# Patient Record
Sex: MALE | Race: OTHER | NOT HISPANIC OR LATINO | Employment: FULL TIME | URBAN - METROPOLITAN AREA
[De-identification: names, ages, dates, MRNs, and addresses within clinical notes are randomized per-mention and may not be internally consistent; named-entity substitution may affect disease eponyms.]

---

## 2018-07-20 ENCOUNTER — APPOINTMENT (OUTPATIENT)
Dept: RADIOLOGY | Facility: CLINIC | Age: 43
End: 2018-07-20
Payer: COMMERCIAL

## 2018-07-20 ENCOUNTER — TRANSCRIBE ORDERS (OUTPATIENT)
Dept: ADMINISTRATIVE | Facility: HOSPITAL | Age: 43
End: 2018-07-20

## 2018-07-20 DIAGNOSIS — M54.16 LUMBAR RADICULOPATHY: Primary | ICD-10-CM

## 2018-07-20 DIAGNOSIS — M54.16 LUMBAR RADICULOPATHY: ICD-10-CM

## 2018-07-20 PROCEDURE — 72110 X-RAY EXAM L-2 SPINE 4/>VWS: CPT

## 2020-06-16 ENCOUNTER — HOSPITAL ENCOUNTER (EMERGENCY)
Facility: HOSPITAL | Age: 45
Discharge: HOME | End: 2020-06-17
Attending: EMERGENCY MEDICINE | Admitting: EMERGENCY MEDICINE
Payer: COMMERCIAL

## 2020-06-16 DIAGNOSIS — R07.9 CHEST PAIN WITH LOW RISK FOR CARDIAC ETIOLOGY: Primary | ICD-10-CM

## 2020-06-16 PROCEDURE — 93005 ELECTROCARDIOGRAM TRACING: CPT

## 2020-06-16 PROCEDURE — 99285 EMERGENCY DEPT VISIT HI MDM: CPT

## 2020-06-17 ENCOUNTER — APPOINTMENT (EMERGENCY)
Dept: RADIOLOGY | Facility: HOSPITAL | Age: 45
End: 2020-06-17
Payer: COMMERCIAL

## 2020-06-17 VITALS
HEART RATE: 60 BPM | RESPIRATION RATE: 16 BRPM | OXYGEN SATURATION: 99 % | DIASTOLIC BLOOD PRESSURE: 94 MMHG | SYSTOLIC BLOOD PRESSURE: 149 MMHG

## 2020-06-17 LAB
ALBUMIN SERPL BCP-MCNC: 3.5 G/DL (ref 3.5–5)
ALP SERPL-CCNC: 128 U/L (ref 46–116)
ALT SERPL W P-5'-P-CCNC: 36 U/L (ref 12–78)
ANION GAP SERPL CALCULATED.3IONS-SCNC: 8 MMOL/L (ref 4–13)
AST SERPL W P-5'-P-CCNC: 18 U/L (ref 5–45)
ATRIAL RATE: 58 BPM
ATRIAL RATE: 71 BPM
BASOPHILS # BLD AUTO: 0.07 THOUSANDS/ΜL (ref 0–0.1)
BASOPHILS NFR BLD AUTO: 1 % (ref 0–1)
BILIRUB SERPL-MCNC: 0.2 MG/DL (ref 0.2–1)
BUN SERPL-MCNC: 14 MG/DL (ref 5–25)
CALCIUM SERPL-MCNC: 8.7 MG/DL (ref 8.3–10.1)
CHLORIDE SERPL-SCNC: 102 MMOL/L (ref 100–108)
CO2 SERPL-SCNC: 28 MMOL/L (ref 21–32)
CREAT SERPL-MCNC: 1.06 MG/DL (ref 0.6–1.3)
EOSINOPHIL # BLD AUTO: 0.32 THOUSAND/ΜL (ref 0–0.61)
EOSINOPHIL NFR BLD AUTO: 3 % (ref 0–6)
ERYTHROCYTE [DISTWIDTH] IN BLOOD BY AUTOMATED COUNT: 14 % (ref 11.6–15.1)
GFR SERPL CREATININE-BSD FRML MDRD: 84 ML/MIN/1.73SQ M
GLUCOSE SERPL-MCNC: 111 MG/DL (ref 65–140)
HCT VFR BLD AUTO: 41.4 % (ref 36.5–49.3)
HGB BLD-MCNC: 14.4 G/DL (ref 12–17)
IMM GRANULOCYTES # BLD AUTO: 0.02 THOUSAND/UL (ref 0–0.2)
IMM GRANULOCYTES NFR BLD AUTO: 0 % (ref 0–2)
LYMPHOCYTES # BLD AUTO: 3.51 THOUSANDS/ΜL (ref 0.6–4.47)
LYMPHOCYTES NFR BLD AUTO: 36 % (ref 14–44)
MCH RBC QN AUTO: 32.8 PG (ref 26.8–34.3)
MCHC RBC AUTO-ENTMCNC: 34.8 G/DL (ref 31.4–37.4)
MCV RBC AUTO: 94 FL (ref 82–98)
MONOCYTES # BLD AUTO: 0.91 THOUSAND/ΜL (ref 0.17–1.22)
MONOCYTES NFR BLD AUTO: 9 % (ref 4–12)
NEUTROPHILS # BLD AUTO: 4.86 THOUSANDS/ΜL (ref 1.85–7.62)
NEUTS SEG NFR BLD AUTO: 51 % (ref 43–75)
NRBC BLD AUTO-RTO: 0 /100 WBCS
P AXIS: 38 DEGREES
P AXIS: 44 DEGREES
PLATELET # BLD AUTO: 265 THOUSANDS/UL (ref 149–390)
PMV BLD AUTO: 10.3 FL (ref 8.9–12.7)
POTASSIUM SERPL-SCNC: 3.6 MMOL/L (ref 3.5–5.3)
PR INTERVAL: 150 MS
PR INTERVAL: 156 MS
PROT SERPL-MCNC: 6.6 G/DL (ref 6.4–8.2)
QRS AXIS: 61 DEGREES
QRS AXIS: 72 DEGREES
QRSD INTERVAL: 94 MS
QRSD INTERVAL: 96 MS
QT INTERVAL: 370 MS
QT INTERVAL: 420 MS
QTC INTERVAL: 402 MS
QTC INTERVAL: 412 MS
RBC # BLD AUTO: 4.39 MILLION/UL (ref 3.88–5.62)
SODIUM SERPL-SCNC: 138 MMOL/L (ref 136–145)
T WAVE AXIS: 10 DEGREES
T WAVE AXIS: 10 DEGREES
TROPONIN I SERPL-MCNC: <0.02 NG/ML
TROPONIN I SERPL-MCNC: <0.02 NG/ML
VENTRICULAR RATE: 58 BPM
VENTRICULAR RATE: 71 BPM
WBC # BLD AUTO: 9.69 THOUSAND/UL (ref 4.31–10.16)

## 2020-06-17 PROCEDURE — 85025 COMPLETE CBC W/AUTO DIFF WBC: CPT | Performed by: EMERGENCY MEDICINE

## 2020-06-17 PROCEDURE — 99285 EMERGENCY DEPT VISIT HI MDM: CPT | Performed by: EMERGENCY MEDICINE

## 2020-06-17 PROCEDURE — 36415 COLL VENOUS BLD VENIPUNCTURE: CPT | Performed by: EMERGENCY MEDICINE

## 2020-06-17 PROCEDURE — 80053 COMPREHEN METABOLIC PANEL: CPT | Performed by: EMERGENCY MEDICINE

## 2020-06-17 PROCEDURE — 93010 ELECTROCARDIOGRAM REPORT: CPT | Performed by: INTERNAL MEDICINE

## 2020-06-17 PROCEDURE — 93005 ELECTROCARDIOGRAM TRACING: CPT

## 2020-06-17 PROCEDURE — 84484 ASSAY OF TROPONIN QUANT: CPT | Performed by: EMERGENCY MEDICINE

## 2020-06-17 PROCEDURE — 96374 THER/PROPH/DIAG INJ IV PUSH: CPT

## 2020-06-17 PROCEDURE — 71046 X-RAY EXAM CHEST 2 VIEWS: CPT

## 2020-06-17 RX ORDER — KETOROLAC TROMETHAMINE 30 MG/ML
30 INJECTION, SOLUTION INTRAMUSCULAR; INTRAVENOUS ONCE
Status: COMPLETED | OUTPATIENT
Start: 2020-06-17 | End: 2020-06-17

## 2020-06-17 RX ORDER — MAGNESIUM HYDROXIDE/ALUMINUM HYDROXICE/SIMETHICONE 120; 1200; 1200 MG/30ML; MG/30ML; MG/30ML
30 SUSPENSION ORAL ONCE
Status: COMPLETED | OUTPATIENT
Start: 2020-06-17 | End: 2020-06-17

## 2020-06-17 RX ORDER — ASPIRIN 81 MG/1
243 TABLET, CHEWABLE ORAL ONCE
Status: COMPLETED | OUTPATIENT
Start: 2020-06-17 | End: 2020-06-17

## 2020-06-17 RX ADMIN — ASPIRIN 81 MG 243 MG: 81 TABLET ORAL at 00:30

## 2020-06-17 RX ADMIN — KETOROLAC TROMETHAMINE 30 MG: 30 INJECTION, SOLUTION INTRAMUSCULAR at 00:32

## 2020-06-17 RX ADMIN — ALUMINUM HYDROXIDE, MAGNESIUM HYDROXIDE, AND SIMETHICONE 30 ML: 200; 200; 20 SUSPENSION ORAL at 02:21

## 2020-12-01 ENCOUNTER — APPOINTMENT (EMERGENCY)
Dept: CT IMAGING | Facility: HOSPITAL | Age: 45
End: 2020-12-01
Payer: COMMERCIAL

## 2020-12-01 ENCOUNTER — HOSPITAL ENCOUNTER (EMERGENCY)
Facility: HOSPITAL | Age: 45
Discharge: HOME | End: 2020-12-02
Attending: EMERGENCY MEDICINE | Admitting: EMERGENCY MEDICINE
Payer: COMMERCIAL

## 2020-12-01 VITALS
HEART RATE: 72 BPM | BODY MASS INDEX: 30.06 KG/M2 | OXYGEN SATURATION: 98 % | WEIGHT: 210 LBS | HEIGHT: 70 IN | TEMPERATURE: 96.8 F | SYSTOLIC BLOOD PRESSURE: 189 MMHG | RESPIRATION RATE: 18 BRPM | DIASTOLIC BLOOD PRESSURE: 108 MMHG

## 2020-12-01 DIAGNOSIS — N20.1 URETEROLITHIASIS: Primary | ICD-10-CM

## 2020-12-01 LAB
ALBUMIN SERPL BCP-MCNC: 4 G/DL (ref 3.5–5.7)
ALP SERPL-CCNC: 95 U/L (ref 40–150)
ALT SERPL W P-5'-P-CCNC: 20 U/L (ref 7–52)
ANION GAP SERPL CALCULATED.3IONS-SCNC: 9 MMOL/L (ref 4–13)
APTT PPP: 24 SECONDS (ref 23–37)
AST SERPL W P-5'-P-CCNC: 14 U/L (ref 13–39)
BACTERIA UR QL AUTO: ABNORMAL /HPF
BASOPHILS # BLD AUTO: 0.1 THOUSANDS/ΜL (ref 0–0.1)
BASOPHILS NFR BLD AUTO: 1 % (ref 0–2)
BILIRUB SERPL-MCNC: 0.3 MG/DL (ref 0.2–1)
BILIRUB UR QL STRIP: NEGATIVE
BUN SERPL-MCNC: 18 MG/DL (ref 7–25)
CALCIUM SERPL-MCNC: 8.6 MG/DL (ref 8.6–10.5)
CHLORIDE SERPL-SCNC: 104 MMOL/L (ref 98–107)
CLARITY UR: CLEAR
CO2 SERPL-SCNC: 25 MMOL/L (ref 21–31)
COLOR UR: YELLOW
CREAT SERPL-MCNC: 1.34 MG/DL (ref 0.7–1.3)
EOSINOPHIL # BLD AUTO: 0.2 THOUSAND/ΜL (ref 0–0.61)
EOSINOPHIL NFR BLD AUTO: 2 % (ref 0–5)
ERYTHROCYTE [DISTWIDTH] IN BLOOD BY AUTOMATED COUNT: 14.2 % (ref 11.5–14.5)
GFR SERPL CREATININE-BSD FRML MDRD: 63 ML/MIN/1.73SQ M
GLUCOSE SERPL-MCNC: 131 MG/DL (ref 65–99)
GLUCOSE UR STRIP-MCNC: NEGATIVE MG/DL
HCT VFR BLD AUTO: 42.9 % (ref 42–47)
HGB BLD-MCNC: 14.8 G/DL (ref 14–18)
HGB UR QL STRIP.AUTO: ABNORMAL
INR PPP: 1.08 (ref 0.84–1.19)
KETONES UR STRIP-MCNC: ABNORMAL MG/DL
LEUKOCYTE ESTERASE UR QL STRIP: NEGATIVE
LIPASE SERPL-CCNC: 10 U/L (ref 11–82)
LYMPHOCYTES # BLD AUTO: 3.9 THOUSANDS/ΜL (ref 0.6–4.47)
LYMPHOCYTES NFR BLD AUTO: 28 % (ref 21–51)
MCH RBC QN AUTO: 32.5 PG (ref 26–34)
MCHC RBC AUTO-ENTMCNC: 34.5 G/DL (ref 31–37)
MCV RBC AUTO: 94 FL (ref 81–99)
MONOCYTES # BLD AUTO: 1 THOUSAND/ΜL (ref 0.17–1.22)
MONOCYTES NFR BLD AUTO: 7 % (ref 2–12)
NEUTROPHILS # BLD AUTO: 8.5 THOUSANDS/ΜL (ref 1.4–6.5)
NEUTS SEG NFR BLD AUTO: 62 % (ref 42–75)
NITRITE UR QL STRIP: NEGATIVE
NON-SQ EPI CELLS URNS QL MICRO: ABNORMAL /HPF
PH UR STRIP.AUTO: 7 [PH]
PLATELET # BLD AUTO: 273 THOUSANDS/UL (ref 149–390)
PMV BLD AUTO: 8.4 FL (ref 8.6–11.7)
POTASSIUM SERPL-SCNC: 3.6 MMOL/L (ref 3.5–5.5)
PROT SERPL-MCNC: 6.8 G/DL (ref 6.4–8.9)
PROT UR STRIP-MCNC: NEGATIVE MG/DL
PROTHROMBIN TIME: 13.9 SECONDS (ref 11.6–14.5)
RBC # BLD AUTO: 4.56 MILLION/UL (ref 4.3–5.9)
RBC #/AREA URNS AUTO: ABNORMAL /HPF
SODIUM SERPL-SCNC: 138 MMOL/L (ref 134–143)
SP GR UR STRIP.AUTO: 1.02 (ref 1–1.03)
UROBILINOGEN UR QL STRIP.AUTO: 0.2 E.U./DL
WBC # BLD AUTO: 13.7 THOUSAND/UL (ref 4.8–10.8)
WBC #/AREA URNS AUTO: ABNORMAL /HPF

## 2020-12-01 PROCEDURE — 96375 TX/PRO/DX INJ NEW DRUG ADDON: CPT

## 2020-12-01 PROCEDURE — 85610 PROTHROMBIN TIME: CPT | Performed by: EMERGENCY MEDICINE

## 2020-12-01 PROCEDURE — 81003 URINALYSIS AUTO W/O SCOPE: CPT | Performed by: EMERGENCY MEDICINE

## 2020-12-01 PROCEDURE — 93005 ELECTROCARDIOGRAM TRACING: CPT

## 2020-12-01 PROCEDURE — 81001 URINALYSIS AUTO W/SCOPE: CPT | Performed by: EMERGENCY MEDICINE

## 2020-12-01 PROCEDURE — 99285 EMERGENCY DEPT VISIT HI MDM: CPT

## 2020-12-01 PROCEDURE — 80053 COMPREHEN METABOLIC PANEL: CPT | Performed by: EMERGENCY MEDICINE

## 2020-12-01 PROCEDURE — 85730 THROMBOPLASTIN TIME PARTIAL: CPT | Performed by: EMERGENCY MEDICINE

## 2020-12-01 PROCEDURE — 96374 THER/PROPH/DIAG INJ IV PUSH: CPT

## 2020-12-01 PROCEDURE — 74177 CT ABD & PELVIS W/CONTRAST: CPT

## 2020-12-01 PROCEDURE — 83690 ASSAY OF LIPASE: CPT | Performed by: EMERGENCY MEDICINE

## 2020-12-01 PROCEDURE — 85025 COMPLETE CBC W/AUTO DIFF WBC: CPT | Performed by: EMERGENCY MEDICINE

## 2020-12-01 PROCEDURE — G1004 CDSM NDSC: HCPCS

## 2020-12-01 PROCEDURE — 36415 COLL VENOUS BLD VENIPUNCTURE: CPT | Performed by: EMERGENCY MEDICINE

## 2020-12-01 PROCEDURE — 99285 EMERGENCY DEPT VISIT HI MDM: CPT | Performed by: EMERGENCY MEDICINE

## 2020-12-01 RX ORDER — ONDANSETRON 2 MG/ML
4 INJECTION INTRAMUSCULAR; INTRAVENOUS ONCE AS NEEDED
Status: COMPLETED | OUTPATIENT
Start: 2020-12-01 | End: 2020-12-01

## 2020-12-01 RX ORDER — FENTANYL CITRATE 50 UG/ML
50 INJECTION, SOLUTION INTRAMUSCULAR; INTRAVENOUS ONCE
Status: COMPLETED | OUTPATIENT
Start: 2020-12-01 | End: 2020-12-01

## 2020-12-01 RX ORDER — HYDROMORPHONE HCL/PF 1 MG/ML
0.5 SYRINGE (ML) INJECTION ONCE
Status: DISCONTINUED | OUTPATIENT
Start: 2020-12-01 | End: 2020-12-01

## 2020-12-01 RX ORDER — ONDANSETRON 2 MG/ML
1 INJECTION INTRAMUSCULAR; INTRAVENOUS ONCE
Status: COMPLETED | OUTPATIENT
Start: 2020-12-01 | End: 2020-12-01

## 2020-12-01 RX ORDER — ACETAMINOPHEN 325 MG/1
650 TABLET ORAL ONCE
Status: COMPLETED | OUTPATIENT
Start: 2020-12-01 | End: 2020-12-02

## 2020-12-01 RX ORDER — FENTANYL CITRATE 50 UG/ML
1 INJECTION, SOLUTION INTRAMUSCULAR; INTRAVENOUS ONCE
Status: COMPLETED | OUTPATIENT
Start: 2020-12-01 | End: 2020-12-01

## 2020-12-01 RX ORDER — KETOROLAC TROMETHAMINE 30 MG/ML
15 INJECTION, SOLUTION INTRAMUSCULAR; INTRAVENOUS ONCE
Status: COMPLETED | OUTPATIENT
Start: 2020-12-01 | End: 2020-12-01

## 2020-12-01 RX ADMIN — FENTANYL CITRATE 50 MCG: 50 INJECTION INTRAMUSCULAR; INTRAVENOUS at 21:26

## 2020-12-01 RX ADMIN — KETOROLAC TROMETHAMINE 15 MG: 30 INJECTION, SOLUTION INTRAMUSCULAR; INTRAVENOUS at 23:13

## 2020-12-01 RX ADMIN — ONDANSETRON 4 MG: 2 INJECTION INTRAMUSCULAR; INTRAVENOUS at 21:43

## 2020-12-01 RX ADMIN — IOHEXOL 100 ML: 350 INJECTION, SOLUTION INTRAVENOUS at 22:30

## 2020-12-02 LAB
ATRIAL RATE: 73 BPM
P AXIS: 56 DEGREES
PR INTERVAL: 146 MS
QRS AXIS: 61 DEGREES
QRSD INTERVAL: 90 MS
QT INTERVAL: 406 MS
QTC INTERVAL: 447 MS
T WAVE AXIS: 55 DEGREES
VENTRICULAR RATE: 73 BPM

## 2020-12-02 PROCEDURE — 93010 ELECTROCARDIOGRAM REPORT: CPT | Performed by: INTERNAL MEDICINE

## 2020-12-02 RX ORDER — MORPHINE SULFATE 15 MG/1
15 TABLET ORAL EVERY 8 HOURS PRN
Qty: 12 TABLET | Refills: 0 | Status: SHIPPED | OUTPATIENT
Start: 2020-12-02 | End: 2020-12-12

## 2020-12-02 RX ORDER — TAMSULOSIN HYDROCHLORIDE 0.4 MG/1
0.4 CAPSULE ORAL
Qty: 10 CAPSULE | Refills: 0 | Status: SHIPPED | OUTPATIENT
Start: 2020-12-02 | End: 2022-02-15 | Stop reason: HOSPADM

## 2020-12-02 RX ORDER — IBUPROFEN 400 MG/1
400 TABLET ORAL EVERY 6 HOURS PRN
Qty: 30 TABLET | Refills: 0 | Status: SHIPPED | OUTPATIENT
Start: 2020-12-02 | End: 2022-02-15 | Stop reason: HOSPADM

## 2020-12-02 RX ORDER — ACETAMINOPHEN 325 MG/1
650 TABLET ORAL EVERY 6 HOURS PRN
Qty: 30 TABLET | Refills: 0 | Status: SHIPPED | OUTPATIENT
Start: 2020-12-02 | End: 2020-12-07

## 2020-12-02 RX ORDER — ONDANSETRON 4 MG/1
4 TABLET, ORALLY DISINTEGRATING ORAL EVERY 6 HOURS PRN
Qty: 16 TABLET | Refills: 0 | Status: SHIPPED | OUTPATIENT
Start: 2020-12-02 | End: 2022-02-15 | Stop reason: HOSPADM

## 2020-12-02 RX ADMIN — ACETAMINOPHEN 650 MG: 325 TABLET ORAL at 00:26

## 2021-03-17 ENCOUNTER — HOSPITAL ENCOUNTER (EMERGENCY)
Facility: HOSPITAL | Age: 46
Discharge: HOME | End: 2021-03-17
Attending: EMERGENCY MEDICINE
Payer: COMMERCIAL

## 2021-03-17 ENCOUNTER — APPOINTMENT (EMERGENCY)
Dept: RADIOLOGY | Facility: HOSPITAL | Age: 46
End: 2021-03-17
Payer: COMMERCIAL

## 2021-03-17 VITALS
OXYGEN SATURATION: 98 % | RESPIRATION RATE: 18 BRPM | TEMPERATURE: 98.4 F | DIASTOLIC BLOOD PRESSURE: 92 MMHG | SYSTOLIC BLOOD PRESSURE: 154 MMHG | BODY MASS INDEX: 30.49 KG/M2 | HEART RATE: 91 BPM | WEIGHT: 212.52 LBS

## 2021-03-17 DIAGNOSIS — R06.00 DYSPNEA: Primary | ICD-10-CM

## 2021-03-17 DIAGNOSIS — U07.1 COVID-19: ICD-10-CM

## 2021-03-17 LAB
ALBUMIN SERPL BCP-MCNC: 3.6 G/DL (ref 3.5–5)
ALP SERPL-CCNC: 139 U/L (ref 46–116)
ALT SERPL W P-5'-P-CCNC: 52 U/L (ref 12–78)
ANION GAP SERPL CALCULATED.3IONS-SCNC: 10 MMOL/L (ref 4–13)
AST SERPL W P-5'-P-CCNC: 30 U/L (ref 5–45)
ATRIAL RATE: 77 BPM
BASE EX.OXY STD BLDV CALC-SCNC: 87.4 % (ref 60–80)
BASE EXCESS BLDV CALC-SCNC: -0.6 MMOL/L
BASOPHILS # BLD AUTO: 0.05 THOUSANDS/ΜL (ref 0–0.1)
BASOPHILS NFR BLD AUTO: 1 % (ref 0–1)
BILIRUB DIRECT SERPL-MCNC: 0.07 MG/DL (ref 0–0.2)
BILIRUB SERPL-MCNC: 0.15 MG/DL (ref 0.2–1)
BUN SERPL-MCNC: 12 MG/DL (ref 5–25)
CALCIUM SERPL-MCNC: 9 MG/DL (ref 8.3–10.1)
CHLORIDE SERPL-SCNC: 103 MMOL/L (ref 100–108)
CO2 SERPL-SCNC: 24 MMOL/L (ref 21–32)
CREAT SERPL-MCNC: 1.25 MG/DL (ref 0.6–1.3)
EOSINOPHIL # BLD AUTO: 0.06 THOUSAND/ΜL (ref 0–0.61)
EOSINOPHIL NFR BLD AUTO: 1 % (ref 0–6)
ERYTHROCYTE [DISTWIDTH] IN BLOOD BY AUTOMATED COUNT: 14.3 % (ref 11.6–15.1)
GFR SERPL CREATININE-BSD FRML MDRD: 68 ML/MIN/1.73SQ M
GLUCOSE SERPL-MCNC: 111 MG/DL (ref 65–140)
HCO3 BLDV-SCNC: 23 MMOL/L (ref 24–30)
HCT VFR BLD AUTO: 45.3 % (ref 36.5–49.3)
HGB BLD-MCNC: 15.7 G/DL (ref 12–17)
IMM GRANULOCYTES # BLD AUTO: 0.02 THOUSAND/UL (ref 0–0.2)
IMM GRANULOCYTES NFR BLD AUTO: 0 % (ref 0–2)
LYMPHOCYTES # BLD AUTO: 1.5 THOUSANDS/ΜL (ref 0.6–4.47)
LYMPHOCYTES NFR BLD AUTO: 21 % (ref 14–44)
MCH RBC QN AUTO: 32 PG (ref 26.8–34.3)
MCHC RBC AUTO-ENTMCNC: 34.7 G/DL (ref 31.4–37.4)
MCV RBC AUTO: 92 FL (ref 82–98)
MONOCYTES # BLD AUTO: 1.03 THOUSAND/ΜL (ref 0.17–1.22)
MONOCYTES NFR BLD AUTO: 15 % (ref 4–12)
NEUTROPHILS # BLD AUTO: 4.36 THOUSANDS/ΜL (ref 1.85–7.62)
NEUTS SEG NFR BLD AUTO: 62 % (ref 43–75)
NRBC BLD AUTO-RTO: 0 /100 WBCS
O2 CT BLDV-SCNC: 19.9 ML/DL
P AXIS: 49 DEGREES
PCO2 BLDV: 35.2 MM HG (ref 42–50)
PH BLDV: 7.43 [PH] (ref 7.3–7.4)
PLATELET # BLD AUTO: 242 THOUSANDS/UL (ref 149–390)
PMV BLD AUTO: 10.1 FL (ref 8.9–12.7)
PO2 BLDV: 58.8 MM HG (ref 35–45)
POTASSIUM SERPL-SCNC: 3.9 MMOL/L (ref 3.5–5.3)
PR INTERVAL: 134 MS
PROT SERPL-MCNC: 7.3 G/DL (ref 6.4–8.2)
QRS AXIS: 62 DEGREES
QRSD INTERVAL: 92 MS
QT INTERVAL: 346 MS
QTC INTERVAL: 391 MS
RBC # BLD AUTO: 4.9 MILLION/UL (ref 3.88–5.62)
SODIUM SERPL-SCNC: 137 MMOL/L (ref 136–145)
T WAVE AXIS: 22 DEGREES
VENTRICULAR RATE: 77 BPM
WBC # BLD AUTO: 7.02 THOUSAND/UL (ref 4.31–10.16)

## 2021-03-17 PROCEDURE — 80076 HEPATIC FUNCTION PANEL: CPT | Performed by: EMERGENCY MEDICINE

## 2021-03-17 PROCEDURE — 93005 ELECTROCARDIOGRAM TRACING: CPT

## 2021-03-17 PROCEDURE — 71045 X-RAY EXAM CHEST 1 VIEW: CPT

## 2021-03-17 PROCEDURE — 93010 ELECTROCARDIOGRAM REPORT: CPT

## 2021-03-17 PROCEDURE — 80048 BASIC METABOLIC PNL TOTAL CA: CPT | Performed by: EMERGENCY MEDICINE

## 2021-03-17 PROCEDURE — 36415 COLL VENOUS BLD VENIPUNCTURE: CPT | Performed by: EMERGENCY MEDICINE

## 2021-03-17 PROCEDURE — 99285 EMERGENCY DEPT VISIT HI MDM: CPT | Performed by: EMERGENCY MEDICINE

## 2021-03-17 PROCEDURE — 85025 COMPLETE CBC W/AUTO DIFF WBC: CPT | Performed by: EMERGENCY MEDICINE

## 2021-03-17 PROCEDURE — 82805 BLOOD GASES W/O2 SATURATION: CPT | Performed by: EMERGENCY MEDICINE

## 2021-03-17 PROCEDURE — 99285 EMERGENCY DEPT VISIT HI MDM: CPT

## 2021-03-17 RX ORDER — ALBUTEROL SULFATE 90 UG/1
2 AEROSOL, METERED RESPIRATORY (INHALATION) ONCE
Status: COMPLETED | OUTPATIENT
Start: 2021-03-17 | End: 2021-03-17

## 2021-03-17 RX ADMIN — ALBUTEROL SULFATE 2 PUFF: 90 AEROSOL, METERED RESPIRATORY (INHALATION) at 21:14

## 2021-03-18 NOTE — ED PROVIDER NOTES
History  Chief Complaint   Patient presents with    Shortness of Breath     Pt states "sob not sure if its from covid or sinus infection"     52 YO male, known COVID +, presents with 3 hours of SOB  Pt states he has had URI symptoms for the last 2 days, he was tested as an outpatient yesterday and this came back positive  He has had nasal congestion, aches  Tonight pt felt as if he was not able to get in enough oxygen  He denies chest pain  Pt is a smoker, he has COPD which he does not require treatment for  Pt denies CP/F/C/N/V/D/C, no dysuria, burning on urination or blood in urine  History provided by:  Patient   used: No    Shortness of Breath  Severity:  Moderate  Onset quality:  Gradual  Duration:  3 hours  Timing:  Constant  Progression:  Unchanged  Chronicity:  New  Context: URI    Relieved by:  Nothing  Worsened by:  Exertion  Ineffective treatments:  None tried  Associated symptoms: no abdominal pain, no chest pain, no fever, no neck pain, no rash and no vomiting        Prior to Admission Medications   Prescriptions Last Dose Informant Patient Reported? Taking?   ibuprofen (MOTRIN) 400 mg tablet   No No   Sig: Take 1 tablet (400 mg total) by mouth every 6 (six) hours as needed for mild pain for up to 5 days   ondansetron (ZOFRAN-ODT) 4 mg disintegrating tablet   No No   Sig: Take 1 tablet (4 mg total) by mouth every 6 (six) hours as needed for nausea or vomiting   tamsulosin (FLOMAX) 0 4 mg   No No   Sig: Take 1 capsule (0 4 mg total) by mouth daily with dinner      Facility-Administered Medications: None       Past Medical History:   Diagnosis Date    [facility]     [facility]     [facility] (my[facility] infarction) ([facility] ) 2007       History reviewed  No pertinent surgical history  History reviewed  No pertinent family history  I have reviewed and agree with the history as documented      E-Cigarette/Vaping E-Cigarette/Vaping Substances     Social History     Tobacco Use    Smoking status: Current Every Day Smoker     Packs/day: 0 50     Years: 24 00     Pack years: 12 00     Types: Cigarettes    Smokeless tobacco: Current User    Tobacco comment: "here and there a cigerette"   Substance Use Topics    Alcohol use: Not Currently    Drug use: Not Currently       Review of Systems   Constitutional: Negative for fever  HENT: Negative for dental problem  Eyes: Negative for visual disturbance  Respiratory: Positive for shortness of breath  Cardiovascular: Negative for chest pain  Gastrointestinal: Negative for abdominal pain, nausea and vomiting  Genitourinary: Negative for dysuria and frequency  Musculoskeletal: Negative for neck pain and neck stiffness  Skin: Negative for rash  Neurological: Negative for dizziness, weakness and light-headedness  Psychiatric/Behavioral: Negative for agitation, behavioral problems and confusion  All other systems reviewed and are negative  Physical Exam  Physical Exam  Vitals signs and nursing note reviewed  Constitutional:       Appearance: He is well-developed  HENT:      Head: Normocephalic and atraumatic  Eyes:      Extraocular Movements: Extraocular movements intact  Neck:      Musculoskeletal: Normal range of motion  Cardiovascular:      Rate and Rhythm: Normal rate and regular rhythm  Pulmonary:      Effort: Pulmonary effort is normal    Abdominal:      General: There is no distension  Musculoskeletal: Normal range of motion  Skin:     Findings: No rash  Neurological:      Mental Status: He is alert and oriented to person, place, and time     Psychiatric:         Behavior: Behavior normal          Vital Signs  ED Triage Vitals [03/17/21 2045]   Temperature Pulse Respirations Blood Pressure SpO2   98 4 °F (36 9 °C) 91 18 154/92 98 %      Temp src Heart Rate Source Patient Position - Orthostatic VS BP Location FiO2 (%)   -- -- -- -- --      Pain Score       6           Vitals:    03/17/21 2045   BP: 154/92   Pulse: 91         Visual Acuity      ED Medications  Medications   albuterol (PROVENTIL HFA,VENTOLIN HFA) inhaler 2 puff (2 puffs Inhalation Given 3/17/21 2114)       Diagnostic Studies  Results Reviewed     Procedure Component Value Units Date/Time    Basic metabolic panel [[id number]] Collected: 03/17/21 2114    Lab Status: Final result Specimen: Blood from Arm, Left Updated: 03/17/21 2147     Sodium 137 mmol/L      Potassium 3 9 mmol/L      Chloride 103 mmol/L      CO2 24 mmol/L      ANION GAP 10 mmol/L      BUN 12 mg/dL      Creatinine 1 25 mg/dL      Glucose 111 mg/dL      Calcium 9 0 mg/dL      eGFR 68 ml/min/1 73sq m     Narrative:      [place] guidelines for Chronic Kidney Disease (CKD):     Stage 1 with normal or high GFR (GFR > 90 mL/min/1 73 square meters)    Stage 2 Mild CKD (GFR = 60-89 mL/min/1 73 square meters)    Stage 3A Moderate CKD (GFR = 45-59 mL/min/1 73 square meters)    Stage 3B Moderate CKD (GFR = 30-44 mL/min/1 73 square meters)    Stage 4 Severe CKD (GFR = 15-29 mL/min/1 73 square meters)    Stage 5 End Stage CKD (GFR <15 mL/min/1 73 square meters)  Note: GFR calculation is accurate only with a steady state creatinine    Hepatic function panel [[id number]]  (Abnormal) Collected: 03/17/21 2114    Lab Status: Final result Specimen: Blood from Arm, Left Updated: 03/17/21 2147     Total Bilirubin 0 15 mg/dL      Bilirubin, Direct 0 07 mg/dL      Alkaline Phosphatase 139 U/L      AST 30 U/L      ALT 52 U/L      Total Protein 7 3 g/dL      Albumin 3 6 g/dL     Blood gas, venous [[id number]]  (Abnormal) Collected: 03/17/21 2114    Lab Status: Final result Specimen: Blood from Arm, Left Updated: 03/17/21 2129     pH, Henry 7 433     pCO2, Henry 35 2 mm Hg      pO2, Henry 58 8 mm Hg      HCO3, Henry 23 0 mmol/L      Base Excess, Henry -0 6 mmol/L      O2 Content, Henry 19 9 ml/dL      O2 HGB, VENOUS 87 4 % CBC and differential [[id number]]  (Abnormal) Collected: 03/17/21 2114    Lab Status: Final result Specimen: Blood from Arm, Left Updated: 03/17/21 2128     WBC 7 02 Thousand/uL      RBC 4 90 Million/uL      Hemoglobin 15 7 g/dL      Hematocrit 45 3 %      MCV 92 fL      MCH 32 0 pg      MCHC 34 7 g/dL      RDW 14 3 %      MPV 10 1 fL      Platelets 905 Thousands/uL      nRBC 0 /100 WBCs      Neutrophils Relative 62 %      Immat GRANS % 0 %      Lymphocytes Relative 21 %      Monocytes Relative 15 %      Eosinophils Relative 1 %      Basophils Relative 1 %      Neutrophils Absolute 4 36 Thousands/µL      Immature Grans Absolute 0 02 Thousand/uL      Lymphocytes Absolute 1 50 Thousands/µL      Monocytes Absolute 1 03 Thousand/µL      Eosinophils Absolute 0 06 Thousand/µL      Basophils Absolute 0 05 Thousands/µL                  XR chest 1 view portable    (Results Pending)              Procedures  ECG 12 Lead Documentation Only    Date/Time: 3/17/2021 9:22 PM  Performed by: Maryuri Baptiste MD  Authorized by: Maryuri Baptiste MD     ECG reviewed by me, the ED Provider: yes    Patient location:  ED  Interpretation:     Interpretation: normal    Rate:     ECG rate:  77    ECG rate assessment: normal    Rhythm:     Rhythm: sinus rhythm    QRS:     QRS axis:  Normal    QRS intervals:  Normal  Conduction:     Conduction: normal    ST segments:     ST segments:  Normal  T waves:     T waves: normal               ED Course                                           MDM  Number of Diagnoses or Management Options  COVID-19: new and requires workup  Dyspnea: new and requires workup  Diagnosis management comments: 1  SOB - Pt with Hx of COPD, known COVID +, Pt's oxygen saturation is 98% on room air, pt is not tachypneic with no obvious increased work of breathing  Will check CXR, CBC for anemia, VBG  Will give albuterol and reassess         Amount and/or Complexity of Data Reviewed  Clinical lab tests: ordered and reviewed  Tests in the radiology section of CPT®: reviewed and ordered  Review and summarize past medical records: yes  Independent visualization of images, tracings, or specimens: yes    Patient Progress  Patient progress: stable      Disposition  Final diagnoses:   Dyspnea   COVID-19     Time reflects when diagnosis was documented in both MDM as applicable and the Disposition within this note     Time User Action Codes Description Comment    3/17/2021 10:05 PM Sara LAW Add [R06 00] Dyspnea     3/17/2021 10:05 PM Gena Morales, [address] [U07 1] COVID-19       ED Disposition     ED Disposition Condition Date/Time Comment    Discharge Stable Wed Mar 17, 2021 10:05 PM [address] [place] [address] discharge to home/self care  Follow-up Information    None         Patient's Medications   Discharge Prescriptions    No medications on file     No discharge procedures on file      PDMP Review     None          ED Provider  Electronically Signed by           Sharri Doyle MD  03/17/21 1029

## 2021-03-18 NOTE — DISCHARGE INSTRUCTIONS
If your breathing worsens, return to be rechecked to make sure your oxygen is still appropriate      Medications that may help reduce severity of infection, these can be purchased over the counter:    -Vitamin D3 2000 IU (international units) Daily    -Vitamin C 1g every 12 hours    -Multivitamin Daily

## 2021-05-23 ENCOUNTER — HOSPITAL ENCOUNTER (EMERGENCY)
Facility: HOSPITAL | Age: 46
Discharge: HOME | End: 2021-05-23
Attending: EMERGENCY MEDICINE | Admitting: EMERGENCY MEDICINE
Payer: OTHER MISCELLANEOUS

## 2021-05-23 ENCOUNTER — APPOINTMENT (EMERGENCY)
Dept: RADIOLOGY | Facility: HOSPITAL | Age: 46
End: 2021-05-23
Payer: OTHER MISCELLANEOUS

## 2021-05-23 VITALS
RESPIRATION RATE: 16 BRPM | SYSTOLIC BLOOD PRESSURE: 152 MMHG | HEART RATE: 90 BPM | TEMPERATURE: 97.4 F | WEIGHT: 212 LBS | BODY MASS INDEX: 30.42 KG/M2 | DIASTOLIC BLOOD PRESSURE: 95 MMHG | OXYGEN SATURATION: 97 %

## 2021-05-23 DIAGNOSIS — S83.91XA RIGHT KNEE SPRAIN: Primary | ICD-10-CM

## 2021-05-23 PROCEDURE — 73564 X-RAY EXAM KNEE 4 OR MORE: CPT

## 2021-05-23 PROCEDURE — 99283 EMERGENCY DEPT VISIT LOW MDM: CPT

## 2021-05-23 PROCEDURE — 99284 EMERGENCY DEPT VISIT MOD MDM: CPT | Performed by: EMERGENCY MEDICINE

## 2021-05-24 NOTE — ED PROVIDER NOTES
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 Taking?   ibuprofen (MOTRIN) 400 mg tablet   No No   Sig: Take 1 tablet (400 mg total) by mouth every 6 (six) hours as needed for mild pain for up to 5 days   ondansetron (ZOFRAN-ODT) 4 mg disintegrating tablet   No No   Sig: Take 1 tablet (4 mg total) by mouth every 6 (six) hours as needed for nausea or vomiting   tamsulosin (FLOMAX) 0 4 mg   No No   Sig: Take 1 capsule (0 4 mg total) by mouth daily with dinner      Facility-Administered Medications: None       Past Medical History:   Diagnosis Date    [facility]     [facility]     [facility]my[facility] infarction) ([facility] ) 2007       History reviewed  No pertinent surgical history  History reviewed  No pertinent family history  I have reviewed and agree with the history as documented  E-Cigarette/Vaping     E-Cigarette/Vaping Substances     Social History     Tobacco Use    Smoking status: Current Every Day Smoker     Packs/day: 0 50     Years: 24 00     Pack years: 12 00     Types: Cigarettes    Smokeless tobacco: Current User    Tobacco comment: "here and there a cigerette"   Substance Use Topics    Alcohol use: Not Currently    Drug use: Not Currently       Review of Systems   Constitutional: Negative  HENT: Negative      Eyes: Negative  Respiratory: Negative  Cardiovascular: Negative  Gastrointestinal: Negative  Endocrine: Negative  Genitourinary: Negative  Musculoskeletal: Positive for arthralgias  Skin: Positive for wound  Allergic/Immunologic: Negative  Neurological: Negative  Hematological: Negative  Psychiatric/Behavioral: Negative  All other systems reviewed and are negative  Physical Exam  Physical Exam  Vitals signs and nursing note reviewed  Constitutional:       Appearance: Normal appearance  He is normal weight  HENT:      Head: Normocephalic and atraumatic  Neck:      Musculoskeletal: Normal range of motion and neck supple  Cardiovascular:      Rate and Rhythm: Normal rate and regular rhythm  Pulses: Normal pulses  Heart sounds: Normal heart sounds  Pulmonary:      Effort: Pulmonary effort is normal    Musculoskeletal: Normal range of motion  General: Tenderness present  Comments: Tenderness over the patellar tendon  Full range of motion  No knee or ankle pain  Negative varus and valgus   Skin:     General: Skin is warm  Comments: Patient with abrasions on the inferior aspect of the patella   Neurological:      General: No focal deficit present  Mental Status: He is alert and oriented to person, place, and time  Sensory: No sensory deficit     Psychiatric:         Mood and Affect: Mood normal          Behavior: Behavior normal          Vital Signs  ED Triage Vitals [05/23/21 2106]   Temperature Pulse Respirations Blood Pressure SpO2   (!) 97 4 °F (36 3 °C) 90 16 152/95 97 %      Temp Source Heart Rate Source Patient Position - Orthostatic VS BP Location FiO2 (%)   Tympanic Monitor Sitting Left arm --      Pain Score       5           Vitals:    05/23/21 2106   BP: 152/95   Pulse: 90   Patient Position - Orthostatic VS: Sitting         Visual Acuity      ED Medications  Medications - No data to display    Diagnostic Studies  Results Reviewed None                 XR knee 4+ vw right injury   ED Interpretation by Deedee Lutz MD (05/23 2105)   No fx  No dislocation  Procedures  Procedures         ED Course                             SBIRT 22yo+      Most Recent Value   SBIRT (22 yo +)   In order to provide better care to our patients, we are screening all of our patients for alcohol and drug use  Would it be okay to ask you these screening questions? Yes Filed at: 05/23/2021 2056   Initial Alcohol Screen: US AUDIT-C    1  How often do you have a drink containing alcohol?  0 Filed at: 05/23/2021 2056   2  How many drinks containing alcohol do you have on a typical day you are drinking? 0 Filed at: 05/23/2021 2056   3a  Male UNDER 65: How often do you have five or more drinks on one occasion? 0 Filed at: 05/23/2021 2056   3b  FEMALE Any Age, or MALE 65+: How often do you have 4 or more drinks on one occassion? 0 Filed at: 05/23/2021 2056   Audit-C Score  0 Filed at: 05/23/2021 2056   JOVAN: How many times in the past year have you    Used an illegal drug or used a prescription medication for non-medical reasons? Never Filed at: 05/23/2021 2056                    MDM  Number of Diagnoses or Management Options  Right knee sprain:      Amount and/or Complexity of Data Reviewed  Tests in the radiology section of CPT®: ordered and reviewed  Independent visualization of images, tracings, or specimens: yes        Disposition  Final diagnoses:   Right knee sprain     Time reflects when diagnosis was documented in both MDM as applicable and the Disposition within this note     Time User Action Codes Description Comment    5/23/2021  9:07 PM William Whittaker Right knee sprain       ED Disposition     ED Disposition Condition Date/Time Comment    Discharge Stable Sun May 23, 2021  9:07 PM [facility] discharge to home/self care              Follow-up Information     Follow up With Specialties Details Why Contact Info Follow up with your worker's comp doctor  Discharge Medication List as of 5/23/2021  9:08 PM      CONTINUE these medications which have NOT CHANGED    Details   ibuprofen (MOTRIN) 400 mg tablet Take 1 tablet (400 mg total) by mouth every 6 (six) hours as needed for mild pain for up to 5 days, Starting Wed 12/2/2020, Until Mon 12/7/2020, Print      ondansetron (ZOFRAN-ODT) 4 mg disintegrating tablet Take 1 tablet (4 mg total) by mouth every 6 (six) hours as needed for nausea or vomiting, Starting Wed 12/2/2020, Print      tamsulosin (FLOMAX) 0 4 mg Take 1 capsule (0 4 mg total) by mouth daily with dinner, Starting Wed 12/2/2020, Print           No discharge procedures on file      PDMP Review     None          ED Provider  Electronically Signed by           Kaila Munroe MD  05/23/21 0153

## 2021-06-21 ENCOUNTER — APPOINTMENT (EMERGENCY)
Dept: RADIOLOGY | Facility: HOSPITAL | Age: 46
End: 2021-06-21
Payer: OTHER MISCELLANEOUS

## 2021-06-21 ENCOUNTER — HOSPITAL ENCOUNTER (EMERGENCY)
Facility: HOSPITAL | Age: 46
Discharge: HOME | End: 2021-06-21
Attending: EMERGENCY MEDICINE
Payer: OTHER MISCELLANEOUS

## 2021-06-21 VITALS
TEMPERATURE: 97.9 F | SYSTOLIC BLOOD PRESSURE: 147 MMHG | BODY MASS INDEX: 30.21 KG/M2 | HEART RATE: 83 BPM | OXYGEN SATURATION: 96 % | DIASTOLIC BLOOD PRESSURE: 92 MMHG | HEIGHT: 70 IN | WEIGHT: 211 LBS | RESPIRATION RATE: 18 BRPM

## 2021-06-21 DIAGNOSIS — S62.525A CLOSED NONDISPLACED FRACTURE OF DISTAL PHALANX OF LEFT THUMB, INITIAL ENCOUNTER: Primary | ICD-10-CM

## 2021-06-21 PROCEDURE — 73130 X-RAY EXAM OF HAND: CPT

## 2021-06-21 PROCEDURE — 99283 EMERGENCY DEPT VISIT LOW MDM: CPT

## 2021-06-21 PROCEDURE — 99284 EMERGENCY DEPT VISIT MOD MDM: CPT | Performed by: EMERGENCY MEDICINE

## 2021-06-21 NOTE — Clinical Note
Siddhartha Pérez was seen and treated in our emergency department on 6/21/2021  Diagnosis:     RAFAEL    He may return on this date:     No heavy duty or strenuous activity until cleared by orthopedics     If you have any questions or concerns, please don't hesitate to call        Johnny Cook PA-C    ______________________________           _______________          _______________  Hospital Representative                              Date                                Time

## 2021-06-21 NOTE — ED PROVIDER NOTES
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  Patient states pain is about a five and nail is purple in color  Pt is a 51year old male presenting with left thumb injury  Pt states he accidentally closed the door on his left thumb PTA  He now has distal thumb pain with ecchymosis of the nail  Neurovascularly in tact distally  No prior injuries to the thumb  History provided by:  Patient   used: No    Hand Pain  Location:  Left thumb  Quality:  Sore  Severity:  Moderate  Onset quality:  Sudden  Timing:  Constant  Progression:  Unchanged  Chronicity:  New  Context:  Closed thumb in door  Relieved by:  Nothing   Worsened by: Movement, palpation  Ineffective treatments:  None tried       Prior to Admission Medications   Prescriptions Last Dose Informant Patient Reported? Taking?   ibuprofen (MOTRIN) 400 mg tablet   No No   Sig: Take 1 tablet (400 mg total) by mouth every 6 (six) hours as needed for mild pain for up to 5 days   ondansetron (ZOFRAN-ODT) 4 mg disintegrating tablet Not Taking at Unknown time  No No   Sig: Take 1 tablet (4 mg total) by mouth every 6 (six) hours as needed for nausea or vomiting   Patient not taking: Reported on 6/21/2021   tamsulosin (FLOMAX) 0 4 mg Not Taking at Unknown time  No No   Sig: Take 1 capsule (0 4 mg total) by mouth daily with dinner   Patient not taking: Reported on 6/21/2021      Facility-Administered Medications: None       Past Medical History:   Diagnosis Date    [facility] )     [facility]     MI (myocardial infarction) ([facility] ) 2007       History reviewed  No pertinent surgical history  History reviewed  No pertinent family history  I have reviewed and agree with the history as documented      E-Cigarette/Vaping     E-Cigarette/Vaping Substances     Social History     Tobacco Use    Smoking status: Current Every Day Smoker     Packs/day: 0 50     Years: 24 00     Pack years: 12 00     Types: Cigarettes    Smokeless tobacco: Current User    Tobacco comment: "here and there a cigerette"   Substance Use Topics    Alcohol use: Not Currently    Drug use: Not Currently       Review of Systems   Constitutional: Negative  HENT: Negative  Respiratory: Negative  Cardiovascular: Negative  Gastrointestinal: Negative  Genitourinary: Negative  Musculoskeletal: Positive for arthralgias  Negative for joint swelling  Skin: Positive for color change  Neurological: Negative  All other systems reviewed and are negative  Physical Exam  Physical Exam  Constitutional:       General: He is not in acute distress  Appearance: He is well-developed  He is not diaphoretic  HENT:      Head: Normocephalic and atraumatic  Right Ear: External ear normal       Left Ear: External ear normal       Nose: Nose normal    Eyes:      General: No scleral icterus  Right eye: No discharge  Left eye: No discharge  Extraocular Movements: Extraocular movements intact  Conjunctiva/sclera: Conjunctivae normal    Cardiovascular:      Rate and Rhythm: Normal rate and regular rhythm  Pulses:           Radial pulses are 2+ on the left side  Heart sounds: Normal heart sounds  Pulmonary:      Effort: Pulmonary effort is normal       Breath sounds: Normal breath sounds  Musculoskeletal:      Left wrist: Normal       Right hand: Normal       Left hand: Tenderness and bony tenderness present  No swelling, deformity or lacerations  Decreased range of motion  Decreased strength  Normal sensation  Normal capillary refill  Normal pulse  Hands:       Cervical back: Normal range of motion and neck supple  Comments: Ecchymosis noted of the left nail without subungual hematoma to drain  TTP distal left thumb  Neurovascularly in tact  Able to flex and extend against resistance      Skin: General: Skin is warm and dry  Neurological:      Mental Status: He is alert and oriented to person, place, and time  Psychiatric:         Mood and Affect: Mood normal          Behavior: Behavior normal          Vital Signs  ED Triage Vitals   Temperature Pulse Respirations Blood Pressure SpO2   06/21/21 1918 06/21/21 1915 06/21/21 1915 06/21/21 1915 06/21/21 1915   97 9 °F (36 6 °C) 87 18 134/95 96 %      Temp Source Heart Rate Source Patient Position - Orthostatic VS BP Location FiO2 (%)   06/21/21 1918 06/21/21 1915 06/21/21 1915 06/21/21 1915 --   Oral Monitor Sitting Right arm       Pain Score       06/21/21 1915       5           Vitals:    06/21/21 1915 06/21/21 2007   BP: 134/95 147/92   Pulse: 87 83   Patient Position - Orthostatic VS: Sitting Sitting         Visual Acuity      ED Medications  Medications - No data to display    Diagnostic Studies  Results Reviewed     None                 XR hand 3+ views LEFT   ED Interpretation by Jackquline Lennox, PA-C (06/21 2013)   Abnormal   Avulsion fracture distal thumb                 Procedures  Procedures         ED Course                             SBIRT 20yo+      Most Recent Value   SBIRT (25 yo +)   In order to provide better care to our patients, we are screening all of our patients for alcohol and drug use  Would it be okay to ask you these screening questions? Unable to answer at this time Filed at: 06/21/2021 2008                    MDM  Number of Diagnoses or Management Options  Closed nondisplaced fracture of distal phalanx of left thumb, initial encounter: new and requires workup  Diagnosis management comments: Possible avulsion fracture vs  Normal anatomy on XR  Placed on finger splint and will have follow up with orthopedics          Amount and/or Complexity of Data Reviewed  Tests in the radiology section of CPT®: ordered and reviewed  Independent visualization of images, tracings, or specimens: yes    Risk of Complications, Morbidity, and/or Mortality  Presenting problems: low  Management options: low    Patient Progress  Patient progress: stable      Disposition  Final diagnoses:   Closed nondisplaced fracture of distal phalanx of left thumb, initial encounter     Time reflects when diagnosis was documented in both MDM as applicable and the Disposition within this note     Time User Action Codes Description Comment    6/21/2021  8:15 PM Milla, [facility] Closed nondisplaced fracture of distal phalanx of left thumb, initial encounter       ED Disposition     ED Disposition Condition Date/Time Comment    Discharge Good Mon Jun 21, 2021  8:15 PM [facility] discharge to home/self care  Follow-up Information     Follow up With Specialties Details Why Contact Info Additional Leonardo Reed [facility] Orthopedic Surgery Schedule an appointment as soon as possible for a visit today  [address]  [address] [facility] [address]  [address] [facility], [address], [address] [place], [place], [id number] [phone]          Discharge Medication List as of 6/21/2021  8:15 PM      CONTINUE these medications which have NOT CHANGED    Details   ibuprofen (MOTRIN) 400 mg tablet Take 1 tablet (400 mg total) by mouth every 6 (six) hours as needed for mild pain for up to 5 days, Starting Wed 12/2/2020, Until Mon 12/7/2020, Print      ondansetron (ZOFRAN-ODT) 4 mg disintegrating tablet Take 1 tablet (4 mg total) by mouth every 6 (six) hours as needed for nausea or vomiting, Starting Wed 12/2/2020, Print      tamsulosin (FLOMAX) 0 4 mg Take 1 capsule (0 4 mg total) by mouth daily with dinner, Starting Wed 12/2/2020, Print           No discharge procedures on file      PDMP Review     None          ED Provider  Electronically Signed by           Samy Schroeder PA-C  06/21/21 2039

## 2022-02-15 ENCOUNTER — OFFICE VISIT (OUTPATIENT)
Dept: CARDIOLOGY CLINIC | Facility: CLINIC | Age: 47
End: 2022-02-15
Payer: COMMERCIAL

## 2022-02-15 VITALS
SYSTOLIC BLOOD PRESSURE: 130 MMHG | HEIGHT: 70 IN | WEIGHT: 223 LBS | DIASTOLIC BLOOD PRESSURE: 84 MMHG | HEART RATE: 81 BPM | BODY MASS INDEX: 31.92 KG/M2

## 2022-02-15 DIAGNOSIS — E78.5 DYSLIPIDEMIA: ICD-10-CM

## 2022-02-15 DIAGNOSIS — Z82.49 FAMILY HISTORY OF ISCHEMIC HEART DISEASE (IHD): Primary | ICD-10-CM

## 2022-02-15 DIAGNOSIS — R06.00 EXERTIONAL DYSPNEA: ICD-10-CM

## 2022-02-15 PROBLEM — R06.09 EXERTIONAL DYSPNEA: Status: ACTIVE | Noted: 2022-02-15

## 2022-02-15 PROCEDURE — 99204 OFFICE O/P NEW MOD 45 MIN: CPT | Performed by: INTERNAL MEDICINE

## 2022-02-15 PROCEDURE — 93000 ELECTROCARDIOGRAM COMPLETE: CPT | Performed by: INTERNAL MEDICINE

## 2022-02-15 NOTE — PATIENT INSTRUCTIONS
Special CT scan of the chest to see if there is already plaque being formed in the blood vessels of your heart  If the test is abnormal then I will recommend atorvastatin otherwise known as Lipitor 40 mg daily as a preventive for future heart events

## 2022-02-15 NOTE — ASSESSMENT & PLAN NOTE
Given this I would like to treat his cholesterol if there is plaque formation  Patient is drug averse  Will get a CT calcium score as a decider

## 2022-02-15 NOTE — PROGRESS NOTES
Patient ID: Elise Anderson is a 51 y o  male  Plan:      Family history of ischemic heart disease (IHD)  Given this I would like to treat his cholesterol if there is plaque formation  Patient is drug averse  Will get a CT calcium score as a decider  Dyslipidemia  Recent LDL of 140  See comments under family history  Exertional dyspnea  Does not seem cardiac  Follow up Plan/Other summary comments: Follow-up depends upon the calcium score  HPI:  Patient is seen today to establish care  He is a bit worried as his father  suddenly at age 43  Autopsy revealed heart attack  His paternal grandfather had CABG in his 45s  Patient has some exertional dyspnea  He particularly notes this when walking up hills while hunting  He also notes that his knees preclude routine fast walking however  There is no chest pain or chest pressure  No syncope or near syncope  Results for orders placed or performed in visit on 02/15/22   POCT ECG    Impression    NSR  WNL  Most recent or relevant cardiac/vascular testing:    Describes normal coronary angiogram in  and normal stress test approximately  by nuclear  Past Surgical History:   Procedure Laterality Date    KNEE SURGERY       CMP:   Lab Results   Component Value Date    K 3 9 2021     2021    CO2 24 2021    BUN 12 2021    CREATININE 1 25 2021    EGFR 68 2021       Lipid Profile: No results found for: CHOL, TRIG, HDL, LDL      Review of Systems   10  point ROS  was otherwise non pertinent or negative except as per HPI or as below  Gait: Normal          Objective:     /84   Pulse 81   Ht 5' 10" (1 778 m)   Wt 101 kg (223 lb)   BMI 32 00 kg/m²     PHYSICAL EXAM:    General:  Normal appearance in no distress  Eyes:  Anicteric  Oral mucosa:  Moist   Neck:  No JVD  Carotid upstrokes are brisk without bruits  No masses  Chest:  Clear to auscultation    Cardiac:  No palpable PMI  Normal S1 and S2  No murmur gallop or rub  Abdomen:  Soft and nontender  No palpable organomegaly or aortic enlargement  Extremities:  No peripheral edema  Musculoskeletal:  Symmetric  Vascular:  Femoral pulses are brisk without bruits  Popliteal pulses are intact bilaterally  Pedal pulses are intact  Neuro:  Grossly symmetric  Psych:  Alert and oriented x3          Current Outpatient Medications:     azelastine (ASTELIN) 0 1 % nasal spray, 1 spray into each nostril 2 (two) times a day, Disp: 30 mL, Rfl: 11  No Known Allergies  Past Medical History:   Diagnosis Date    [facility]) ([facility] )     DVT of lower limb, acute (HCC)     MI (myocardial infarction) ([facility] )            Social History     Tobacco Use   Smoking Status Former Smoker    Packs/day: 0 50    Years: 24 00    Pack years: 12 00    Types: Cigarettes    Quit date:     Years since quittin 1   Smokeless Tobacco Current User   Tobacco Comment    "here and there a cigerette"

## 2022-02-17 ENCOUNTER — HOSPITAL ENCOUNTER (EMERGENCY)
Facility: HOSPITAL | Age: 47
Discharge: HOME | End: 2022-02-17
Attending: EMERGENCY MEDICINE | Admitting: EMERGENCY MEDICINE
Payer: OTHER MISCELLANEOUS

## 2022-02-17 ENCOUNTER — APPOINTMENT (EMERGENCY)
Dept: RADIOLOGY | Facility: HOSPITAL | Age: 47
End: 2022-02-17
Payer: OTHER MISCELLANEOUS

## 2022-02-17 VITALS
SYSTOLIC BLOOD PRESSURE: 155 MMHG | OXYGEN SATURATION: 97 % | HEART RATE: 88 BPM | RESPIRATION RATE: 16 BRPM | DIASTOLIC BLOOD PRESSURE: 100 MMHG

## 2022-02-17 DIAGNOSIS — S50.02XA CONTUSION OF LEFT ELBOW, INITIAL ENCOUNTER: Primary | ICD-10-CM

## 2022-02-17 DIAGNOSIS — S50.01XA CONTUSION OF RIGHT ELBOW, INITIAL ENCOUNTER: ICD-10-CM

## 2022-02-17 DIAGNOSIS — S50.312A ABRASION OF LEFT ELBOW, INITIAL ENCOUNTER: ICD-10-CM

## 2022-02-17 DIAGNOSIS — S70.01XA CONTUSION OF RIGHT HIP, INITIAL ENCOUNTER: ICD-10-CM

## 2022-02-17 PROCEDURE — 73080 X-RAY EXAM OF ELBOW: CPT

## 2022-02-17 PROCEDURE — 99283 EMERGENCY DEPT VISIT LOW MDM: CPT

## 2022-02-17 PROCEDURE — 99284 EMERGENCY DEPT VISIT MOD MDM: CPT | Performed by: EMERGENCY MEDICINE

## 2022-02-17 RX ORDER — IBUPROFEN 600 MG/1
600 TABLET ORAL ONCE
Status: COMPLETED | OUTPATIENT
Start: 2022-02-17 | End: 2022-02-17

## 2022-02-17 RX ADMIN — IBUPROFEN 600 MG: 600 TABLET ORAL at 15:13

## 2022-02-17 NOTE — ED PROVIDER NOTES
History  Chief Complaint   Patient presents with    Fall     Patient fell down 6-8 steps  Bilateral arm pain  52 yo M [place] , navigating the metal steps at the station which are wet from melting snow, slipped on the top step and skidded down on the back about alf, taking impact on both elbows, back of his head impacted at one point, and right thigh/gluteal area  He had no LOC, which his partner who witnessed affirmed, and was able to get up on his own  He has been ambulatory since then, c/o pain of both elbows, L > R, and localzing discomfort in the right posterior hip area  He has recently had a tetanus shot  History provided by:  Patient      Prior to Admission Medications   Prescriptions Last Dose Informant Patient Reported? Taking?   azelastine (ASTELIN) 0 1 % nasal spray   No No   Si spray into each nostril 2 (two) times a day      Facility-Administered Medications: None       Past Medical History:   Diagnosis Date    [facility]) ([facility] )     [facility] lower limb, acute ([facility] )     MI (myocardial infarction) ([facility] )        Past Surgical History:   Procedure Laterality Date    KNEE SURGERY         Family History   Problem Relation Age of Onset    Hypertension Mother     Heart attack Father 43            Heart failure Paternal Aunt         pacemaker    Heart disease Paternal Uncle     Heart failure Paternal Grandmother         pacemaker    Heart attack Paternal Grandfather         cabg    Heart disease Paternal Grandfather 58        heart transplant     I have reviewed and agree with the history as documented      E-Cigarette/Vaping    E-Cigarette Use Never User      E-Cigarette/Vaping Substances    Nicotine No     THC No     CBD No     Flavoring No     Other No     Unknown No      Social History     Tobacco Use    Smoking status: Former Smoker     Packs/day: 0 50     Years: 24 00     Pack years: 12 00     Types: Cigarettes     Quit date: 2018     Years since quittin 1    Smokeless tobacco: Current User    Tobacco comment: "here and there a cigerette"   Vaping Use    Vaping Use: Never used   Substance Use Topics    Alcohol use: Not Currently    Drug use: Not Currently       Review of Systems   Constitutional: Negative for appetite change, chills and fever  HENT: Negative for sore throat  Respiratory: Negative for cough, shortness of breath and wheezing  Cardiovascular: Negative for chest pain and palpitations  Gastrointestinal: Negative for abdominal pain, diarrhea, nausea and vomiting  Genitourinary: Negative for dysuria and hematuria  Musculoskeletal: Negative for neck pain  Skin: Negative for rash  Neurological: Negative for dizziness, weakness and headaches  Psychiatric/Behavioral: Negative for suicidal ideas  All other systems reviewed and are negative  Physical Exam  Physical Exam  Vitals and nursing note reviewed  Constitutional:       Appearance: Normal appearance  He is well-developed  HENT:      Head: Atraumatic  No Carroll's sign, abrasion, contusion, right periorbital erythema, left periorbital erythema or laceration  Right Ear: Tympanic membrane and external ear normal  No laceration  Left Ear: Tympanic membrane and external ear normal  No laceration  Nose: No nasal deformity, septal deviation or laceration  Mouth/Throat:      Mouth: No lacerations  Eyes:      Extraocular Movements:      Right eye: Normal extraocular motion  Left eye: Normal extraocular motion  Conjunctiva/sclera: Conjunctivae normal       Pupils: Pupils are equal, round, and reactive to light  Neck:      Trachea: Trachea and phonation normal    Cardiovascular:      Rate and Rhythm: Normal rate and regular rhythm  Pulses: Normal pulses  Heart sounds: Normal heart sounds  Pulmonary:      Effort: Pulmonary effort is normal  No respiratory distress or retractions  Chest:      Chest wall: No lacerations, deformity, tenderness or crepitus  Abdominal:      Palpations: Abdomen is soft  Tenderness: There is no abdominal tenderness  Musculoskeletal:      Right shoulder: No swelling, deformity, tenderness or crepitus  Normal range of motion  Left shoulder: No deformity, tenderness or crepitus  Normal range of motion  Right elbow: No swelling, deformity or lacerations  Normal range of motion  Tenderness present in medial epicondyle  Left elbow: Swelling present  No deformity or lacerations (abrasion medially, tenderness laterally)  Decreased range of motion  Tenderness present in lateral epicondyle  Cervical back: Full passive range of motion without pain and normal range of motion  No tenderness or bony tenderness  No spinous process tenderness  Normal range of motion  Thoracic back: No spasms, tenderness or bony tenderness  Normal range of motion  Lumbar back: No tenderness or bony tenderness  Normal range of motion  Right hip: No deformity, tenderness, bony tenderness or crepitus  Normal range of motion  Normal strength  Left hip: No deformity, tenderness or crepitus  Normal range of motion  Normal strength  Right knee: No swelling, deformity or ecchymosis  Normal range of motion  Left knee: No swelling, deformity, effusion or ecchymosis  Normal range of motion  Right ankle: No swelling, deformity or ecchymosis  Normal range of motion  Left ankle: No swelling, deformity or ecchymosis  Normal range of motion  Skin:     General: Skin is warm and dry  Neurological:      Mental Status: He is alert  GCS: GCS eye subscore is 4  GCS verbal subscore is 5  GCS motor subscore is 6  Cranial Nerves: No cranial nerve deficit  Sensory: No sensory deficit  Gait: Gait normal       Deep Tendon Reflexes: Reflexes are normal and symmetric     Psychiatric:         Speech: Speech normal          Behavior: Behavior normal          Thought Content: Thought content normal          Judgment: Judgment normal          Vital Signs  ED Triage Vitals [02/17/22 1512]   Temp Pulse Respirations Blood Pressure SpO2   -- 88 16 155/100 97 %      Temp src Heart Rate Source Patient Position - Orthostatic VS BP Location FiO2 (%)   -- -- -- -- --      Pain Score       6           Vitals:    02/17/22 1512   BP: 155/100   Pulse: 88         Visual Acuity      ED Medications  Medications   ibuprofen (MOTRIN) tablet 600 mg (600 mg Oral Given 2/17/22 1513)       Diagnostic Studies  Results Reviewed     None                 XR elbow 3+ views LEFT    (Results Pending)   XR elbow 3+ views RIGHT    (Results Pending)              Procedures  Procedures         ED Course  ED Course as of 02/17/22 1908   Thu Feb 17, 2022   1540 XR elbow 3+ views LEFT  No fracture, no deformity   1541 XR elbow 3+ views RIGHT  No fracture, no deformity                                             MDM    Disposition  Final diagnoses:   Contusion of left elbow, initial encounter   Contusion of right elbow, initial encounter   Contusion of right hip, initial encounter   Abrasion of left elbow, initial encounter     Time reflects when diagnosis was documented in both MDM as applicable and the Disposition within this note     Time User Action Codes Description Comment    2/17/2022  3:41 PM Camryn MO Add [S50 02XA] Contusion of left elbow, initial encounter     2/17/2022  3:41 PM Camryn Minder L Add [S50 01XA] Contusion of right elbow, initial encounter     2/17/2022  3:41 PM German Wong Add [S70 01XA] Contusion of right hip, initial encounter     2/17/2022  3:42 PM German Wong Add [S50 312A] Abrasion of left elbow, initial encounter       ED Disposition     ED Disposition Condition Date/Time Comment    Discharge Good u Feb 17, 2022  3:41 PM [facility] discharge to home/self care              Follow-up Information     Follow up With Specialties Details Why Contact Info Additional [address] [facility] Orth[facility] Surgery Schedule an appointment as soon as possible for a visit  If symptoms worsen [address] [facility] [address]  [address] [facility] [address]  [address] [place] [address], [address] [facility], [address] [place], [place], [address] [phone]          Discharge Medication List as of 2/17/2022  3:44 PM      CONTINUE these medications which have NOT CHANGED    Details   azelastine (ASTELIN) 0 1 % nasal spray 1 spray into each nostril 2 (two) times a day, Starting Mon 11/15/2021, Normal             No discharge procedures on file      PDMP Review     None          ED Provider  Electronically Signed by           Florence Maldonado MD  02/17/22 6356

## 2022-02-17 NOTE — DISCHARGE INSTRUCTIONS
Contusion in Adults   WHAT YOU NEED TO KNOW:   A contusion is a bruise that appears on your skin after an injury  A bruise happens when small blood vessels tear but skin does not  When blood vessels tear, blood leaks into nearby tissue, such as soft tissue or muscle  DISCHARGE INSTRUCTIONS:   Return to the emergency department if:   You have new trouble moving the injured area  You have tingling or numbness in or near the injured area  Your hand or foot below the bruise gets cold or turns pale  Contact your healthcare provider if:   You find a new lump in the injured area  Your symptoms do not improve with treatment after 4 to 5 days  Medicines: You may need any of the following:  NSAIDs  help decrease swelling and pain or fever  Follow up with your healthcare provider as directed: You may need to return within a week to check your injury again  Write down your questions so you remember to ask them during your visits  Help a contusion heal:   Rest the injured area  or use it less than usual  If you bruised your leg or foot, you may need crutches or a cane to help you walk  This will help you keep weight off your injured body part  Apply ice  to decrease swelling and pain  Ice may also help prevent tissue damage  Use an ice pack, or put crushed ice in a plastic bag  Cover it with a towel and place it on your bruise for 15 to 20 minutes every hour or as directed  Use compression  to support the area and decrease swelling  Wrap an elastic bandage around the area over the bruised muscle  Make sure the bandage is not too tight  You should be able to fit 1 finger between the bandage and your skin  Elevate (raise) your injured body part  above the level of your heart to help decrease pain and swelling  Use pillows, blankets, or rolled towels to elevate the area as often as you can  Do not drink alcohol  as directed  Alcohol may slow healing      Do not stretch injured muscles  right after your injury  Ask your healthcare provider when and how you may safely stretch after your injury  Gentle stretches can help increase your flexibility  Do not massage the area or put heating pads  on the bruise right after your injury  Heat and massage may slow healing  Your healthcare provider may tell you to apply heat after several days  At that time, heat will start to help the injury heal   Prevent another contusion:   Stretch and warm up before you play sports or exercise  Wear protective gear when you play sports  Examples are shin guards and padding       If you begin a new physical activity, start slowly to give your body a chance to adjust

## 2022-02-20 NOTE — RESULT ENCOUNTER NOTE
Informed patient of fracture seen on xray and advised to RTED for splinting  He is currently working as a  and will make his way to ED after he is finished on his call

## 2022-03-09 ENCOUNTER — APPOINTMENT (OUTPATIENT)
Dept: MRI IMAGING | Facility: HOSPITAL | Age: 47
End: 2022-03-09
Payer: COMMERCIAL

## 2022-03-09 ENCOUNTER — APPOINTMENT (EMERGENCY)
Dept: CT IMAGING | Facility: HOSPITAL | Age: 47
End: 2022-03-09
Payer: COMMERCIAL

## 2022-03-09 ENCOUNTER — APPOINTMENT (OUTPATIENT)
Dept: NON INVASIVE DIAGNOSTICS | Facility: HOSPITAL | Age: 47
End: 2022-03-09
Payer: COMMERCIAL

## 2022-03-09 ENCOUNTER — APPOINTMENT (EMERGENCY)
Dept: RADIOLOGY | Facility: HOSPITAL | Age: 47
End: 2022-03-09
Payer: COMMERCIAL

## 2022-03-09 ENCOUNTER — HOSPITAL ENCOUNTER (OUTPATIENT)
Facility: HOSPITAL | Age: 47
Setting detail: OBSERVATION
LOS: 1 days | Discharge: HOME | End: 2022-03-09
Attending: EMERGENCY MEDICINE | Admitting: STUDENT IN AN ORGANIZED HEALTH CARE EDUCATION/TRAINING PROGRAM
Payer: COMMERCIAL

## 2022-03-09 VITALS
TEMPERATURE: 96.8 F | DIASTOLIC BLOOD PRESSURE: 69 MMHG | BODY MASS INDEX: 30.05 KG/M2 | RESPIRATION RATE: 17 BRPM | WEIGHT: 209.88 LBS | SYSTOLIC BLOOD PRESSURE: 131 MMHG | HEART RATE: 76 BPM | OXYGEN SATURATION: 94 % | HEIGHT: 70 IN

## 2022-03-09 DIAGNOSIS — R20.0 FACIAL NUMBNESS: Primary | ICD-10-CM

## 2022-03-09 DIAGNOSIS — R51.9 HEADACHE: ICD-10-CM

## 2022-03-09 DIAGNOSIS — R07.9 CHEST PAIN: ICD-10-CM

## 2022-03-09 DIAGNOSIS — I63.9 CVA (CEREBRAL VASCULAR ACCIDENT) (HCC): ICD-10-CM

## 2022-03-09 PROBLEM — R29.90 STROKE-LIKE SYMPTOMS: Status: ACTIVE | Noted: 2022-03-09

## 2022-03-09 LAB
2HR DELTA HS TROPONIN: 0 NG/L
4HR DELTA HS TROPONIN: 0 NG/L
ALBUMIN SERPL BCP-MCNC: 4.1 G/DL (ref 3.5–5)
ALP SERPL-CCNC: 112 U/L (ref 34–104)
ALT SERPL W P-5'-P-CCNC: 26 U/L (ref 7–52)
ANION GAP SERPL CALCULATED.3IONS-SCNC: 7 MMOL/L (ref 4–13)
AORTIC ROOT: 3.8 CM
APICAL FOUR CHAMBER EJECTION FRACTION: 60 %
ASCENDING AORTA: 3.6 CM (ref 2.12–3.18)
AST SERPL W P-5'-P-CCNC: 15 U/L (ref 13–39)
BASOPHILS # BLD AUTO: 0.06 THOUSANDS/ΜL (ref 0–0.1)
BASOPHILS NFR BLD AUTO: 1 % (ref 0–1)
BILIRUB SERPL-MCNC: 0.35 MG/DL (ref 0.2–1)
BNP SERPL-MCNC: 28 PG/ML (ref 1–100)
BUN SERPL-MCNC: 11 MG/DL (ref 5–25)
CALCIUM SERPL-MCNC: 8.7 MG/DL (ref 8.4–10.2)
CARDIAC TROPONIN I PNL SERPL HS: 3 NG/L
CHLORIDE SERPL-SCNC: 104 MMOL/L (ref 96–108)
CHOLEST SERPL-MCNC: 197 MG/DL
CO2 SERPL-SCNC: 26 MMOL/L (ref 21–32)
CREAT SERPL-MCNC: 1.14 MG/DL (ref 0.6–1.3)
E WAVE DECELERATION TIME: 179 MS
EOSINOPHIL # BLD AUTO: 0.29 THOUSAND/ΜL (ref 0–0.61)
EOSINOPHIL NFR BLD AUTO: 4 % (ref 0–6)
ERYTHROCYTE [DISTWIDTH] IN BLOOD BY AUTOMATED COUNT: 14 % (ref 11.6–15.1)
FRACTIONAL SHORTENING: 31 % (ref 28–44)
GFR SERPL CREATININE-BSD FRML MDRD: 76 ML/MIN/1.73SQ M
GLUCOSE SERPL-MCNC: 139 MG/DL (ref 65–140)
HCT VFR BLD AUTO: 43.6 % (ref 36.5–49.3)
HDLC SERPL-MCNC: 45 MG/DL
HGB BLD-MCNC: 15.2 G/DL (ref 12–17)
IMM GRANULOCYTES # BLD AUTO: 0.01 THOUSAND/UL (ref 0–0.2)
IMM GRANULOCYTES NFR BLD AUTO: 0 % (ref 0–2)
INTERVENTRICULAR SEPTUM IN DIASTOLE (PARASTERNAL SHORT AXIS VIEW): 0.9 CM
INTERVENTRICULAR SEPTUM: 0.9 CM (ref 0.55–1.03)
LAAS-AP2: 18 CM2
LAAS-AP4: 23.5 CM2
LDLC SERPL CALC-MCNC: 134 MG/DL (ref 0–100)
LEFT ATRIUM SIZE: 3.8 CM
LEFT INTERNAL DIMENSION IN SYSTOLE: 3.7 CM (ref 3.66–5.54)
LEFT VENTRICULAR INTERNAL DIMENSION IN DIASTOLE: 5.4 CM (ref 6.06–9.03)
LEFT VENTRICULAR POSTERIOR WALL IN END DIASTOLE: 0.9 CM (ref 0.54–1.02)
LEFT VENTRICULAR STROKE VOLUME: 86 ML
LVSV (TEICH): 86 ML
LYMPHOCYTES # BLD AUTO: 2.92 THOUSANDS/ΜL (ref 0.6–4.47)
LYMPHOCYTES NFR BLD AUTO: 37 % (ref 14–44)
MAGNESIUM SERPL-MCNC: 2.2 MG/DL (ref 1.9–2.7)
MCH RBC QN AUTO: 32 PG (ref 26.8–34.3)
MCHC RBC AUTO-ENTMCNC: 34.9 G/DL (ref 31.4–37.4)
MCV RBC AUTO: 92 FL (ref 82–98)
MONOCYTES # BLD AUTO: 0.69 THOUSAND/ΜL (ref 0.17–1.22)
MONOCYTES NFR BLD AUTO: 9 % (ref 4–12)
MV E'TISSUE VEL-LAT: 10 CM/S
MV E'TISSUE VEL-SEP: 7 CM/S
MV PEAK A VEL: 0.64 M/S
MV PEAK E VEL: 53 CM/S
NEUTROPHILS # BLD AUTO: 4.02 THOUSANDS/ΜL (ref 1.85–7.62)
NEUTS SEG NFR BLD AUTO: 49 % (ref 43–75)
NONHDLC SERPL-MCNC: 152 MG/DL
NRBC BLD AUTO-RTO: 0 /100 WBCS
PLATELET # BLD AUTO: 276 THOUSANDS/UL (ref 149–390)
PMV BLD AUTO: 10.4 FL (ref 8.9–12.7)
POTASSIUM SERPL-SCNC: 3.8 MMOL/L (ref 3.5–5.3)
PROT SERPL-MCNC: 6.7 G/DL (ref 6.4–8.4)
RBC # BLD AUTO: 4.75 MILLION/UL (ref 3.88–5.62)
SL CV LEFT ATRIUM LENGTH A2C: 5.6 CM
SL CV PED ECHO LEFT VENTRICLE DIASTOLIC VOLUME (MOD BIPLANE) 2D: 144 ML
SL CV PED ECHO LEFT VENTRICLE SYSTOLIC VOLUME (MOD BIPLANE) 2D: 57 ML
SODIUM SERPL-SCNC: 137 MMOL/L (ref 135–147)
TR MAX PG: 5 MMHG
TR PEAK VELOCITY: 1.1 M/S
TRICUSPID VALVE PEAK E WAVE VELOCITY: 0.1 M/S
TRICUSPID VALVE PEAK REGURGITATION VELOCITY: 1.09 M/S
TRIGL SERPL-MCNC: 91 MG/DL
WBC # BLD AUTO: 7.99 THOUSAND/UL (ref 4.31–10.16)
Z-SCORE OF ASCENDING AORTA: 3.57
Z-SCORE OF INTERVENTRICULAR SEPTUM IN END DIASTOLE: 0.86
Z-SCORE OF LEFT VENTRICULAR DIMENSION IN END DIASTOLE: -3.15
Z-SCORE OF LEFT VENTRICULAR DIMENSION IN END SYSTOLE: -1.54
Z-SCORE OF LEFT VENTRICULAR POSTERIOR WALL IN END DIASTOLE: 0.98

## 2022-03-09 PROCEDURE — 93306 TTE W/DOPPLER COMPLETE: CPT

## 2022-03-09 PROCEDURE — 84484 ASSAY OF TROPONIN QUANT: CPT | Performed by: EMERGENCY MEDICINE

## 2022-03-09 PROCEDURE — 97163 PT EVAL HIGH COMPLEX 45 MIN: CPT

## 2022-03-09 PROCEDURE — 71045 X-RAY EXAM CHEST 1 VIEW: CPT

## 2022-03-09 PROCEDURE — 85025 COMPLETE CBC W/AUTO DIFF WBC: CPT | Performed by: EMERGENCY MEDICINE

## 2022-03-09 PROCEDURE — 96361 HYDRATE IV INFUSION ADD-ON: CPT

## 2022-03-09 PROCEDURE — 93005 ELECTROCARDIOGRAM TRACING: CPT

## 2022-03-09 PROCEDURE — 80053 COMPREHEN METABOLIC PANEL: CPT | Performed by: EMERGENCY MEDICINE

## 2022-03-09 PROCEDURE — 99285 EMERGENCY DEPT VISIT HI MDM: CPT | Performed by: EMERGENCY MEDICINE

## 2022-03-09 PROCEDURE — G1004 CDSM NDSC: HCPCS

## 2022-03-09 PROCEDURE — 93306 TTE W/DOPPLER COMPLETE: CPT | Performed by: INTERNAL MEDICINE

## 2022-03-09 PROCEDURE — 99204 OFFICE O/P NEW MOD 45 MIN: CPT | Performed by: PSYCHIATRY & NEUROLOGY

## 2022-03-09 PROCEDURE — 96375 TX/PRO/DX INJ NEW DRUG ADDON: CPT

## 2022-03-09 PROCEDURE — 92523 SPEECH SOUND LANG COMPREHEN: CPT

## 2022-03-09 PROCEDURE — 70496 CT ANGIOGRAPHY HEAD: CPT

## 2022-03-09 PROCEDURE — 80061 LIPID PANEL: CPT | Performed by: PHYSICIAN ASSISTANT

## 2022-03-09 PROCEDURE — 83880 ASSAY OF NATRIURETIC PEPTIDE: CPT | Performed by: EMERGENCY MEDICINE

## 2022-03-09 PROCEDURE — 99285 EMERGENCY DEPT VISIT HI MDM: CPT

## 2022-03-09 PROCEDURE — 99220 PR INITIAL OBSERVATION CARE/DAY 70 MINUTES: CPT | Performed by: PHYSICIAN ASSISTANT

## 2022-03-09 PROCEDURE — 36415 COLL VENOUS BLD VENIPUNCTURE: CPT | Performed by: EMERGENCY MEDICINE

## 2022-03-09 PROCEDURE — 70551 MRI BRAIN STEM W/O DYE: CPT

## 2022-03-09 PROCEDURE — 96374 THER/PROPH/DIAG INJ IV PUSH: CPT

## 2022-03-09 PROCEDURE — 83735 ASSAY OF MAGNESIUM: CPT | Performed by: EMERGENCY MEDICINE

## 2022-03-09 PROCEDURE — 70498 CT ANGIOGRAPHY NECK: CPT

## 2022-03-09 RX ORDER — CLOPIDOGREL BISULFATE 75 MG/1
75 TABLET ORAL DAILY
Status: DISCONTINUED | OUTPATIENT
Start: 2022-03-10 | End: 2022-03-09 | Stop reason: HOSPADM

## 2022-03-09 RX ORDER — ASPIRIN 81 MG/1
81 TABLET, CHEWABLE ORAL DAILY
Qty: 30 TABLET | Refills: 0 | Status: SHIPPED | OUTPATIENT
Start: 2022-03-10

## 2022-03-09 RX ORDER — ONDANSETRON 2 MG/ML
4 INJECTION INTRAMUSCULAR; INTRAVENOUS EVERY 6 HOURS PRN
Status: DISCONTINUED | OUTPATIENT
Start: 2022-03-09 | End: 2022-03-09 | Stop reason: HOSPADM

## 2022-03-09 RX ORDER — ASPIRIN 81 MG/1
81 TABLET, CHEWABLE ORAL DAILY
Status: DISCONTINUED | OUTPATIENT
Start: 2022-03-10 | End: 2022-03-09 | Stop reason: HOSPADM

## 2022-03-09 RX ORDER — CYCLOBENZAPRINE HCL 10 MG
10 TABLET ORAL ONCE
Status: COMPLETED | OUTPATIENT
Start: 2022-03-09 | End: 2022-03-09

## 2022-03-09 RX ORDER — METOCLOPRAMIDE HYDROCHLORIDE 5 MG/ML
10 INJECTION INTRAMUSCULAR; INTRAVENOUS ONCE
Status: DISCONTINUED | OUTPATIENT
Start: 2022-03-09 | End: 2022-03-09 | Stop reason: HOSPADM

## 2022-03-09 RX ORDER — DIPHENHYDRAMINE HYDROCHLORIDE 50 MG/ML
25 INJECTION INTRAMUSCULAR; INTRAVENOUS EVERY 6 HOURS PRN
Status: DISCONTINUED | OUTPATIENT
Start: 2022-03-09 | End: 2022-03-09 | Stop reason: HOSPADM

## 2022-03-09 RX ORDER — CLOPIDOGREL BISULFATE 75 MG/1
300 TABLET ORAL ONCE
Status: COMPLETED | OUTPATIENT
Start: 2022-03-09 | End: 2022-03-09

## 2022-03-09 RX ORDER — METOCLOPRAMIDE HYDROCHLORIDE 5 MG/ML
10 INJECTION INTRAMUSCULAR; INTRAVENOUS ONCE
Status: COMPLETED | OUTPATIENT
Start: 2022-03-09 | End: 2022-03-09

## 2022-03-09 RX ORDER — DEXAMETHASONE SODIUM PHOSPHATE 4 MG/ML
8 INJECTION, SOLUTION INTRA-ARTICULAR; INTRALESIONAL; INTRAMUSCULAR; INTRAVENOUS; SOFT TISSUE ONCE
Status: COMPLETED | OUTPATIENT
Start: 2022-03-09 | End: 2022-03-09

## 2022-03-09 RX ORDER — SODIUM CHLORIDE 9 MG/ML
3 INJECTION INTRAVENOUS
Status: DISCONTINUED | OUTPATIENT
Start: 2022-03-09 | End: 2022-03-09 | Stop reason: HOSPADM

## 2022-03-09 RX ORDER — ATORVASTATIN CALCIUM 40 MG/1
40 TABLET, FILM COATED ORAL EVERY EVENING
Status: DISCONTINUED | OUTPATIENT
Start: 2022-03-09 | End: 2022-03-09 | Stop reason: HOSPADM

## 2022-03-09 RX ORDER — DIPHENHYDRAMINE HYDROCHLORIDE 50 MG/ML
50 INJECTION INTRAMUSCULAR; INTRAVENOUS ONCE
Status: COMPLETED | OUTPATIENT
Start: 2022-03-09 | End: 2022-03-09

## 2022-03-09 RX ORDER — ASPIRIN 325 MG
325 TABLET ORAL ONCE
Status: COMPLETED | OUTPATIENT
Start: 2022-03-09 | End: 2022-03-09

## 2022-03-09 RX ORDER — ACETAMINOPHEN 325 MG/1
650 TABLET ORAL EVERY 4 HOURS PRN
Status: DISCONTINUED | OUTPATIENT
Start: 2022-03-09 | End: 2022-03-09 | Stop reason: HOSPADM

## 2022-03-09 RX ORDER — ATORVASTATIN CALCIUM 40 MG/1
40 TABLET, FILM COATED ORAL EVERY EVENING
Qty: 30 TABLET | Refills: 0 | Status: SHIPPED | OUTPATIENT
Start: 2022-03-09

## 2022-03-09 RX ORDER — KETOROLAC TROMETHAMINE 30 MG/ML
15 INJECTION, SOLUTION INTRAMUSCULAR; INTRAVENOUS ONCE
Status: COMPLETED | OUTPATIENT
Start: 2022-03-09 | End: 2022-03-09

## 2022-03-09 RX ORDER — NICOTINE 21 MG/24HR
1 PATCH, TRANSDERMAL 24 HOURS TRANSDERMAL DAILY
Status: DISCONTINUED | OUTPATIENT
Start: 2022-03-09 | End: 2022-03-09 | Stop reason: HOSPADM

## 2022-03-09 RX ADMIN — ASPIRIN 325 MG: 325 TABLET ORAL at 08:44

## 2022-03-09 RX ADMIN — PERFLUTREN 0.4 ML/MIN: 6.52 INJECTION, SUSPENSION INTRAVENOUS at 10:40

## 2022-03-09 RX ADMIN — CLOPIDOGREL BISULFATE 300 MG: 75 TABLET ORAL at 14:55

## 2022-03-09 RX ADMIN — SODIUM CHLORIDE 1000 ML: 0.9 INJECTION, SOLUTION INTRAVENOUS at 06:37

## 2022-03-09 RX ADMIN — KETOROLAC TROMETHAMINE 15 MG: 30 INJECTION, SOLUTION INTRAMUSCULAR; INTRAVENOUS at 08:01

## 2022-03-09 RX ADMIN — DEXAMETHASONE SODIUM PHOSPHATE 8 MG: 4 INJECTION, SOLUTION INTRAMUSCULAR; INTRAVENOUS at 08:01

## 2022-03-09 RX ADMIN — IOHEXOL 85 ML: 350 INJECTION, SOLUTION INTRAVENOUS at 06:49

## 2022-03-09 RX ADMIN — METOCLOPRAMIDE HYDROCHLORIDE 10 MG: 5 INJECTION INTRAMUSCULAR; INTRAVENOUS at 06:33

## 2022-03-09 RX ADMIN — CYCLOBENZAPRINE HYDROCHLORIDE 10 MG: 10 TABLET, FILM COATED ORAL at 12:49

## 2022-03-09 RX ADMIN — DIPHENHYDRAMINE HYDROCHLORIDE 50 MG: 50 INJECTION INTRAMUSCULAR; INTRAVENOUS at 06:39

## 2022-03-09 RX ADMIN — KETOROLAC TROMETHAMINE 15 MG: 30 INJECTION, SOLUTION INTRAMUSCULAR at 12:49

## 2022-03-09 NOTE — ASSESSMENT & PLAN NOTE
· Patient with history of MI  · Patient with left sided chest pain and left arm pain since 3/7/2022  · HS troponin I at 0hr: 3, at 2 hr: 3 with delta of 0, 4 hr: 3 with deta of 0  · EKG: NSR with HR 67, non specific t wave changes  · Echocardiogram in the setting of stroke workup- read pending  · Lipitor 40 mg po daily and Plavix/aspirin 81 mg po daily initiated in the setting of stroke  · CXR: No acute cardiopulmonary disease    · Outpatient follow-up with cardiology

## 2022-03-09 NOTE — ED ATTENDING ATTESTATION
3/9/2022  I, [facility], DO, saw and evaluated the patient  I have discussed the patient with the resident/non-physician practitioner and agree with the resident's/non-physician practitioner's findings, Plan of Care, and MDM as documented in the resident's/non-physician practitioner's note, except where noted  All available labs and Radiology studies were reviewed  I was present for key portions of any procedure(s) performed by the resident/non-physician practitioner and I was immediately available to provide assistance  At this point I agree with the current assessment done in the Emergency Department  I have conducted an independent evaluation of this patient a history and physical is as follows:    ED Course  ED Course as of 03/09/22 0929   Wed Mar 09, 2022   5688 Received signout from Dr Katia Chong  8598 CTA of the head and neck, is unremarkable in this 46 year right-hand dominant [facility] [place] [employer] [place], presents the emergency department with chest pain which started was constant since March 6 evening into Monday morning  Pain in his chest has been constant  First set of cardiac enzymes are 3  Followed by Dr Selvin Smart from cardiology because the patient's father had a confirmed fatal MI via autopsy at age 43, patient had a cardiac catheterization in 2010 which was normal along with a follow-up stress test in 2015 which was normal   Patient went to bed last evening at 930 pm not complaining of a headache, awoke at 4:30 a m in which the wife noticed that his face seemed on the left side weaker than the other with no dysarthria  Patient also reported having left arm and left full facial numbness  Will consult neurology  0800 Dr Breonna Dickerson from neurology, on call for [facility] contacted via tiger text     0565 Case d/w Dr Breonna Dickerson, neurology on-call, reviewed history and physical and CTA of the head and neck imaging felt the patient should be admitted to the stroke pathway 0197 Patient updated in terms of plan of care  8056 Patient referred to [facility] for admission via tiger text  [address] accept pt for admission     This is a very pleasant 41-year-old presents the emergency department with a 3 day history of substernal chest pain is constant in nature this subsequently subsided followed by a 2 day history a generalized headache followed by a 4 hour history of left-sided facial numbness and upper extremity numbness and tingling with pain to the left pinky area his left hand  Patient denies dizziness dysarthria  Due to the symptoms persisting for approximately 72 hours prior to onset of the facial numbness stroke alert was not called, main reason why the patient came to the emergency department today was because of his chest pain and strong family history of premature cardiovascular death  CTA of the head and neck negative, case discussed with Neurology, will admit patient to stroke pathway      Critical Care Time  Procedures

## 2022-03-09 NOTE — H&P
[address]  H&P- Khushboo Sheikh 1975, 51 y o  male MRN: [id number]  Unit/Bed#: ED 20 Encounter: [id number]  Primary Care Provider: Amaya Ayala MD   Date and time admitted to hospital: 3/9/2022  5:37 AM    * Stroke-like symptoms  Assessment & Plan  · Patient with left facial numbness, left arm numbness and HA that started this morning  · CTA head/neck:   No acute intracranial abnormality  Negative CTA head and neck for large vessel occlusion, dissection, aneurysm, or high-grade stenosis  Additional chronic/incidental findings as detailed above  · Stroke pathway on telemetry with neuro checks  · Check MRI brain  · Echocardiogram  · Check lipid panel, hgb A1c  · Start Lipitor 40 mg po daily, aspirin 81 mg po daily  · PT/OT  · Neurology consult    Chest pain  Assessment & Plan  · Patient with history of MI  · Patient with left sided chest pain and left arm pain since 3/7/2022  · HS troponin I at 0hr: 3, at 2 hr: 3 with delta of 0, 4 hr pending  · EKG: NSR with HR 67, non specific t wave changes  · Echocardiogram in the setting of stroke workup  · Lipitor 40 mg po daily and aspirin 81 mg po daily initiated in the setting of stroke workup  · CXR: No acute cardiopulmonary disease  VTE Pharmacologic Prophylaxis: VTE Score: 2 Low Risk (Score 0-2) - Encourage Ambulation  Code Status: Level 1 - Full Code   Discussion with family: Updated  (wife) at bedside  Anticipated Length of Stay: Patient will be admitted on an observation basis with an anticipated length of stay of less than 2 midnights secondary to monitoring and workup for stroke-like symptoms with MRI, echo, neuro consult  Total Time for Visit, including Counseling / Coordination of Care: 60 minutes Greater than 50% of this total time spent on direct patient counseling and coordination of care      Chief Complaint: left chest pain, left arm pain, left facial numbness, headache    History of Present Illness:  Khushboo Sheikh is a 51 y o  male with a PMH of MI, tobacco use who presents with a complaint of left chest pain, left arm pain, left facial numbness, and headache  The patient states his chest pain and left arm pain started on Monday 3/7/2022  He describes it as a pressure and rates it a 4/10  He states he continues to have this pain  The patient states he woke yesterday morning with a headache and left facial numbness  He admits to photophobia and states he had difficulty focusing when attempting to read  He states his blood pressure was elevated during that time at 190's/80's  The patient's wife at the bedside felt the patient had a left sided facial droop this morning  He rates his headache a 5/10 and states it's in the front of his head  ED attending spoke to Neurology who recommending admission for stroke workup  The patient denies any shortness of breath, abdominal pain, nausea/vomiting, or diarrhea  He denies fevers/chills  Review of Systems:  Review of Systems   Constitutional: Negative  HENT: Negative  Cardiovascular: Positive for chest pain  Neurological: Positive for facial asymmetry, numbness and headaches  Past Medical and Surgical History:   Past Medical History:   Diagnosis Date    [facility]     [facility]     [facility] ([facility] 2007       Past Surgical History:   Procedure Laterality Date    KNEE SURGERY  2010       Meds/Allergies:  Prior to Admission medications    Medication Sig Start Date End Date Taking? Authorizing Provider   azelastine (ASTELIN) 0 1 % nasal spray 1 spray into each nostril 2 (two) times a day 11/15/21   Diana Day DO     I have reviewed home medications with patient personally      Allergies: No Known Allergies    Social History:  Marital Status: Single   Occupation: unknown  Patient Pre-hospital Living Situation: Home  Patient Pre-hospital Level of Mobility: [place]  Patient Pre-hospital Diet Restrictions: none  Substance Use History:   Social History     Substance and Sexual Activity   Alcohol Use Not Currently     Social History     Tobacco Use   Smoking Status Former Smoker    Packs/day: 0 50    Years: 24 00    Pack years: 12 00    Types: Cigarettes    Quit date:     Years since quittin 1   Smokeless Tobacco Current User   Tobacco Comment    "here and there a cigerette"     Social History     Substance and Sexual Activity   Drug Use Not Currently       Family History:  Family History   Problem Relation Age of Onset    Hypertension Mother     Heart attack Father 43            Heart failure Paternal Aunt         pacemaker    Heart disease Paternal Uncle     Heart failure Paternal Grandmother         pacemaker    Heart attack Paternal Grandfather         cabg    Heart disease Paternal Grandfather 58        heart transplant       Physical Exam:     Vitals:   Blood Pressure: 120/81 (22 1030)  Pulse: 60 (22 1030)  Temperature: (!) 96 8 °F (36 °C) (22 1030)  Temp Source: Tympanic (22 0546)  Respirations: 17 (22 1030)  Weight - Scale: 95 2 kg (209 lb 14 1 oz) (22 0546)  SpO2: 96 % (22 1030)    Physical Exam  Vitals and nursing note reviewed  Constitutional:       General: He is awake  Appearance: Normal appearance  HENT:      Head: Normocephalic and atraumatic  Cardiovascular:      Rate and Rhythm: Normal rate and regular rhythm  Pulmonary:      Effort: Pulmonary effort is normal       Breath sounds: Normal breath sounds  Abdominal:      Palpations: Abdomen is soft  Tenderness: There is no abdominal tenderness  Skin:     General: Skin is warm and dry  Neurological:      Mental Status: He is alert and oriented to person, place, and time  Sensory: Sensory deficit (left facial numbness) present  Motor: Weakness (slight decreased left hand ) present     Psychiatric: Attention and Perception: Attention normal          Mood and Affect: Mood normal          Speech: Speech normal          Behavior: Behavior is cooperative  Additional Data:     Lab Results:  Results from last 7 days   Lab Units 03/09/22  0613   WBC Thousand/uL 7 99   HEMOGLOBIN g/dL 15 2   HEMATOCRIT % 43 6   PLATELETS Thousands/uL 276   NEUTROS PCT % 49   LYMPHS PCT % 37   MONOS PCT % 9   EOS PCT % 4     Results from last 7 days   Lab Units 03/09/22  0613   SODIUM mmol/L 137   POTASSIUM mmol/L 3 8   CHLORIDE mmol/L 104   CO2 mmol/L 26   BUN mg/dL 11   CREATININE mg/dL 1 14   ANION GAP mmol/L 7   CALCIUM mg/dL 8 7   ALBUMIN g/dL 4 1   TOTAL BILIRUBIN mg/dL 0 35   ALK PHOS U/L 112*   ALT U/L 26   AST U/L 15   GLUCOSE RANDOM mg/dL 139                       Imaging: Reviewed radiology reports from this admission including: chest xray and CT head  CTA head and neck with and without contrast   Final Result by Nica Gerard MD (03/09 2131)      No acute intracranial abnormality  Negative CTA head and neck for large vessel occlusion, dissection, aneurysm, or high-grade stenosis  Additional chronic/incidental findings as detailed above  Workstation performed: [id number]         X-ray chest 1 view portable   Final Result by Stephany Little MD (03/09 7218)      No acute cardiopulmonary disease  Workstation performed: [id number]         MRI Inpatient Order    (Results Pending)       EKG and Other Studies Reviewed on Admission:   · EKG: NSR  HR 67     ** Please Note: This note has been constructed using a voice recognition system   **

## 2022-03-09 NOTE — ASSESSMENT & PLAN NOTE
· Patient with left facial numbness, left arm numbness and HA that started this morning  · CTA head/neck:   No acute intracranial abnormality  Negative CTA head and neck for large vessel occlusion, dissection, aneurysm, or high-grade stenosis  Additional chronic/incidental findings as detailed above  · Stroke pathway on telemetry with neuro checks  · MRI brain:   No acute intracranial abnormality  Mild chronic microangiopathy  · Echocardiogram- pending read on discharge- outpatient follow-up with PCP or cardiology regarding results  · Lipid panel, hgb A1c- pending   · Started Lipitor 40 mg po daily, aspirin 81 mg po daily  Patient given Plavix 300 mg po x 1 dose prior to discharge  Patient discharged home on Lipitor 40 mg daily, DAPT with aspirin 81 mg and Plavix 75 mg daily x 3 weeks then aspirin 81 mg daily only thereafter  · Neurology consultation appreciated despite negative MRI neurology felt this was still a CVA   Outpatient follow-up with Neurology

## 2022-03-09 NOTE — Clinical Note
Case was discussed with Dr Chico Willams and the patient's admission status was agreed to be Admission Status: inpatient status to the service of Dr Merlinda Mail

## 2022-03-09 NOTE — SPEECH THERAPY NOTE
Speech Language/Pathology  Speech/Language Pathology  Assessment    Patient Name: Blanca Mendenhall  GTLLW'O Date: 3/9/2022     Problem List  Principal Problem:    CVA (cerebral vascular accident) [facility])  Active Problems:    Chest pain    Past Medical History  Past Medical History:   Diagnosis Date    [facility]     [facility]     [facility] ) 2007     Past Surgical History  Past Surgical History:   Procedure Laterality Date    KNEE SURGERY  2010 03/09/22 1330   Patient Information   Current Medical chest pain, CVA pathway   Special Studies CT, MRI   Past Medical History cardiac hx   Cognition   Overall Cognitive Status [facility]   Arousal/Participation Alert; Responsive; Cooperative   Attention Within functional limits   Orientation Level Oriented X4   Memory Within functional limits   Following Commands Follows all commands and directions without difficulty   Speech/Swallow Mechanism Exam   Labial Symmetry WFL   Labial Strength WFL   Labial ROM WFL   Labial Sensation WFL   Facial Symmetry Left droop   Facial Strength WFL   Facial ROM WFL   Facial Sensation WFL   Lingual Symmetry WFL   Lingual Strength WFL   Lingual ROM WFL   Lingual Sensation WFL   Dentition Adequate   Volitional Cough Strong   Respirations 17   SpO2 94 %   Motor Speech Evaluation   Respiration/Phonation WFL   Vocal Quality WFL   Dysarthria No   Intelligibility Intelligible   Auditory Comprehension   Word Level Comprehension WFL   Yes/No Questions WFL   Commands WFL   Comphrehends Conversation Complex   Reading Comprehension   Reading Status WFL   Expression   Primary Mode of Expression Verbal   Verbal Expression   Repetition No impairment   Automatic Speech WFL   Naming WFL   Narrative Speech [facility]   Written Expression   Written Expression WFL   Summary   Speech/Language Summary Patient received upright in bed with wife present    Pt good overall historian, reporting that he doesn't have any dysarthria  Pt completed complex conversation and all portions of the MAST with no complications  Pt reports no difficulties with swallow, symptoms improving but not resolved  Patient does not require ST follow up

## 2022-03-09 NOTE — ED NOTES
Patient OOB to the bathroom to void, cup given for a urine sample collection       Franc Johnson RN  03/09/22 8512

## 2022-03-09 NOTE — PLAN OF CARE
Problem: PHYSICAL THERAPY ADULT  Goal: Performs mobility at highest level of function for planned discharge setting  See evaluation for individualized goals  Description: Treatment/Interventions: Functional transfer training,Endurance training,Gait training,Spoke to nursing          See flowsheet documentation for full assessment, interventions and recommendations  Note: Prognosis: Good  Problem List: Decreased endurance,Pain,Decreased mobility,Impaired balance  Assessment: Pt is 51 y o  male seen for PT evaluation s/p admit to [address] [place] [address] on 3/9/2022 w/ Stroke-like symptoms  PT consulted to assess pt's functional mobility and d/c needs  Order placed for PT eval and tx order  Comorbidities affecting pt's physical performance at time of assessment include: stroke-like symptoms, chest pain,exertional dyspnea, potential for falls  PTA, pt was independent w/ all functional mobility w/ o AD usage  Personal factors affecting pt at time of IE include: inability to navigate community distances, positive fall history and inability to perform IADLs  Please find objective findings from PT assessment regarding body systems outlined above with impairments and limitations including impaired balance, decreased endurance, gait deviations, pain, decreased activity tolerance and fall risk  From PT/mobility standpoint, recommendation at time of d/c would be no rehabilitation needs pending progress in order to facilitate return to PLOF  PT Discharge Recommendation: No rehabilitation needs          See flowsheet documentation for full assessment

## 2022-03-09 NOTE — ED NOTES
Patient transported to [address] [place] [address], [address] [place] [address]  03/09/22 8048

## 2022-03-09 NOTE — ED PROVIDER NOTES
History  Chief Complaint   Patient presents with    Chest Pain     Chest pain started 2 days ago, took multiple medications over past 2 days for pain  49-YEAR-OLD MALE    PMH:  1 PACK-A-DAY SMOKER, quit 2 years ago  FAMILY HISTORY:  FATHER  had terminal MI at 43      Chief complaint: CP  Started Monday  PAIN comes and goes, substernal and Left chest   Pressure in the chest currently, /10    PATIENT HAS HAD SOME SHORTNESS OF BREATH  NO COMPLAINTS OF DIAPHORESIS  HE DENIES ANY RADIATION OF PAIN TO THE JAW/NECK OR THE BACK  INTERVENTIONS:   TOOK TYLENOL #3 FOR HEADACHE  FELT BOATED, TOOK GAS X    HEADACHE HAS BEEN GOING ON FOR 2 DAYS AS WELL  PAIN IN THE HEAD IS 8/10  NO NECK PAIN   SAID YESTERDAY HE COULDN'T READ DUE TO THE HEADACHE  PT HAS HAD ARM NUMBNESS SINCE YESTERDAY, SINCE 4:30 PT WOKE UP HE HAD NUMBNESS THE LEFT LOWER FACE AS WELL       PATIENT DENIES ANY COUGH, NO FEVERS OR CHILLS  NO URI SYMPTOMS      VTE  RISK FACTORS:  NONE  NO HISTORY OF PE OR DVT  NO LONG CAR RIDES OR PLANE RIDES  NO IMMOBILIZATION  NO RECENT SURGERY OR TRAUMA  NO HEMOPTYSIS  OTHER ASSOCIATED SYMPTOMS:  NO ABDOMINAL PAIN  NO NAUSEA OR VOMITING  NO STOOL CHANGES    NO URINARY COMPLAINTS: NO DYSURIA, NO HEMATURIA, NO FREQUENCY        History provided by:  Patient  Chest Pain  Pain location:  Substernal area and L chest  Pain radiates to:  Does not radiate  Pain severity:  Moderate  Onset quality:  Gradual  Chronicity:  New  Relieved by:  Nothing  Worsened by:  Nothing tried  Ineffective treatments:  None tried  Associated symptoms: numbness (LEFT ARM, LEFT FACE )    Associated symptoms: no abdominal pain, no anxiety, no back pain, no cough, no diaphoresis, no dizziness, no fatigue, no fever, no headache, no heartburn, no lower extremity edema, no nausea, no near-syncope, no palpitations, no shortness of breath, no syncope, not vomiting and no weakness        Prior to Admission Medications Prescriptions Last Dose Informant Patient Reported? Taking?   azelastine (ASTELIN) 0 1 % nasal spray   No No   Si spray into each nostril 2 (two) times a day      Facility-Administered Medications: None       Past Medical History:   Diagnosis Date    COPD (chronic obstructive pulmonary disease) ([place] [address] )     DVT of lower limb, acute ([place] [address] )     MI (myocardial infarction) ([place] [address] )        Past Surgical History:   Procedure Laterality Date    KNEE SURGERY  2010       Family History   Problem Relation Age of Onset    Hypertension Mother     Heart attack Father 43            Heart failure Paternal Aunt         pacemaker    Heart disease Paternal Uncle     Heart failure Paternal Grandmother         pacemaker    Heart attack Paternal Grandfather         cabg    Heart disease Paternal Grandfather 58        heart transplant     I have reviewed and agree with the history as documented  E-Cigarette/Vaping    E-Cigarette Use Never User      E-Cigarette/Vaping Substances    Nicotine No     THC No     CBD No     Flavoring No     Other No     Unknown No      Social History     Tobacco Use    Smoking status: Former Smoker     Packs/day: 0 50     Years: 24 00     Pack years: 12 00     Types: Cigarettes     Quit date:      Years since quittin 1    Smokeless tobacco: Current User    Tobacco comment: "here and there a cigerette"   Vaping Use    Vaping Use: Never used   Substance Use Topics    Alcohol use: Not Currently    Drug use: Not Currently       Review of Systems   Constitutional: Negative for chills, diaphoresis, fatigue and fever  Respiratory: Negative for cough, shortness of breath, wheezing and stridor  Cardiovascular: Negative for chest pain, palpitations, leg swelling, syncope and near-syncope  Gastrointestinal: Negative for abdominal pain, blood in stool, heartburn, nausea and vomiting  Genitourinary: Negative for difficulty urinating, dysuria, flank pain and frequency  Musculoskeletal: Negative for arthralgias, back pain, gait problem, joint swelling, myalgias, neck pain and neck stiffness  Skin: Negative for rash and wound  Neurological: Positive for numbness (LEFT ARM, LEFT FACE )  Negative for dizziness, weakness, light-headedness and headaches  All other systems reviewed and are negative  Physical Exam  Physical Exam  Constitutional:       General: He is not in acute distress  Appearance: He is well-developed  He is not ill-appearing, toxic-appearing or diaphoretic  HENT:      Head: Normocephalic and atraumatic  Nose: Nose normal       Mouth/Throat:      Pharynx: No oropharyngeal exudate  Eyes:      General: No scleral icterus  Right eye: No discharge  Left eye: No discharge  Conjunctiva/sclera: Conjunctivae normal       Pupils: Pupils are equal, round, and reactive to light  Neck:      Vascular: No JVD  Trachea: No tracheal deviation  Cardiovascular:      Rate and Rhythm: Normal rate and regular rhythm  Pulses:           Carotid pulses are 2+ on the right side and 2+ on the left side  Heart sounds: Normal heart sounds  No murmur heard  No friction rub  No gallop  Pulmonary:      Effort: Pulmonary effort is normal  No respiratory distress  Breath sounds: Normal breath sounds  No stridor  No wheezing or rales  Chest:      Chest wall: No tenderness  Abdominal:      General: Bowel sounds are normal  There is no distension  Palpations: Abdomen is soft  There is no mass  Tenderness: There is no abdominal tenderness  There is no guarding or rebound  Hernia: No hernia is present  Musculoskeletal:         General: No tenderness or deformity  Normal range of motion  Cervical back: Normal range of motion and neck supple  Lymphadenopathy:      Cervical: No cervical adenopathy  Skin:     General: Skin is warm  Capillary Refill: Capillary refill takes less than 2 seconds  Coloration: Skin is not cyanotic or pale  Findings: No erythema or rash  Neurological:      General: No focal deficit present  Mental Status: He is alert and oriented to person, place, and time  Cranial Nerves: No cranial nerve deficit  Sensory: No sensory deficit  Motor: No abnormal muscle tone  Coordination: Coordination normal    Psychiatric:         Behavior: Behavior normal          Thought Content:  Thought content normal          Judgment: Judgment normal          Vital Signs  ED Triage Vitals [03/09/22 0546]   Temperature Pulse Respirations Blood Pressure SpO2   (!) 97 1 °F (36 2 °C) 71 16 139/88 97 %      Temp Source Heart Rate Source Patient Position - Orthostatic VS BP Location FiO2 (%)   Tympanic Monitor Lying Left arm --      Pain Score       6           Vitals:    03/09/22 1130 03/09/22 1230 03/09/22 1330 03/09/22 1454   BP: 127/76 119/81  131/69   Pulse: 69 66 79 76   Patient Position - Orthostatic VS: Lying Sitting Sitting Lying         Visual Acuity  Visual Acuity      Most Recent Value   L Pupil Size (mm) 3   R Pupil Size (mm) 3   L Pupil Shape Round   R Pupil Shape Round          ED Medications  Medications   metoclopramide (REGLAN) injection 10 mg (10 mg Intravenous Given 3/9/22 0633)   diphenhydrAMINE (BENADRYL) injection 50 mg (50 mg Intravenous Given 3/9/22 0639)   sodium chloride 0 9 % bolus 1,000 mL (0 mL Intravenous Stopped 3/9/22 0809)   iohexol (OMNIPAQUE) 350 MG/ML injection (SINGLE-DOSE) 85 mL (85 mL Intravenous Given 3/9/22 0649)   ketorolac (TORADOL) injection 15 mg (15 mg Intravenous Given 3/9/22 0801)   dexamethasone (DECADRON) injection 8 mg (8 mg Intravenous Given 3/9/22 0801)   aspirin tablet 325 mg (325 mg Oral Given 3/9/22 0844)   perflutren lipid microsphere (DEFINITY) injection (0 4 mL/min Intravenous Given 3/9/22 1040)   ketorolac (TORADOL) injection 15 mg (15 mg Intravenous Given 3/9/22 1249)   cyclobenzaprine (FLEXERIL) tablet 10 mg (10 mg Oral Given 3/9/22 1249)   clopidogrel (PLAVIX) tablet 300 mg (300 mg Oral Given 3/9/22 1455)       Diagnostic Studies  Results Reviewed     Procedure Component Value Units Date/Time    Lipid panel [[id number]]  (Abnormal) Collected: 03/09/22 0613    Lab Status: Final result Specimen: Blood from Arm, Right Updated: 03/09/22 1525     Cholesterol 197 mg/dL      Triglycerides 91 mg/dL      HDL, Direct 45 mg/dL      LDL Calculated 134 mg/dL      Non-HDL-Chol (CHOL-HDL) 152 mg/dl     HS Troponin I 4hr [[id number]]  (Normal) Collected: 03/09/22 1004    Lab Status: Final result Specimen: Blood from Arm, Right Updated: 03/09/22 1045     hs TnI 4hr 3 ng/L      Delta 4hr hsTnI 0 ng/L     B-Type Natriuretic Peptide(BNP) CA, , [facility] Only [[id number]]  (Normal) Collected: 03/09/22 0613    Lab Status: Final result Specimen: Blood from Arm, Right Updated: 03/09/22 0845     BNP 28 pg/mL     HS Troponin I 2hr [[id number]]  (Normal) Collected: 03/09/22 0800    Lab Status: Final result Specimen: Blood from Arm, Right Updated: 03/09/22 0827     hs TnI 2hr 3 ng/L      Delta 2hr hsTnI 0 ng/L     HS Troponin 0hr (reflex protocol) [[id number]]  (Normal) Collected: 03/09/22 0613    Lab Status: Final result Specimen: Blood from Arm, Right Updated: 03/09/22 0644     hs TnI 0hr 3 ng/L     Magnesium [[id number]]  (Normal) Collected: 03/09/22 0613    Lab Status: Final result Specimen: Blood from Arm, Right Updated: 03/09/22 0636     Magnesium 2 2 mg/dL     Comprehensive metabolic panel [[id number]]  (Abnormal) Collected: 03/09/22 0613    Lab Status: Final result Specimen: Blood from Arm, Right Updated: 03/09/22 0636     Sodium 137 mmol/L      Potassium 3 8 mmol/L      Chloride 104 mmol/L      CO2 26 mmol/L      ANION GAP 7 mmol/L      BUN 11 mg/dL      Creatinine 1 14 mg/dL      Glucose 139 mg/dL      Calcium 8 7 mg/dL      AST 15 U/L      ALT 26 U/L      Alkaline Phosphatase 112 U/L      Total Protein 6 7 g/dL      Albumin 4 1 g/dL      Total Bilirubin 0 35 mg/dL      eGFR 76 ml/min/1 73sq m     Narrative:      [place] guidelines for Chronic Kidney Disease (CKD):     Stage 1 with normal or high GFR (GFR > 90 mL/min/1 73 square meters)    Stage 2 Mild CKD (GFR = 60-89 mL/min/1 73 square meters)    Stage 3A Moderate CKD (GFR = 45-59 mL/min/1 73 square meters)    Stage 3B Moderate CKD (GFR = 30-44 mL/min/1 73 square meters)    Stage 4 Severe CKD (GFR = 15-29 mL/min/1 73 square meters)    Stage 5 End Stage CKD (GFR <15 mL/min/1 73 square meters)  Note: GFR calculation is accurate only with a steady state creatinine    CBC and differential [[id number]] Collected: 03/09/22 0613    Lab Status: Final result Specimen: Blood from Arm, Right Updated: 03/09/22 0624     WBC 7 99 Thousand/uL      RBC 4 75 Million/uL      Hemoglobin 15 2 g/dL      Hematocrit 43 6 %      MCV 92 fL      MCH 32 0 pg      MCHC 34 9 g/dL      RDW 14 0 %      MPV 10 4 fL      Platelets 334 Thousands/uL      nRBC 0 /100 WBCs      Neutrophils Relative 49 %      Immat GRANS % 0 %      Lymphocytes Relative 37 %      Monocytes Relative 9 %      Eosinophils Relative 4 %      Basophils Relative 1 %      Neutrophils Absolute 4 02 Thousands/µL      Immature Grans Absolute 0 01 Thousand/uL      Lymphocytes Absolute 2 92 Thousands/µL      Monocytes Absolute 0 69 Thousand/µL      Eosinophils Absolute 0 29 Thousand/µL      Basophils Absolute 0 06 Thousands/µL                  MRI brain wo contrast   Final Result by Ailyn Perez MD (03/09 9702)      No acute intracranial abnormality  Mild chronic microangiopathy  Workstation performed: [id number]         CTA head and neck with and without contrast   Final Result by Ailyn Perez MD (03/09 6025)      No acute intracranial abnormality  Negative CTA head and neck for large vessel occlusion, dissection, aneurysm, or high-grade stenosis        Additional chronic/incidental findings as detailed above  Workstation performed: [id number]         X-ray chest 1 view portable   Final Result by Jyoti Torres MD (03/09 4368)      No acute cardiopulmonary disease  Workstation performed: [id number]                    Procedures  Procedures         ED Course  ED Course as of 03/09/22 2246   Wed Mar 09, 2022   0369 CBC and differential   8543 Sign out:     Dw Dr Rice Fillers  Will f/u on studies and assist w/ dispo             HEART Risk Score      Most Recent Value   Heart Score Risk Calculator    History 1 Filed at: 03/09/2022 0645   ECG 0 Filed at: 03/09/2022 0645   Age 1 Filed at: 03/09/2022 0645   Risk Factors 1 Filed at: 03/09/2022 0645   Troponin 0 Filed at: 03/09/2022 0645   HEART Score 3 Filed at: 03/09/2022 0645                        SBIRT 22yo+      Most Recent Value   SBIRT (23 yo +)    In order to provide better care to our patients, we are screening all of our patients for alcohol and drug use  Would it be okay to ask you these screening questions? Yes Filed at: 03/09/2022 0554   Initial Alcohol Screen: US AUDIT-C     1  How often do you have a drink containing alcohol? 0 Filed at: 03/09/2022 0554   2  How many drinks containing alcohol do you have on a typical day you are drinking? 0 Filed at: 03/09/2022 0554   3a  Male UNDER 65: How often do you have five or more drinks on one occasion? 0 Filed at: 03/09/2022 0554   3b  FEMALE Any Age, or MALE 65+: How often do you have 4 or more drinks on one occassion? 0 Filed at: 03/09/2022 0554   Audit-C Score 0 Filed at: 03/09/2022 7832   JOVAN: How many times in the past year have you    Used an illegal drug or used a prescription medication for non-medical reasons?  Never Filed at: 03/09/2022 2987                    MDM    Disposition  Final diagnoses:   Facial numbness   Chest pain   Headache     Time reflects when diagnosis was documented in both MDM as applicable and the Disposition within this note     Time User Action Codes Description Comment    3/9/2022  8:14 AM Reida Eric Add [R20 0] Facial numbness     3/9/2022  8:34 AM Reida Eric Add [R07 9] Chest pain     3/9/2022  8:35 AM Reida Eric Add [R51 9] Headache     3/9/2022  2:42 PM Pat Purple Add [I63 9] CVA (cerebral vascular accident) [facility])       ED Disposition     ED Disposition Condition Date/Time Comment    Discharge  Wed Mar 9, 2022  2:53 PM Case was discussed with Dr Maile Pizano and the patient's admission status was agreed to be Admission Status: inpatient status to the service of Dr Kassie Matute   Follow-up Information     Follow up With Specialties Details Why Contact Info Additional [address] Neurology [facility] Neurology Call in 1 week(s) Call if clinic does not schedule appt within 1 week [facility], [address], [place], [place], [address] [facility]    Shruthi Rodas MD Family Medicine Follow up Please call for a follow-up appointment within 1 week  [address] [facility] [address]  [address] [address] [phone]             Discharge Medication List as of 3/9/2022  2:49 PM      START taking these medications    Details   aspirin 81 mg chewable tablet Chew 1 tablet (81 mg total) daily, Starting Thu 3/10/2022, Normal      atorvastatin (LIPITOR) 40 mg tablet Take 1 tablet (40 mg total) by mouth every evening, Starting Wed 3/9/2022, Normal         CONTINUE these medications which have NOT CHANGED    Details   azelastine (ASTELIN) 0 1 % nasal spray 1 spray into each nostril 2 (two) times a day, Starting Mon 11/15/2021, Normal             Outpatient Discharge Orders   Ambulatory referral to Neurology   Standing Status: Future Standing Exp   Date: 03/09/23      Activity as tolerated     Call provider for:  persistent dizziness or light-headedness     Call provider for:  difficulty breathing, headache or visual disturbances       PDMP Review     None          ED Provider  Electronically Signed by           Danyell Davies MD  03/09/22 Maru Sales MD  03/09/22 5500

## 2022-03-09 NOTE — DISCHARGE SUMMARY
Abiola U  65   Discharge- Nicole Barrier 1975, 51 y o  male MRN: [id number]  Unit/Bed#: ED 20 Encounter: [id number]  Primary Care Provider: Kristina Rich MD   Date and time admitted to hospital: 3/9/2022  5:37 AM    * CVA (cerebral vascular accident) [facility])  Assessment & Plan  · Patient with left facial numbness, left arm numbness and HA that started this morning  · CTA head/neck:   No acute intracranial abnormality  Negative CTA head and neck for large vessel occlusion, dissection, aneurysm, or high-grade stenosis  Additional chronic/incidental findings as detailed above  · Stroke pathway on telemetry with neuro checks  · MRI brain:   No acute intracranial abnormality  Mild chronic microangiopathy  · Echocardiogram- pending read on discharge- outpatient follow-up with PCP or cardiology regarding results  · Lipid panel, hgb A1c- pending   · Started Lipitor 40 mg po daily, aspirin 81 mg po daily  Patient given Plavix 300 mg po x 1 dose prior to discharge  Patient discharged home on Lipitor 40 mg daily, DAPT with aspirin 81 mg and Plavix 75 mg daily x 3 weeks then aspirin 81 mg daily only thereafter  · Neurology consultation appreciated despite negative MRI neurology felt this was still a CVA  Outpatient follow-up with Neurology    Chest pain  Assessment & Plan  · Patient with history of MI  · Patient with left sided chest pain and left arm pain since 3/7/2022  · HS troponin I at 0hr: 3, at 2 hr: 3 with delta of 0, 4 hr: 3 with deta of 0  · EKG: NSR with HR 67, non specific t wave changes  · Echocardiogram in the setting of stroke workup- read pending  · Lipitor 40 mg po daily and Plavix/aspirin 81 mg po daily initiated in the setting of stroke  · CXR: No acute cardiopulmonary disease    · Outpatient follow-up with cardiology      Medical Problems             Resolved Problems  Date Reviewed: 3/9/2022    None              Discharging Physician / Practitioner: Mary Beth Bourgeois MALINI  PCP: Andrea Miller MD  Admission Date:   Admission Orders (From admission, onward)     Ordered        03/09/22 1041  Place in Observation  Once            03/09/22 0834  INPATIENT ADMISSION  Once,   Status:  Canceled                      Discharge Date: 03/09/22    Consultations During Hospital Stay:  · Neurology    Procedures Performed:   · none    Significant Findings / Test Results:   CTA head and neck with and without contrast    Result Date: 3/9/2022  Impression: No acute intracranial abnormality  Negative CTA head and neck for large vessel occlusion, dissection, aneurysm, or high-grade stenosis  Additional chronic/incidental findings as detailed above  Workstation performed: [id number]     X-ray chest 1 view portable    Result Date: 3/9/2022  Impression: No acute cardiopulmonary disease  Workstation performed: [id number]     MRI brain wo contrast    Result Date: 3/9/2022  · Impression: No acute intracranial abnormality  Mild chronic microangiopathy  Workstation performed: [id number]   ·     Incidental Findings:   · none     Test Results Pending at Discharge (will require follow up): · Echocardiogarm, lipid panel, hgb A1C- outpatient follow-up with PCP regarding results     Outpatient Tests Requested:  · none    Complications:  none    Reason for Admission: CVA    Hospital Course:   Godfrey Chaves is a 51 y o  male patient who originally presented to the hospital on 3/9/2022 due to left chest pain, left arm pain, left facial numbness, headache  The patient has a PMH of MI, tobacco use who presents with a complaint of left chest pain, left arm pain, left facial numbness, and headache  The patient states his chest pain and left arm pain started on Monday 3/7/2022  He describes it as a pressure and rates it a 4/10  He states he continues to have this pain  The patient states he woke yesterday morning with a headache and left facial numbness   He admits to photophobia and states he had difficulty focusing when attempting to read  He states his blood pressure was elevated during that time at 190's/80's  The patient's wife at the bedside felt the patient had a left sided facial droop this morning  He rates his headache a 5/10 and states it's in the front of his head  ED attending spoke to Neurology who recommending admission for stroke workup  The patient denies any shortness of breath, abdominal pain, nausea/vomiting, or diarrhea  He denies fevers/chills         Please see above list of diagnoses and related plan for additional information  Condition at Discharge: good    Discharge Day Visit / Exam:   Subjective:    Vitals: Blood Pressure: 119/81 (03/09/22 1230)  Pulse: 79 (03/09/22 1330)  Temperature: (!) 96 8 °F (36 °C) (03/09/22 1030)  Temp Source: Tympanic (03/09/22 0546)  Respirations: 17 (03/09/22 1330)  Height: 5' 10" (177 8 cm) (03/09/22 1100)  Weight - Scale: 95 2 kg (209 lb 14 1 oz) (03/09/22 1100)  SpO2: 94 % (03/09/22 1330)  Exam:   Physical Exam: please see physical exam for note earlier today  Discussion with Family: Updated  (wife) at bedside  Discharge instructions/Information to patient and family:   See after visit summary for information provided to patient and family  Provisions for Follow-Up Care:  See after visit summary for information related to follow-up care and any pertinent home health orders  Disposition:   Home    Planned Readmission: no     Discharge Statement:  I spent 25 minutes discharging the patient  This time was spent on the day of discharge  I had direct contact with the patient on the day of discharge  Greater than 50% of the total time was spent examining patient, answering all patient questions, arranging and discussing plan of care with patient as well as directly providing post-discharge instructions  Additional time then spent on discharge activities      Discharge Medications:  See after visit summary for reconciled discharge medications provided to patient and/or family        **Please Note: This note may have been constructed using a voice recognition system**

## 2022-03-09 NOTE — PHYSICAL THERAPY NOTE
Physical Therapy Evaluation     Patient's Name: Mike Norris    Admitting Diagnosis  Chest pain [R07 9]    Problem List  Patient Active Problem List   Diagnosis    Family history of ischemic heart disease (IHD)    Dyslipidemia    Exertional dyspnea    Chest pain    Stroke-like symptoms       Past Medical History  Past Medical History:   Diagnosis Date    COPD [facility]chronic obstructive pulmonary disease) ([facility] )     DVT of lower limb, acute ([facility] )     MI (myocardial infarction) ([facility] ) 2007       Past Surgical History  Past Surgical History:   Procedure Laterality Date    KNEE SURGERY  2010 03/09/22 1224   PT Last Visit   PT Visit Date 03/09/22   Note Type   Note type Evaluation   Pain Assessment   Pain Assessment Tool 0-10   Pain Score 6   Pain Location/Orientation Orientation: Left; Location: Chest   Pain Radiating Towards yes, LUE->hand   Pain Onset/Description Onset: Ongoing;Frequency: Constant/Continuous; Descriptor: Discomfort; Descriptor: Dull   Effect of Pain on Daily Activities yes   Patient's Stated Pain Goal No pain   Hospital Pain Intervention(s) Medication (See MAR); Repositioned; Ambulation/increased activity; Emotional support   Multiple Pain Sites Yes   Pain 2   Pain Score 2 5   Pain Location/Orientation 2 Location: Head  (headache)   Pain Onset/Description 2 Onset: Ongoing; Descriptor: Aching   Patient's Stated Pain Goal 2 No pain   Hospital Pain Intervention(s) 2 Emotional support   Restrictions/Precautions   Weight Bearing Precautions Per Order No   Other Precautions Fall Risk;Multiple lines;Telemetry;Pain   Home Living   Type of Home House  (ranch)   Home Layout One level;Performs ADLs on one level; Able to live on main level with bedroom/bathroom;Stairs to enter with rails  (side entrance/landing x 10 USAMA)   Bathroom Shower/Tub Walk-in shower   Bathroom Toilet Standard   Bathroom Equipment   (no prior DME usage)   [address]   (no prior AD usage or availability)   Prior Function   Level of [place] Independent with ADLs and functional mobility   Lives With Spouse   ADL Assistance Independent   IADLs Independent   Falls in the last 6 months 0  (x 1, "lost my footing")   Vocational Full time employment   General   Family/Caregiver Present Yes  (spouse present throughout assessment)   Cognition   Overall Cognitive Status WFL   Arousal/Participation Alert   Orientation Level Oriented X4   Memory Within functional limits   Following Commands Follows all commands and directions without difficulty   Comments Pt  agreeable to PT assessment, cooperative  LUE Assessment   LUE Assessment X  (LUE shoulder into hand numbness, pinky)   RLE Assessment   RLE Assessment WFL   LLE Assessment   LLE Assessment WFL   Vision-Basic Assessment   Current Vision No visual deficits   Vestibular   Spontaneous Nystagmus (-) no evidence of nystagmus at rest in room light   Coordination   Movements are Fluid and Coordinated 1   Proprioception   RLE Proprioception Grossly intact   LLE Proprioception Grossly Intact   Bed Mobility   Supine to Sit 5  Supervision   Additional items Assist x 1;Verbal cues   Sit to Supine 5  Supervision   Additional items Assist x 1;Verbal cues   Transfers   Sit to Stand 5  Supervision   Additional items Assist x 1;Verbal cues   Stand to Sit 5  Supervision   Additional items Assist x 1;Verbal cues   Stand pivot 5  Supervision   Additional items Assist x 1;Verbal cues   Additional Comments Verbal cues for proper body mechanics,safety awareness  Ambulation/Elevation   Gait pattern Narrow MELIZA; Short stride   Gait Assistance 5  Supervision   Additional items Assist x 1   Assistive Device None   Distance 10 feet x 2   Stair Management Assistance Not tested   Balance   Static Sitting Normal   Dynamic Sitting Normal   Static Standing Good   Dynamic Standing Fair +   Ambulatory Fair +   Endurance Deficit   Endurance Deficit Yes   Activity Tolerance   Activity Tolerance Patient limited by pain   Nurse Made Aware yes, Pancho Kim RN   Assessment   Prognosis Good   Problem List Decreased endurance;Pain;Decreased mobility; Impaired balance   Assessment Pt is 51 y o  male seen for PT evaluation s/p admit to [facility] on 3/9/2022 w/ Stroke-like symptoms  PT consulted to assess pt's functional mobility and d/c needs  Order placed for PT eval and tx order  Comorbidities affecting pt's physical performance at time of assessment include: stroke-like symptoms, chest pain,exertional dyspnea, potential for falls  PTA, pt was independent w/ all functional mobility w/ o AD usage  Personal factors affecting pt at time of IE include: inability to navigate community distances, positive fall history and inability to perform IADLs  Please find objective findings from PT assessment regarding body systems outlined above with impairments and limitations including impaired balance, decreased endurance, gait deviations, pain, decreased activity tolerance and fall risk  From PT/mobility standpoint, recommendation at time of d/c would be no rehabilitation needs pending progress in order to facilitate return to PLOF  Goals   Patient Goals to have less pain  Lizet Joya RN aware of persistent headache)   LTG Expiration Date 03/19/22   Long Term Goal #1 Patient will complete transfers modified I  to decrease risk of falls, facilitate upright standing posture  Patient will exhibit increase dynamic standing balance to Good 5-7 minutes without LOB independently  to improve endurance  Patient will exhibit increase dynamic ambulatory balance to Good 350-500 feet w/o AD independently to improve ability to mobilize to toilet, chair and decrease risk for additional medical complications  PT Treatment Day 0   Plan   Treatment/Interventions Functional transfer training; Endurance training;Gait training;Spoke to nursing   PT Frequency 1-2x/wk   Recommendation   PT Discharge Recommendation No rehabilitation needs   Additional Comments Upon conclusion, pt  was resting in bed w/Nica RN at bedside  Additional Comments 2 Pt's raw score on the AM-[facility] Basic Mobility inpatient short form is 20, standardized score is 43 99  Patients at this level are likely to benefit from DC to home  However, please refer to therapist recommendation for safe DC planning     AM-[facility] Basic Mobility Inpatient   Turning in Bed Without Bedrails 4   Lying on Back to Sitting on Edge of Flat Bed 4   Moving Bed to Chair 3   Standing Up From Chair 3   Walk in Room 3   Climb 3-5 Stairs 3   Basic Mobility Inpatient Raw Score 20   Basic Mobility Standardized Score 43 99   Highest Level Of Mobility   [facility] Goal 6: Walk 10 steps or more     History/Personal Factors/Comorbidities: stroke-like symptoms, chest pain,exertional dyspnea, potential for falls    # of body structures/limitations: activity intolerance,decreased endurance, impaired balance, gait deviations,pain    Clinical presentation: unstable as seen in pain severity in more than one body region, progressive symptoms prior hospitalization, high fall risk      Curly Duverney, PT

## 2022-03-09 NOTE — ASSESSMENT & PLAN NOTE
· Patient with left facial numbness, left arm numbness and HA that started this morning  · CTA head/neck:   No acute intracranial abnormality  Negative CTA head and neck for large vessel occlusion, dissection, aneurysm, or high-grade stenosis  Additional chronic/incidental findings as detailed above    · Stroke pathway on telemetry with neuro checks  · Check MRI brain  · Echocardiogram  · Check lipid panel, hgb A1c  · Start Lipitor 40 mg po daily, aspirin 81 mg po daily  · PT/OT  · Neurology consult

## 2022-03-09 NOTE — TELEMEDICINE
TeleConsultation - Neurology   Efrem Hu 51 y o  male MRN: [id number]  Unit/Bed#: ED 20 Encounter: [id number]     REQUIRED DOCUMENTATION:     1  This service was provided via Telemedicine  2  Provider located at [facility]  3  TeleMed provider: Surekha Waller DO   4  Identify all parties in room with patient during tele consult:  [facility] (wife)  5  Patient was then informed that this was a Telemedicine visit and that the exam was being conducted confidentially over secure lines  My office door was closed  No one else was in the room  Patient acknowledged consent and understanding of privacy and security of the Telemedicine visit, and gave us permission to have the assistant stay in the room in order to assist with the history and to conduct the exam   I informed the patient that I have reviewed their record in Epic and presented the opportunity for them to ask any questions regarding the visit today  The patient agreed to participate  Assessment/Plan   L facial and arm numbness with potential subtle weakness in the setting of chest pain/headache  Had prior cardiac disease/MI but not on medications at home per pt  Given vascular risk factors and persistent symptoms >24 hrs, high suspicion for minor stroke at this time     -continue neurochecks; notify with changes  -HCT reviewed - unremarkable  -CTA reviewed - unremarkable  -obtain MRI brain w/o contrast  -check TTE  -telemetry  -check lipid panel/A1c  -goal of normotension at this time  -would load with 300mg Plavix then continue ASA 81mg daily and Plavix 75mg daily x 3 weeks followed by ASA monotherapy  -initiate Atorvastatin  -follow with stroke clinic as outpatient  -will follow results; call with questions    Efrem Hu will need follow up in in 6 weeks with neurovascular attending or advance practitioner  He will not require outpatient neurological testing        Reason for Consult / Principal Problem: stroke  Hx and PE limited by: N/A    HPI: Ingrid Anders is a 51 y o  male with COPD and CAD who presents with onset of chest pain, headache, and L sided numbness/weakness  Pt began having substernal chest pain about 2 days ago along with a throbbing headache and left arm numbness  This continued through yesterday and he awoke this morning at 4:30am to find that his left face also was feeling numb now  With his continued chest pain/headache he decided to come to the ED for evaluation with concern for heart disease  He received a CTA head/neck in the ED that was unremarkable for evidence of acute ischemia or any significant vascular disease  He was noted to have subtle weakness by the ED in his LUE but he denies having any particular difficulty with strength  He states that he has had numbness in the L arm before associated with chest pain but not so much related to headache and he does not really get headaches  He feels his symptoms are persistent and have not really resolved  He does not take any medications at home  Consult to neurology  Consult performed by: Addy Briseno DO  Consult ordered by: Odalis Terry PA-C           Review of Systems   Respiratory: Positive for shortness of breath  Cardiovascular: Positive for chest pain  Neurological: Positive for weakness, numbness and headaches  All other systems reviewed and are negative        Historical Information   Past Medical History:   Diagnosis Date    [facility]     [facility] acute [facility]     [facility]     Past Surgical History:   Procedure Laterality Date    KNEE SURGERY  2010     Social History   Social History     Substance and Sexual Activity   Alcohol Use Not Currently     Social History     Substance and Sexual Activity   Drug Use Not Currently     E-Cigarette/Vaping    E-Cigarette Use Never User      E-Cigarette/Vaping Substances    Nicotine No     THC No     CBD No     Flavoring No     Other No     Unknown No      Social History     Tobacco Use   Smoking Status Former Smoker    Packs/day: 0 50    Years: 24 00    Pack years: 12 00    Types: Cigarettes    Quit date: 2018    Years since quittin 1   Smokeless Tobacco Current User   Tobacco Comment    "here and there a cigerette"     Family History:   Family History   Problem Relation Age of Onset    Hypertension Mother     Heart attack Father 43            Heart failure Paternal Aunt         pacemaker    Heart disease Paternal Uncle     Heart failure Paternal Grandmother         pacemaker    Heart attack Paternal Grandfather         cabg    Heart disease Paternal Grandfather 58        heart transplant       Review of previous medical records was completed  Meds/Allergies   all current active meds have been reviewed, current meds:   Current Facility-Administered Medications   Medication Dose Route Frequency    sodium chloride (PF) 0 9 % injection 3 mL  3 mL Intravenous Q1H PRN    and PTA meds:   Prior to Admission Medications   Prescriptions Last Dose Informant Patient Reported? Taking?   azelastine (ASTELIN) 0 1 % nasal spray   No No   Si spray into each nostril 2 (two) times a day      Facility-Administered Medications: None       No Known Allergies    Objective   Vitals:Blood pressure 142/88, pulse 66, temperature (!) 97 1 °F (36 2 °C), temperature source Tympanic, resp  rate 17, weight 95 2 kg (209 lb 14 1 oz), SpO2 97 %  ,Body mass index is 30 11 kg/m²  Intake/Output Summary (Last 24 hours) at 3/9/2022 0846  Last data filed at 3/9/2022 0809  Gross per 24 hour   Intake 2000 ml   Output --   Net 2000 ml       Invasive Devices: Invasive Devices  Report    Peripheral Intravenous Line            Peripheral IV 22 Right Forearm <1 day                Physical Exam  Constitutional:       Appearance: He is well-developed  HENT:      Head: Normocephalic and atraumatic     Eyes:      Extraocular Movements: EOM normal  Conjunctiva/sclera: Conjunctivae normal    Pulmonary:      Effort: No respiratory distress  Abdominal:      General: There is no distension  Musculoskeletal:         General: No swelling  Cervical back: No rigidity  Skin:     Coloration: Skin is not jaundiced  Neurological:      Mental Status: He is alert and oriented to person, place, and time  Coordination: Finger-Nose-Finger Test normal       Deep Tendon Reflexes: Strength normal    Psychiatric:         Speech: Speech normal        Neurologic Exam     Mental Status   Oriented to person, place, and time  Attention: normal  Concentration: normal    Speech: speech is normal   Level of consciousness: alert  Knowledge: good  Able to name object  Able to repeat  Normal comprehension  Cranial Nerves     CN III, IV, VI   Extraocular motions are normal      CN VII   Facial expression full, symmetric  CN VIII   Hearing: intact    CN XII   Tongue deviation: none  Reports diminished sensation to LT along L face     Motor Exam     Strength   Strength 5/5 throughout  Sensory Exam   Reports diminished sensation to LUE     Gait, Coordination, and Reflexes     Coordination   Finger to nose coordination: normal      Lab Results:   CBC:   Results from last 7 days   Lab Units 03/09/22  0613   WBC Thousand/uL 7 99   RBC Million/uL 4 75   HEMOGLOBIN g/dL 15 2   HEMATOCRIT % 43 6   MCV fL 92   PLATELETS Thousands/uL 276   , BMP/CMP:   Results from last 7 days   Lab Units 03/09/22  0613   SODIUM mmol/L 137   POTASSIUM mmol/L 3 8   CHLORIDE mmol/L 104   CO2 mmol/L 26   BUN mg/dL 11   CREATININE mg/dL 1 14   CALCIUM mg/dL 8 7   AST U/L 15   ALT U/L 26   ALK PHOS U/L 112*   EGFR ml/min/1 73sq m 76   , HgBA1C:   , Coagulation:   , Lipid Profile:     Imaging Studies: I have personally reviewed pertinent films in PACS  EKG, Pathology, and Other Studies: I have personally reviewed pertinent reports      VTE Prophylaxis: Sequential compression device Aysha Cardenas

## 2022-03-09 NOTE — ED PROCEDURE NOTE
PROCEDURE  ECG 12 Lead Documentation Only    Date/Time: 3/9/2022 5:50 AM  Performed by: Khushboo Lance MD  Authorized by: Khushboo Lance MD     Indications / Diagnosis:  Cp   ECG reviewed by me, the ED Provider: yes    Patient location:  ED  Previous ECG:     Comparison to cardiac monitor: Yes    Interpretation:     Interpretation: normal    Rate:     ECG rate:  67    ECG rate assessment: normal    Rhythm:     Rhythm: sinus rhythm    Ectopy:     Ectopy: none    QRS:     QRS axis:  Normal    QRS intervals:  Normal  Conduction:     Conduction: normal    ST segments:     ST segments:  Non-specific  T waves:     T waves: non-specific           Khushboo Lance MD  03/09/22 2644

## 2022-03-09 NOTE — ASSESSMENT & PLAN NOTE
· Patient with history of MI  · Patient with left sided chest pain and left arm pain since 3/7/2022  · HS troponin I at 0hr: 3, at 2 hr: 3 with delta of 0, 4 hr pending  · EKG: NSR with HR 67, non specific t wave changes  · Echocardiogram in the setting of stroke workup  · Lipitor 40 mg po daily and aspirin 81 mg po daily initiated in the setting of stroke workup  · CXR: No acute cardiopulmonary disease

## 2022-03-11 ENCOUNTER — TELEPHONE (OUTPATIENT)
Dept: NEUROLOGY | Facility: CLINIC | Age: 47
End: 2022-03-11

## 2022-03-11 LAB
ATRIAL RATE: 60 BPM
ATRIAL RATE: 65 BPM
ATRIAL RATE: 67 BPM
P AXIS: 55 DEGREES
P AXIS: 57 DEGREES
P AXIS: 68 DEGREES
PR INTERVAL: 150 MS
PR INTERVAL: 152 MS
PR INTERVAL: 156 MS
QRS AXIS: 51 DEGREES
QRS AXIS: 67 DEGREES
QRS AXIS: 69 DEGREES
QRSD INTERVAL: 88 MS
QRSD INTERVAL: 90 MS
QRSD INTERVAL: 92 MS
QT INTERVAL: 384 MS
QT INTERVAL: 400 MS
QT INTERVAL: 406 MS
QTC INTERVAL: 400 MS
QTC INTERVAL: 405 MS
QTC INTERVAL: 422 MS
T WAVE AXIS: 21 DEGREES
T WAVE AXIS: 32 DEGREES
T WAVE AXIS: 48 DEGREES
VENTRICULAR RATE: 60 BPM
VENTRICULAR RATE: 65 BPM
VENTRICULAR RATE: 67 BPM

## 2022-03-11 PROCEDURE — 93010 ELECTROCARDIOGRAM REPORT: CPT | Performed by: INTERNAL MEDICINE

## 2022-03-11 NOTE — TELEPHONE ENCOUNTER
Pt sched HFU appt with Peña Pineda on 4/20/22 at 9:00 am        HFU/SLCA/ATT/AP/L facial and arm numbness/DC3/9    Note from Chart:     Lynne Ramirez will need follow up in in 6 weeks with neurovascular attending or advance practitioner  He will not require outpatient neurological testing

## 2022-03-14 NOTE — TELEPHONE ENCOUNTER
Spoke with pt to inform that he is sched HFU appt with Radha Amaral on 4/20/22 at 9:00 am  Pt stated that he cannot travel to [address] office because is too far, he also is requesting to be seen ASAP because he is still getting the headaches very bad, I sched appt with Tiburcio Beth on 3/23/22 at 3:45 pm

## 2022-03-21 ENCOUNTER — HOSPITAL ENCOUNTER (OUTPATIENT)
Dept: CT IMAGING | Facility: HOSPITAL | Age: 47
Discharge: HOME | End: 2022-03-21

## 2022-03-21 DIAGNOSIS — Z82.49 FAMILY HISTORY OF ISCHEMIC HEART DISEASE (IHD): ICD-10-CM

## 2022-03-21 DIAGNOSIS — E78.5 DYSLIPIDEMIA: ICD-10-CM

## 2022-03-21 PROCEDURE — 75571 CT HRT W/O DYE W/CA TEST: CPT

## 2022-03-21 PROCEDURE — G1004 CDSM NDSC: HCPCS

## 2022-03-23 ENCOUNTER — OFFICE VISIT (OUTPATIENT)
Dept: NEUROLOGY | Facility: CLINIC | Age: 47
End: 2022-03-23
Payer: COMMERCIAL

## 2022-03-23 VITALS
SYSTOLIC BLOOD PRESSURE: 149 MMHG | RESPIRATION RATE: 16 BRPM | BODY MASS INDEX: 32.73 KG/M2 | TEMPERATURE: 97.3 F | WEIGHT: 221 LBS | DIASTOLIC BLOOD PRESSURE: 88 MMHG | HEIGHT: 69 IN | HEART RATE: 72 BPM

## 2022-03-23 DIAGNOSIS — G45.9 TIA (TRANSIENT ISCHEMIC ATTACK): Primary | ICD-10-CM

## 2022-03-23 DIAGNOSIS — R20.2 PARESTHESIA: ICD-10-CM

## 2022-03-23 DIAGNOSIS — R51.9 PERSISTENT HEADACHES: ICD-10-CM

## 2022-03-23 PROCEDURE — 99213 OFFICE O/P EST LOW 20 MIN: CPT | Performed by: NURSE PRACTITIONER

## 2022-03-23 RX ORDER — DIVALPROEX SODIUM 250 MG/1
TABLET, EXTENDED RELEASE ORAL
Qty: 8 TABLET | Refills: 0 | Status: SHIPPED | OUTPATIENT
Start: 2022-03-23 | End: 2022-03-28 | Stop reason: ALTCHOICE

## 2022-03-23 RX ORDER — SODIUM PICOSULFATE, MAGNESIUM OXIDE, AND ANHYDROUS CITRIC ACID 10; 3.5; 12 MG/160ML; G/160ML; G/160ML
LIQUID ORAL
COMMUNITY
Start: 2022-03-04

## 2022-03-23 NOTE — PATIENT INSTRUCTIONS
Continue aspirin daily  Suggest you take the lipitor (atorvastatin) at this time, and continue regular follow up with primary care to monitor lipid panel  Depakote to break headache cycle; try to reduce daily advil use- I will call you by Monday to see how this does    Let us know if any new symptoms  If there is headache with weakness, speech change, balance problem you should go back to ED  Follow up in 6 weeks

## 2022-03-23 NOTE — PROGRESS NOTES
Patient ID: Nela Stephen is a 49 y o  male  Assessment/Plan:  Patient Instructions:  Continue aspirin daily  Suggest you take the lipitor (atorvastatin) at this time, and continue regular follow up with primary care to monitor lipid panel  Depakote to break headache cycle; try to reduce daily advil use- I will call you by Monday to see how this does  Let us know if any new symptoms  If there is headache with weakness, speech change, balance problem you should go back to ED  Follow up in 6 weeks       Diagnoses and all orders for this visit:    TIA (transient ischemic attack)  Comments:  mri/cta normal  continue aspirin/statin  Orders:  -     Ambulatory referral to Neurology    Persistent headaches  Comments:  ? post traumatic with some migrainous features  Orders:  -     divalproex sodium (DEPAKOTE ER) 250 mg 24 hr tablet; 2 tablets at bedtime for 3 days then 1 tablet at bedtime for 2 days    Paresthesia  Comments:  left hand            Subjective:    MARLY  Mikel Thomas is a 49year old male with past medical history of nstemi, hyperlipidemia, copd,  camron with ahi of 23 2 but positional so told no need for pap at this time, recent fall 2022 down steps with continued left elbow pain who presents today for hospital follow up visit  He has a strong family history of heart disease; his father  at 43years old of MI  He was in ED 3/9/2022 for chest pain, headache and left arm/lower face numbness  He was seen by teleneurology and because of vascular risk factors he was encouraged to start on antiplatelet therapy  He has been taking aspirin every day since but did not start lipitor-states awaiting CT coronary calcium score first  His main complaint today is that his headache is still not gone  He states a constant headache that is bitemporal/bifrontal since 3/7/2022  He states advil can bring the pain down to a 4-5/10 but then it goes back again to more severe pain   He states using 2 advil every 4 hours  He denies nausea/vomiting; but does have photophobia, poor concentration, and blurred vision with the headache  He denies significant dizziness; no continued chest pain  He does state continued 3rd-5th digit weakness/numbness on the left  He states he has tried percocet and tylenol 3 for the pain without relief  He is able to sleep  He states he has lost weight recently  He works for [employer]; states he has had to stay in office setting  CTA 3/9/2022:  IMPRESSION:  No acute intracranial abnormality  Negative CTA head and neck for large vessel occlusion, dissection, aneurysm, or high-grade stenosis  MRI brain 3/9/2022:  IMPRESSION:  No acute intracranial abnormality  Mild chronic microangiopathy  The following portions of the patient's history were reviewed and updated as appropriate: allergies, current medications, past family history, past medical history, past social history, past surgical history and problem list          Objective:    Blood pressure 149/88, pulse 72, temperature (!) 97 3 °F (36 3 °C), temperature source Temporal, resp  rate 16, height 5' 9" (1 753 m), weight 100 kg (221 lb)  Physical Exam  Vitals reviewed  Constitutional:       General: He is not in acute distress  HENT:      Head: Normocephalic  Right Ear: External ear normal       Left Ear: External ear normal       Mouth/Throat:      Pharynx: Oropharynx is clear  Eyes:      Extraocular Movements: Extraocular movements intact  Pupils: Pupils are equal, round, and reactive to light  Cardiovascular:      Rate and Rhythm: Normal rate  Pulmonary:      Effort: Pulmonary effort is normal       Comments: Unable to auscultate, wearing chest plate  Abdominal:      General: There is no distension  Musculoskeletal:         General: Normal range of motion  Skin:     Findings: No rash  Neurological:      Mental Status: He is alert  Sensory: Sensory deficit present  Motor: Weakness present  Coordination: Romberg sign negative  Deep Tendon Reflexes: Reflexes are normal and symmetric  Psychiatric:         Mood and Affect: Mood normal          Speech: Speech normal          Neurological Exam  Mental Status  Alert  Oriented to person, place and time  Speech is normal  Language is fluent with no aphasia  Cranial Nerves  CN II: Right visual acuity: counts fingers  Left visual acuity: counts fingers  CN III, IV, VI: Extraocular movements intact bilaterally  Pupils equal round and reactive to light bilaterally  CN V: Facial sensation is normal   CN VII: Full and symmetric facial movement  CN VIII: Hearing is normal   CN IX, X: Palate elevates symmetrically  CN XI: Shoulder shrug strength is normal   CN XII: Tongue midline without atrophy or fasciculations  Motor   Strength is 5/5 in all four extremities except as noted  Mild left hand grasp weakness  Sensory  Decreased sensation left hand 4th/5th digits   Reflexes  Deep tendon reflexes are 2+ and symmetric in all four extremities with downgoing toes bilaterally  Coordination  Right: Finger-to-nose normal   Left: Finger-to-nose normal     Gait  Casual gait is normal including stance, stride, and arm swing  Romberg is absent  Able to rise from chair without using arms  ROS:    Review of Systems   Constitutional: Negative  Negative for appetite change and fever  HENT: Negative  Negative for hearing loss, tinnitus, trouble swallowing and voice change  Eyes: Positive for photophobia  Negative for pain  Respiratory: Negative  Negative for shortness of breath  Cardiovascular: Negative  Negative for palpitations  Gastrointestinal: Negative  Negative for nausea and vomiting  Endocrine: Negative  Negative for cold intolerance  Genitourinary: Negative  Negative for dysuria, frequency and urgency  Musculoskeletal: Negative  Negative for myalgias and neck pain  Skin: Negative  Negative for rash     Neurological: Positive for headaches (Constant)  Negative for dizziness, tremors, seizures, syncope, facial asymmetry, speech difficulty, weakness, light-headedness and numbness  Hematological: Negative  Does not bruise/bleed easily  Psychiatric/Behavioral: Negative  Negative for confusion, hallucinations and sleep disturbance     ROS was reviewed and updated as appropriate

## 2022-03-28 ENCOUNTER — TELEPHONE (OUTPATIENT)
Dept: NEUROLOGY | Facility: CLINIC | Age: 47
End: 2022-03-28

## 2022-03-28 DIAGNOSIS — G44.86 CERVICOGENIC HEADACHE: Primary | ICD-10-CM

## 2022-03-28 DIAGNOSIS — R20.2 ARM PARESTHESIA, LEFT: ICD-10-CM

## 2022-03-28 RX ORDER — GABAPENTIN 300 MG/1
300 CAPSULE ORAL
Qty: 30 CAPSULE | Refills: 2 | Status: SHIPPED | OUTPATIENT
Start: 2022-03-28 | End: 2022-05-05 | Stop reason: ALTCHOICE

## 2022-03-28 NOTE — TELEPHONE ENCOUNTER
I called patient to follow up  He states depakote did not relieve headache fully; he did have to take dose of advil today, but states is using this less frequently  He has noticed more base of head/neck pain on right side; his left hand paresthesias are the same  At this time I suggest MRI C spine and start magnesium oxide 400mg daily and gabapentin 300mg nightly to see if this improves symptoms  Orders placed

## 2022-04-21 ENCOUNTER — TELEPHONE (OUTPATIENT)
Dept: NEUROLOGY | Facility: CLINIC | Age: 47
End: 2022-04-21

## 2022-04-21 NOTE — TELEPHONE ENCOUNTER
Patient called regarding MRI  States he was told to cancel study since authorization was not obtained  Please submit auth for MRI ASAP  Thank you!!    Patient asking for a call back once auth is obtained

## 2022-04-21 NOTE — TELEPHONE ENCOUNTER
Called patient [phone] and left message to check if this is workman's comp related and which workman's comp case this is related to   Also sent message to see who would co-sign the notes to move forward with auth process

## 2022-04-25 ENCOUNTER — TELEPHONE (OUTPATIENT)
Dept: NEUROLOGY | Facility: CLINIC | Age: 47
End: 2022-04-25

## 2022-04-25 NOTE — TELEPHONE ENCOUNTER
Called and left a voicemail for patient - Please call back to confirm upcoming appointment with [facility]  Provided patient with apt date, time and location  Informed patient that check in is at least 15 minutes prior to apt time

## 2022-05-04 ENCOUNTER — HOSPITAL ENCOUNTER (OUTPATIENT)
Dept: MRI IMAGING | Facility: HOSPITAL | Age: 47
Discharge: HOME | End: 2022-05-04
Payer: COMMERCIAL

## 2022-05-04 DIAGNOSIS — G44.86 CERVICOGENIC HEADACHE: ICD-10-CM

## 2022-05-04 DIAGNOSIS — R20.2 ARM PARESTHESIA, LEFT: ICD-10-CM

## 2022-05-04 PROCEDURE — 72141 MRI NECK SPINE W/O DYE: CPT

## 2022-05-04 PROCEDURE — G1004 CDSM NDSC: HCPCS

## 2022-05-05 ENCOUNTER — OFFICE VISIT (OUTPATIENT)
Dept: NEUROLOGY | Facility: CLINIC | Age: 47
End: 2022-05-05
Payer: COMMERCIAL

## 2022-05-05 VITALS
RESPIRATION RATE: 14 BRPM | HEART RATE: 72 BPM | WEIGHT: 209 LBS | DIASTOLIC BLOOD PRESSURE: 93 MMHG | TEMPERATURE: 97.6 F | BODY MASS INDEX: 30.96 KG/M2 | HEIGHT: 69 IN | SYSTOLIC BLOOD PRESSURE: 144 MMHG

## 2022-05-05 DIAGNOSIS — G44.229 CHRONIC TENSION HEADACHE: Primary | ICD-10-CM

## 2022-05-05 DIAGNOSIS — G45.9 TIA (TRANSIENT ISCHEMIC ATTACK): ICD-10-CM

## 2022-05-05 PROCEDURE — 99213 OFFICE O/P EST LOW 20 MIN: CPT | Performed by: NURSE PRACTITIONER

## 2022-05-05 RX ORDER — NORTRIPTYLINE HYDROCHLORIDE 10 MG/1
CAPSULE ORAL
Qty: 60 CAPSULE | Refills: 1 | Status: SHIPPED | OUTPATIENT
Start: 2022-05-05

## 2022-05-05 NOTE — PATIENT INSTRUCTIONS
Call Dr Heloise Cogan ([phone]) if symptoms worsen and you wish to try AutoSet PAP  Suggest continued follow up with cardiology/primary care to monitor BP/Cholesterol; from my point of view I would suggest continuing aspirin/lipitor  For chronic headaches that continue despite prior medication trials I would suggest following up with sleep specialist and trial of nortriptyline nightly  Call me in 2 weeks to see how you are doing and follow up in 3 months

## 2022-05-05 NOTE — PROGRESS NOTES
Patient ID: Mary Ellen Phan is a 49 y o  male  Assessment/Plan:  Patient Instructions:  Call Dr Anh Celeste ([phone]) if symptoms worsen and you wish to try AutoSet PAP  Suggest continued follow up with cardiology/primary care to monitor BP/Cholesterol; from my point of view I would suggest continuing aspirin/lipitor  For chronic headaches that continue despite prior medication trials I would suggest following up with sleep specialist and trial of nortriptyline nightly  Call me in 2 weeks to see how you are doing and follow up in 3 months  Diagnoses and all orders for this visit:    Chronic tension headache  -     nortriptyline (PAMELOR) 10 mg capsule; Take 1 capsule nightly for 1 week and increase to 2 capsules nightly after that  TIA (transient ischemic attack)        Subjective:    HPI  Previous History:  Chelsey Loyola is a 49year old male with past medical history of nstemi, hyperlipidemia, copd,  camron with ahi of 23 2 but positional so told no need for pap at this time, recent fall 2022 down steps with continued left elbow pain who presents today for hospital follow up visit  He has a strong family history of heart disease; his father  at 43years old of MI  He was in ED 3/9/2022 for chest pain, headache and left arm/lower face numbness  He was seen by teleneurology and because of vascular risk factors he was encouraged to start on antiplatelet therapy  He has been taking aspirin every day since but did not start lipitor-states awaiting CT coronary calcium score first  His main complaint today is that his headache is still not gone  He states a constant headache that is bitemporal/bifrontal since 3/7/2022  He states advil can bring the pain down to a 4-5/10 but then it goes back again to more severe pain  He states using 2 advil every 4 hours  CTA 3/9/2022:  IMPRESSION:  No acute intracranial abnormality    Negative CTA head and neck for large vessel occlusion, dissection, aneurysm, or high-grade stenosis      MRI brain 3/9/2022:  IMPRESSION:  No acute intracranial abnormality  Mild chronic microangiopathy  CT coronary calcium score:45    MRI C spine 5/4/2022:  IMPRESSION:  Minor, noncompressive degenerative changes of the cervical spine  Craniocervical junction appears normal     Today:  Since last visit he states continued daily headache  He states depakote and gabapentin tried previously did not help (he used the gabapentin 30 days (300mg nightly) he does have some concern it may make him tired/lose focus during the day if the dose was increased)  He continues with daily advil use- decreased to 3 tablets every 6 hours  He has continued magnesium supplement  He has cut out caffeine and is hydrating well with water  He states the headache is still primarily bitemporal/bifrontal pressure sensation with occasional photophobia but no other migraine features  He wonders if his left shoulder pain has anything to do with his headaches and plans to see [facility]; his left hand tingling in 4th/5th digits has not changed, denies elbow pain- no paresthesias elsewhere  He also plans to follow up again with sleep specialist to trial APAP to see if this helps headaches  He has been able to work- works different shifts all the time  He denies any increased stressors  Overall he has been able to sleep  The following portions of the patient's history were reviewed and updated as appropriate: allergies, current medications, past family history, past medical history, past social history, past surgical history and problem list          Objective:    Blood pressure 144/93, pulse 72, temperature 97 6 °F (36 4 °C), temperature source Temporal, resp  rate 14, height 5' 9" (1 753 m), weight 94 8 kg (209 lb)  Physical Exam  Vitals reviewed  Constitutional:       General: He is not in acute distress  HENT:      Head: Normocephalic          Comments: Tenderness-mild     Right Ear: External ear normal  Left Ear: External ear normal       Mouth/Throat:      Mouth: Mucous membranes are dry  Pharynx: Oropharynx is clear  Eyes:      Pupils: Pupils are equal, round, and reactive to light  Cardiovascular:      Rate and Rhythm: Normal rate and regular rhythm  Pulses: Normal pulses  Heart sounds: Normal heart sounds  Pulmonary:      Effort: Pulmonary effort is normal       Breath sounds: Normal breath sounds  Abdominal:      General: There is no distension  Musculoskeletal:        Arms:       Cervical back: Normal range of motion  Right lower leg: No edema  Left lower leg: No edema  Comments: Tenderness  Positive neer test-pain  No significant weakenss   Neurological:      Mental Status: He is alert  Sensory: Sensory deficit present  Motor: No weakness  Coordination: Romberg sign negative  Gait: Gait normal       Deep Tendon Reflexes: Strength normal  Reflexes normal       Reflex Scores:       Brachioradialis reflexes are 2+ on the right side and 2+ on the left side  Patellar reflexes are 2+ on the right side and 2+ on the left side  Comments: Mild decreased sensation to left hand in ulnar distribution   Psychiatric:         Mood and Affect: Mood normal          Speech: Speech normal          Neurological Exam  Mental Status  Alert  Oriented to person, place and time  Speech is normal  Language is fluent with no aphasia  Cranial Nerves  CN II: Right visual acuity: counts fingers  Left visual acuity: counts fingers  CN III, IV, VI: Pupils equal round and reactive to light bilaterally  CN V: Facial sensation is normal   CN VII: Full and symmetric facial movement  CN VIII: Hearing is normal   CN IX, X: Palate elevates symmetrically  CN XI: Shoulder shrug strength is normal   CN XII: Tongue midline without atrophy or fasciculations  Motor  Normal muscle bulk throughout  Strength is 5/5 throughout all four extremities      Reflexes Right                      Left  Brachioradialis                    2+                         2+  Patellar                                2+                         2+    Coordination  Right: Rapid alternating movement normal   Left: Rapid alternating movement normal     Gait  Casual gait is normal including stance, stride, and arm swing  Romberg is absent  Able to rise from chair without using arms  ROS:    Review of Systems   Constitutional: Negative  Negative for appetite change and fever  HENT: Negative  Negative for hearing loss, tinnitus, trouble swallowing and voice change  Eyes: Negative  Negative for photophobia and pain  Respiratory: Negative  Negative for shortness of breath  Cardiovascular: Negative  Negative for palpitations  Gastrointestinal: Negative  Negative for nausea and vomiting  Endocrine: Negative  Negative for cold intolerance  Genitourinary: Negative  Negative for dysuria, frequency and urgency  Musculoskeletal: Negative  Negative for myalgias and neck pain  Skin: Negative  Negative for rash  Neurological: Positive for headaches (Constant)  Negative for dizziness, tremors, seizures, syncope, facial asymmetry, speech difficulty, weakness, light-headedness and numbness  Hematological: Negative  Does not bruise/bleed easily  Psychiatric/Behavioral: Negative  Negative for confusion, hallucinations and sleep disturbance     ROS was reviewed and updated as appropriate

## 2024-01-11 ENCOUNTER — HOSPITAL ENCOUNTER (EMERGENCY)
Facility: HOSPITAL | Age: 49
Discharge: HOME | End: 2024-01-11
Attending: EMERGENCY MEDICINE
Payer: COMMERCIAL

## 2024-01-11 VITALS
OXYGEN SATURATION: 96 % | DIASTOLIC BLOOD PRESSURE: 88 MMHG | HEART RATE: 82 BPM | TEMPERATURE: 97.8 F | SYSTOLIC BLOOD PRESSURE: 141 MMHG | RESPIRATION RATE: 18 BRPM

## 2024-01-11 DIAGNOSIS — M54.9 MUSCULOSKELETAL BACK PAIN: ICD-10-CM

## 2024-01-11 DIAGNOSIS — M54.50 ACUTE LOW BACK PAIN: Primary | ICD-10-CM

## 2024-01-11 PROCEDURE — 96372 THER/PROPH/DIAG INJ SC/IM: CPT

## 2024-01-11 PROCEDURE — 99284 EMERGENCY DEPT VISIT MOD MDM: CPT | Performed by: EMERGENCY MEDICINE

## 2024-01-11 PROCEDURE — 99282 EMERGENCY DEPT VISIT SF MDM: CPT

## 2024-01-11 RX ORDER — KETOROLAC TROMETHAMINE 30 MG/ML
15 INJECTION, SOLUTION INTRAMUSCULAR; INTRAVENOUS ONCE
Status: COMPLETED | OUTPATIENT
Start: 2024-01-11 | End: 2024-01-11

## 2024-01-11 RX ORDER — LIDOCAINE 50 MG/G
1 PATCH TOPICAL ONCE
Status: DISCONTINUED | OUTPATIENT
Start: 2024-01-11 | End: 2024-01-11 | Stop reason: HOSPADM

## 2024-01-11 RX ORDER — ACETAMINOPHEN 325 MG/1
975 TABLET ORAL ONCE
Status: COMPLETED | OUTPATIENT
Start: 2024-01-11 | End: 2024-01-11

## 2024-01-11 RX ORDER — METHOCARBAMOL 500 MG/1
500 TABLET, FILM COATED ORAL 3 TIMES DAILY PRN
Qty: 20 TABLET | Refills: 0 | Status: SHIPPED | OUTPATIENT
Start: 2024-01-11

## 2024-01-11 RX ADMIN — LIDOCAINE 5% 1 PATCH: 700 PATCH TOPICAL at 13:04

## 2024-01-11 RX ADMIN — ACETAMINOPHEN 975 MG: 325 TABLET ORAL at 13:03

## 2024-01-11 RX ADMIN — KETOROLAC TROMETHAMINE 15 MG: 30 INJECTION, SOLUTION INTRAMUSCULAR; INTRAVENOUS at 13:04

## 2024-01-11 NOTE — Clinical Note
Benjamin Kocher was seen and treated in our emergency department on 1/11/2024.                Diagnosis: acute low back pain    Alexx  may return to work on return date.    He may return on this date: 01/15/2024         If you have any questions or concerns, please don't hesitate to call.      Carlos Nevarez, DO    ______________________________           _______________          _______________  Hospital Representative                              Date                                Time

## 2024-01-11 NOTE — ED PROVIDER NOTES
History  Chief Complaint   Patient presents with    Back Pain     Patient comes in for lower back pain that has been going on for the past week. Patient reports trying to use a heating pad, advil with no relief.      Patient is a 48-year-old male who presents for evaluation of low back pain.  Patient says he has had the symptoms since last Friday.  He denies any direct trauma to his back.  The pain does not radiate anywhere else.  He says he took some Advil with little relief.  He denies any radiation into the legs.  Denies any bowel bladder incontinence, urinary tension, saddle anesthesia.  Exam, he has paraspinal muscle tenderness.        Prior to Admission Medications   Prescriptions Last Dose Informant Patient Reported? Taking?   Clenpiq 10-3.5-12 MG-GM -GM/160ML SOLN   Yes No   Sig: TAKE 320 ML AS DIRECTED-FOLLOW INSTRUCTIONS PROVIDED BY OFFICE   aspirin 81 mg chewable tablet  Self No No   Sig: Chew 1 tablet (81 mg total) daily   atorvastatin (LIPITOR) 40 mg tablet  Self No No   Sig: Take 1 tablet (40 mg total) by mouth every evening   Patient not taking: Reported on 3/23/2022    azelastine (ASTELIN) 0.1 % nasal spray  Self No No   Si spray into each nostril 2 (two) times a day   nortriptyline (PAMELOR) 10 mg capsule   No No   Sig: Take 1 capsule nightly for 1 week and increase to 2 capsules nightly after that.      Facility-Administered Medications: None       Past Medical History:   Diagnosis Date    COPD (chronic obstructive pulmonary disease) (formerly [facility])     DVT of lower limb, acute (formerly [facility])     MI (myocardial infarction) (formerly [facility])        Past Surgical History:   Procedure Laterality Date    KNEE SURGERY         Family History   Problem Relation Age of Onset    Hypertension Mother     Heart attack Father 42            Heart failure Paternal Aunt         pacemaker    Heart disease Paternal Uncle     Heart failure Paternal Grandmother         pacemaker    Heart attack Paternal Grandfather         cabg  "   Heart disease Paternal Grandfather 62        heart transplant     I have reviewed and agree with the history as documented.    E-Cigarette/Vaping    E-Cigarette Use Never User      E-Cigarette/Vaping Substances    Nicotine No     THC No     CBD No     Flavoring No     Other No     Unknown No      Social History     Tobacco Use    Smoking status: Former     Current packs/day: 0.00     Average packs/day: 0.5 packs/day for 24.0 years (12.0 ttl pk-yrs)     Types: Cigarettes     Start date:      Quit date: 2018     Years since quittin.0    Smokeless tobacco: Current    Tobacco comments:     \"here and there a cigerette\"   Vaping Use    Vaping status: Never Used   Substance Use Topics    Alcohol use: Not Currently    Drug use: Not Currently       Review of Systems   Constitutional:  Negative for chills, fever and unexpected weight change.   HENT:  Negative for congestion, sore throat and trouble swallowing.    Eyes:  Negative for pain, discharge and itching.   Respiratory:  Negative for cough, chest tightness, shortness of breath and wheezing.    Cardiovascular:  Negative for chest pain, palpitations and leg swelling.   Gastrointestinal:  Negative for abdominal pain, blood in stool, diarrhea, nausea and vomiting.   Endocrine: Negative for polyuria.   Genitourinary:  Negative for difficulty urinating, dysuria, frequency and hematuria.   Musculoskeletal:  Positive for back pain. Negative for arthralgias and gait problem.   Neurological:  Negative for dizziness, syncope, weakness, light-headedness and headaches.       Physical Exam  Physical Exam  Vitals and nursing note reviewed.   Constitutional:       General: He is not in acute distress.     Appearance: He is well-developed.   HENT:      Head: Normocephalic and atraumatic.      Right Ear: External ear normal.      Left Ear: External ear normal.   Eyes:      Conjunctiva/sclera: Conjunctivae normal.      Pupils: Pupils are equal, round, and reactive to light. "   Cardiovascular:      Rate and Rhythm: Normal rate and regular rhythm.      Heart sounds: Normal heart sounds. No murmur heard.     No friction rub. No gallop.   Pulmonary:      Effort: Pulmonary effort is normal. No respiratory distress.      Breath sounds: Normal breath sounds. No wheezing or rales.   Abdominal:      General: Bowel sounds are normal. There is no distension.      Palpations: Abdomen is soft.      Tenderness: There is no abdominal tenderness. There is no guarding.   Musculoskeletal:         General: Tenderness present. No deformity. Normal range of motion.      Cervical back: Normal range of motion.        Legs:    Lymphadenopathy:      Cervical: No cervical adenopathy.   Skin:     General: Skin is warm and dry.   Neurological:      General: No focal deficit present.      Mental Status: He is alert and oriented to person, place, and time. Mental status is at baseline.      Cranial Nerves: No cranial nerve deficit.      Sensory: No sensory deficit.      Motor: No weakness or abnormal muscle tone.   Psychiatric:         Behavior: Behavior normal.         Vital Signs  ED Triage Vitals [01/11/24 1119]   Temperature Pulse Respirations Blood Pressure SpO2   97.8 °F (36.6 °C) 82 18 141/88 96 %      Temp Source Heart Rate Source Patient Position - Orthostatic VS BP Location FiO2 (%)   Temporal Monitor -- -- --      Pain Score       6           Vitals:    01/11/24 1119   BP: 141/88   Pulse: 82         Visual Acuity      ED Medications  Medications   ketorolac (TORADOL) injection 15 mg (15 mg Intramuscular Given 1/11/24 1304)   acetaminophen (TYLENOL) tablet 975 mg (975 mg Oral Given 1/11/24 1303)       Diagnostic Studies  Results Reviewed       None                   No orders to display              Procedures  Procedures         ED Course                               SBIRT 22yo+      Flowsheet Row Most Recent Value   Initial Alcohol Screen: US AUDIT-C     1. How often do you have a drink containing  alcohol? 0 Filed at: 01/11/2024 1120   2. How many drinks containing alcohol do you have on a typical day you are drinking?  0 Filed at: 01/11/2024 1120   3a. Male UNDER 65: How often do you have five or more drinks on one occasion? 0 Filed at: 01/11/2024 1120   3b. FEMALE Any Age, or MALE 65+: How often do you have 4 or more drinks on one occassion? 0 Filed at: 01/11/2024 1120   Audit-C Score 0 Filed at: 01/11/2024 1120   JOVAN: How many times in the past year have you...    Used an illegal drug or used a prescription medication for non-medical reasons? Never Filed at: 01/11/2024 1120                      Medical Decision Making  48-year-old male presenting for low back pain.  Symptoms since Friday.  Nonradiating.  No red flag symptoms.  Paraspinal tenderness on exam.  No trauma to the back.  The symptoms are likely secondary to ocular strain.  Given no trauma, no red flag symptoms do not believe imaging is required.  Will treat with Toradol, Tylenol, Lidoderm patch.  Explained to patient that given no red flag symptoms I would not be able to get an MRI here.  I told him that he would need follow-up with his family doctor or spine and pain for that.  Referral for spine and pain was placed    Problems Addressed:  Acute low back pain: acute illness or injury  Musculoskeletal back pain: acute illness or injury    Risk  OTC drugs.  Prescription drug management.             Disposition  Final diagnoses:   Acute low back pain   Musculoskeletal back pain     Time reflects when diagnosis was documented in both MDM as applicable and the Disposition within this note       Time User Action Codes Description Comment    1/11/2024  1:12 PM Carlos Nevarez Add [M54.50] Acute low back pain     1/11/2024  1:12 PM Carlos Nevarez Add [M54.9] Musculoskeletal back pain           ED Disposition       ED Disposition   Discharge    Condition   Stable    Date/Time   Thu Jan 11, 2024 1312    Comment   Benjamin W Kocher discharge to home/self  care.                   Follow-up Information       Follow up With Specialties Details Why Contact Info Additional Information    Kirk Chakraborty MD Family Medicine Schedule an appointment as soon as possible for a visit  For follow up of symptoms [address]  [address]  [facility] [address] [phone]       [facility] Schedule an appointment as soon as possible for a visit  For follow up of back pain [address] [address] [phone] [facility], [address] [place] [address], [place], [place], [address] [phone]            Discharge Medication List as of 1/11/2024  1:20 PM        START taking these medications    Details   methocarbamol (ROBAXIN) 500 mg tablet Take 1 tablet (500 mg total) by mouth 3 (three) times a day as needed for muscle spasms, Starting Thu 1/11/2024, Normal           CONTINUE these medications which have NOT CHANGED    Details   aspirin 81 mg chewable tablet Chew 1 tablet (81 mg total) daily, Starting Thu 3/10/2022, Normal      atorvastatin (LIPITOR) 40 mg tablet Take 1 tablet (40 mg total) by mouth every evening, Starting Wed 3/9/2022, Normal      azelastine (ASTELIN) 0.1 % nasal spray 1 spray into each nostril 2 (two) times a day, Starting Mon 11/15/2021, Normal      Clenpiq 10-3.5-12 MG-GM -GM/160ML SOLN TAKE 320 ML AS DIRECTED-FOLLOW INSTRUCTIONS PROVIDED BY OFFICE, Historical Med      nortriptyline (PAMELOR) 10 mg capsule Take 1 capsule nightly for 1 week and increase to 2 capsules nightly after that., Normal                 PDMP Review       None            ED Provider  Electronically Signed by             Carlos Nevarez DO  01/11/24 9978

## 2024-01-12 ENCOUNTER — TELEPHONE (OUTPATIENT)
Dept: PHYSICAL THERAPY | Facility: OTHER | Age: 49
End: 2024-01-12

## 2024-01-12 NOTE — TELEPHONE ENCOUNTER
Call placed to the patient per [facility] referral.    Voice message left for patient to call back. Phone number and hours of business provided.     This is the 1st attempt to reach the patient.  Will defer per protocol.

## 2024-01-16 NOTE — TELEPHONE ENCOUNTER
Call placed to the patient per [facility] referral.    Spoke with the patient who is interested in PT eval, but will need to call us back. He ws currently testing to see if he was + for COVID    Pt will call us back when feeling better, as his back is still hurting    [facility] closed will assist patient when he calls back in

## 2024-06-03 ENCOUNTER — OFFICE VISIT (OUTPATIENT)
Dept: URGENT CARE | Facility: CLINIC | Age: 49
End: 2024-06-03
Payer: COMMERCIAL

## 2024-06-03 VITALS
RESPIRATION RATE: 18 BRPM | WEIGHT: 213 LBS | HEART RATE: 91 BPM | BODY MASS INDEX: 30.49 KG/M2 | SYSTOLIC BLOOD PRESSURE: 140 MMHG | TEMPERATURE: 98 F | OXYGEN SATURATION: 98 % | DIASTOLIC BLOOD PRESSURE: 78 MMHG | HEIGHT: 70 IN

## 2024-06-03 DIAGNOSIS — J02.0 STREP PHARYNGITIS: Primary | ICD-10-CM

## 2024-06-03 LAB — S PYO AG THROAT QL: POSITIVE

## 2024-06-03 PROCEDURE — G0382 LEV 3 HOSP TYPE B ED VISIT: HCPCS | Performed by: PHYSICIAN ASSISTANT

## 2024-06-03 PROCEDURE — S9083 URGENT CARE CENTER GLOBAL: HCPCS | Performed by: PHYSICIAN ASSISTANT

## 2024-06-03 PROCEDURE — 87880 STREP A ASSAY W/OPTIC: CPT | Performed by: PHYSICIAN ASSISTANT

## 2024-06-03 RX ORDER — AMOXICILLIN 500 MG/1
500 TABLET, FILM COATED ORAL 2 TIMES DAILY
Qty: 20 TABLET | Refills: 0 | Status: SHIPPED | OUTPATIENT
Start: 2024-06-03 | End: 2024-06-13

## 2024-06-03 NOTE — LETTER
Brenda 3, 2024     Patient: Benjamin W Kocher  YOB: 1975  Date of Visit: 6/3/2024      To Whom it May Concern:    Benjamin Kocher is under my professional care. Alexx was seen in my office on 6/3/2024. Alexx may return to work on 6/5 .    If you have any questions or concerns, please don't hesitate to call.         Sincerely,          Moriah Min PA-C        CC: No Recipients

## 2024-06-03 NOTE — PROGRESS NOTES
[facility]      NAME: Benjamin W Kocher is a 49 y.o. male  : 1975    MRN: [id number]  DATE: Brenda 3, 2024  TIME: 8:19 PM    Assessment and Plan   Strep pharyngitis [J02.0]  1. Strep pharyngitis  POCT rapid strepA    amoxicillin (AMOXIL) 500 MG tablet          Patient Instructions   Rapid strep +. Will tx with amox with probiotics.     Risks and benefits discussed. Patient understands and agrees with the plan.      If tests have been performed at [facility], our office will contact you with results if changes need to be made to the care plan discussed with you at the visit.  You can review your full results on [facility]'s MyChart.     Follow up with PCP in 3-5 days.      If any of the following occur, please report to your nearest ED for evaluation or call 911.   Difficultly breathing or shortness of breath  Chest pain  Acutely worsening symptoms.   To present to the ER if symptoms worsen.  Chief Complaint     Chief Complaint   Patient presents with    Sore Throat     Started yesterday with sore throat. Hard time swallowing.          History of Present Illness   Benjamin W Kocher presents to the clinic c/o    Sore Throat   This is a new problem. The current episode started yesterday. The problem has been unchanged. Neither side of throat is experiencing more pain than the other. There has been no fever. The pain is at a severity of 4/10. The pain is moderate. Associated symptoms include coughing. Pertinent negatives include no abdominal pain, congestion, ear discharge, ear pain, headaches or shortness of breath. He has tried nothing for the symptoms. The treatment provided no relief.       Review of Systems   Review of Systems   Constitutional:  Negative for chills, diaphoresis, fatigue and fever.   HENT:  Positive for sore throat. Negative for congestion, ear discharge, ear pain and facial swelling.    Eyes:  Negative for photophobia, pain, discharge, redness, itching and visual disturbance.    Respiratory:  Positive for cough. Negative for apnea, chest tightness, shortness of breath and wheezing.    Cardiovascular:  Negative for chest pain and palpitations.   Gastrointestinal:  Negative for abdominal pain.   Skin:  Negative for color change, rash and wound.   Neurological:  Negative for dizziness and headaches.   Hematological:  Negative for adenopathy.         Current Medications     Long-Term Medications   Medication Sig Dispense Refill    aspirin 81 mg chewable tablet Chew 1 tablet (81 mg total) daily 30 tablet 0    atorvastatin (LIPITOR) 40 mg tablet Take 1 tablet (40 mg total) by mouth every evening (Patient not taking: Reported on 3/23/2022 ) 30 tablet 0    azelastine (ASTELIN) 0.1 % nasal spray 1 spray into each nostril 2 (two) times a day (Patient not taking: Reported on 6/3/2024) 30 mL 11    methocarbamol (ROBAXIN) 500 mg tablet Take 1 tablet (500 mg total) by mouth 3 (three) times a day as needed for muscle spasms (Patient not taking: Reported on 6/3/2024) 20 tablet 0    nortriptyline (PAMELOR) 10 mg capsule Take 1 capsule nightly for 1 week and increase to 2 capsules nightly after that. (Patient not taking: Reported on 6/3/2024) 60 capsule 1       Current Allergies     Allergies as of 06/03/2024    (No Known Allergies)            The following portions of the patient's history were reviewed and updated as appropriate: allergies, current medications, past family history, past medical history, past social history, past surgical history and problem list.  Past Medical History:   Diagnosis Date    COPD (chronic obstructive pulmonary disease) ([facility])     DVT of lower limb, acute ([facility])     MI (myocardial infarction) ([facility]) 2007     Past Surgical History:   Procedure Laterality Date    KNEE SURGERY  2010     Social History     Socioeconomic History    Marital status: Single     Spouse name: Not on file    Number of children: Not on file    Years of education: Not on file    Highest education level: Not  "on file   Occupational History    Not on file   Tobacco Use    Smoking status: Former     Current packs/day: 0.00     Average packs/day: 0.5 packs/day for 24.0 years (12.0 ttl pk-yrs)     Types: Cigarettes     Start date:      Quit date: 2018     Years since quittin.4    Smokeless tobacco: Current    Tobacco comments:     \"here and there a cigerette\"   Vaping Use    Vaping status: Never Used   Substance and Sexual Activity    Alcohol use: Not Currently    Drug use: Not Currently    Sexual activity: Not on file   Other Topics Concern    Not on file   Social History Narrative    Not on file     Social Determinants of Health     Financial Resource Strain: Not on file   Food Insecurity: Not on file   Transportation Needs: Not on file   Physical Activity: Not on file   Stress: Not on file   Social Connections: Not on file   Intimate Partner Violence: Not on file   Housing Stability: Not on file       Objective   /78   Pulse 91   Temp 98 °F (36.7 °C)   Resp 18   Ht 5' 10\" (1.778 m)   Wt 96.6 kg (213 lb)   SpO2 98%   BMI 30.56 kg/m²      Physical Exam     Physical Exam  Vitals and nursing note reviewed.   Constitutional:       General: He is not in acute distress.     Appearance: He is well-developed. He is not diaphoretic.   HENT:      Head: Normocephalic and atraumatic.      Right Ear: Tympanic membrane and external ear normal.      Left Ear: Tympanic membrane and external ear normal.      Nose: Nose normal.      Mouth/Throat:      Mouth: Mucous membranes are moist.      Pharynx: Oropharyngeal exudate and posterior oropharyngeal erythema present.   Eyes:      General: No scleral icterus.        Right eye: No discharge.         Left eye: No discharge.      Conjunctiva/sclera: Conjunctivae normal.   Cardiovascular:      Rate and Rhythm: Normal rate and regular rhythm.      Heart sounds: Normal heart sounds. No murmur heard.     No friction rub. No gallop.   Pulmonary:      Effort: Pulmonary effort is " normal. No respiratory distress.      Breath sounds: Normal breath sounds. No decreased breath sounds, wheezing, rhonchi or rales.   Lymphadenopathy:      Cervical: Cervical adenopathy present.      Right cervical: Superficial cervical adenopathy present.      Left cervical: No superficial cervical adenopathy.   Skin:     General: Skin is warm and dry.      Coloration: Skin is not pale.      Findings: No erythema or rash.   Neurological:      Mental Status: He is alert and oriented to person, place, and time.   Psychiatric:         Behavior: Behavior normal.         Thought Content: Thought content normal.         Judgment: Judgment normal.         Moriah Min PA-C

## 2024-06-07 ENCOUNTER — OFFICE VISIT (OUTPATIENT)
Dept: URGENT CARE | Facility: CLINIC | Age: 49
End: 2024-06-07
Payer: COMMERCIAL

## 2024-06-07 VITALS
HEART RATE: 72 BPM | RESPIRATION RATE: 18 BRPM | BODY MASS INDEX: 30.49 KG/M2 | SYSTOLIC BLOOD PRESSURE: 152 MMHG | HEIGHT: 70 IN | OXYGEN SATURATION: 99 % | TEMPERATURE: 97.3 F | WEIGHT: 213 LBS | DIASTOLIC BLOOD PRESSURE: 88 MMHG

## 2024-06-07 DIAGNOSIS — B37.0 ORAL THRUSH: Primary | ICD-10-CM

## 2024-06-07 PROCEDURE — G0382 LEV 3 HOSP TYPE B ED VISIT: HCPCS | Performed by: ORTHOPAEDIC SURGERY

## 2024-06-07 PROCEDURE — S9083 URGENT CARE CENTER GLOBAL: HCPCS | Performed by: ORTHOPAEDIC SURGERY

## 2024-06-07 RX ORDER — FLUCONAZOLE 150 MG/1
150 TABLET ORAL DAILY
Qty: 14 TABLET | Refills: 0 | Status: SHIPPED | OUTPATIENT
Start: 2024-06-07 | End: 2024-06-21

## 2024-06-07 NOTE — PROGRESS NOTES
[facility]        NAME: Benjamin W Kocher is a 49 y.o. male  : 1975    MRN: [id number]  DATE: 2024  TIME: 4:44 PM    Assessment and Plan   Oral thrush [B37.0]  1. Oral thrush  fluconazole (DIFLUCAN) 150 mg tablet    DISCONTINUED: nystatin (MYCOSTATIN) 500,000 units/5 mL suspension        Liquid nystatin changed to fluconazole as pharmacy noted nystatin is on back order.     Patient Instructions     Continue to take antibiotics as previously prescribed, per previous visit  Begin oral anti-fungal as prescribed for likely thrush  Follow up with PCP in 3-5 days.  Proceed to  ER if symptoms worsen.    If tests are performed, our office will contact you with results only if changes need to made to the care plan discussed with you at the visit. You can review your full results on [facility].    Chief Complaint     Chief Complaint   Patient presents with    Sore Throat     Patient was seen here 6/3 and was treated for strep.  Patient now has white film on tongue that started two days ago.         History of Present Illness       49 YOM presents to the urgent care for evaluation of an ongoing sore throat, with thick white plaque on his tongue and back of throat. The patient was seen at the clinic on Monday, found to have positive strep. The patient has been taking the amoxicillin as prescribed, though his symptoms have not improved. He actually notes the development of tongue plaque and soreness as well as the ongoing sore throat. He has had thrush in the past, and notes this feels quite similar. He denies any difficulty swallowing, breathing, or talking. No fevers. No history of diabetes or use of medicated inhalers.         Review of Systems   Review of Systems   Constitutional:  Negative for chills and fever.   HENT:  Positive for mouth sores (painful plaque on tongue, back of throat) and sore throat. Negative for congestion and ear pain.    Eyes:  Negative for pain and visual  disturbance.   Respiratory:  Negative for cough and shortness of breath.    Cardiovascular:  Negative for chest pain and palpitations.   Gastrointestinal:  Negative for abdominal pain and vomiting.   Genitourinary:  Negative for dysuria and hematuria.   Musculoskeletal:  Negative for arthralgias and back pain.   Skin:  Negative for color change and rash.   Neurological:  Negative for seizures and syncope.   All other systems reviewed and are negative.        Current Medications       Current Outpatient Medications:     amoxicillin (AMOXIL) 500 MG tablet, Take 1 tablet (500 mg total) by mouth 2 (two) times a day for 10 days, Disp: 20 tablet, Rfl: 0    aspirin 81 mg chewable tablet, Chew 1 tablet (81 mg total) daily, Disp: 30 tablet, Rfl: 0    fluconazole (DIFLUCAN) 150 mg tablet, Take 1 tablet (150 mg total) by mouth daily for 14 doses, Disp: 14 tablet, Rfl: 0    atorvastatin (LIPITOR) 40 mg tablet, Take 1 tablet (40 mg total) by mouth every evening (Patient not taking: Reported on 3/23/2022 ), Disp: 30 tablet, Rfl: 0    azelastine (ASTELIN) 0.1 % nasal spray, 1 spray into each nostril 2 (two) times a day (Patient not taking: Reported on 6/3/2024), Disp: 30 mL, Rfl: 11    Clenpiq 10-3.5-12 MG-GM -GM/160ML SOLN, TAKE 320 ML AS DIRECTED-FOLLOW INSTRUCTIONS PROVIDED BY OFFICE (Patient not taking: Reported on 6/3/2024), Disp: , Rfl:     methocarbamol (ROBAXIN) 500 mg tablet, Take 1 tablet (500 mg total) by mouth 3 (three) times a day as needed for muscle spasms (Patient not taking: Reported on 6/3/2024), Disp: 20 tablet, Rfl: 0    nortriptyline (PAMELOR) 10 mg capsule, Take 1 capsule nightly for 1 week and increase to 2 capsules nightly after that. (Patient not taking: Reported on 6/3/2024), Disp: 60 capsule, Rfl: 1    Current Allergies     Allergies as of 06/07/2024    (No Known Allergies)            The following portions of the patient's history were reviewed and updated as appropriate: allergies, current medications,  "past family history, past medical history, past social history, past surgical history and problem list.     Past Medical History:   Diagnosis Date    COPD (chronic obstructive pulmonary disease) ([facility])     DVT of lower limb, acute ([facility])     MI (myocardial infarction) ([facility])        Past Surgical History:   Procedure Laterality Date    KNEE SURGERY         Family History   Problem Relation Age of Onset    Hypertension Mother     Heart attack Father 42            Heart failure Paternal Aunt         pacemaker    Heart disease Paternal Uncle     Heart failure Paternal Grandmother         pacemaker    Heart attack Paternal Grandfather         cabg    Heart disease Paternal Grandfather 62        heart transplant         Medications have been verified.        Objective   /88   Pulse 72   Temp (!) 97.3 °F (36.3 °C) (Temporal)   Resp 18   Ht 5' 10\" (1.778 m)   Wt 96.6 kg (213 lb)   SpO2 99%   BMI 30.56 kg/m²        Physical Exam     Physical Exam  Vitals and nursing note reviewed.   Constitutional:       General: He is not in acute distress.     Appearance: Normal appearance. He is not ill-appearing.   HENT:      Head: Normocephalic and atraumatic.      Right Ear: Tympanic membrane normal.      Left Ear: Tympanic membrane normal.      Nose: Nose normal.      Mouth/Throat:      Mouth: Mucous membranes are moist.      Pharynx: Oropharyngeal exudate (throughout the surface of the tongue there is thick yellow-white plaque. THe oropharynx is erythematous with numerous white, spotted exudate. Tonsils are enlarged.) and posterior oropharyngeal erythema present.   Eyes:      Extraocular Movements: Extraocular movements intact.      Pupils: Pupils are equal, round, and reactive to light.   Cardiovascular:      Rate and Rhythm: Normal rate and regular rhythm.      Pulses: Normal pulses.      Heart sounds: Normal heart sounds. No murmur heard.  Pulmonary:      Effort: Pulmonary effort is normal. No respiratory " distress.      Breath sounds: Normal breath sounds. No wheezing or rhonchi.   Abdominal:      Palpations: Abdomen is soft.      Tenderness: There is no abdominal tenderness.   Musculoskeletal:         General: Normal range of motion.      Cervical back: Normal range of motion.   Lymphadenopathy:      Cervical: No cervical adenopathy.   Skin:     General: Skin is warm and dry.      Capillary Refill: Capillary refill takes less than 2 seconds.   Neurological:      General: No focal deficit present.      Mental Status: He is alert and oriented to person, place, and time.   Psychiatric:         Mood and Affect: Mood normal.         Behavior: Behavior normal.

## 2024-06-12 ENCOUNTER — APPOINTMENT (EMERGENCY)
Dept: CT IMAGING | Facility: HOSPITAL | Age: 49
End: 2024-06-12
Payer: COMMERCIAL

## 2024-06-12 ENCOUNTER — HOSPITAL ENCOUNTER (EMERGENCY)
Facility: HOSPITAL | Age: 49
Discharge: HOME | End: 2024-06-13
Attending: EMERGENCY MEDICINE
Payer: COMMERCIAL

## 2024-06-12 DIAGNOSIS — R10.9 FLANK PAIN: Primary | ICD-10-CM

## 2024-06-12 LAB
ALBUMIN SERPL BCP-MCNC: 4.2 G/DL (ref 3.5–5)
ALP SERPL-CCNC: 125 U/L (ref 34–104)
ALT SERPL W P-5'-P-CCNC: 26 U/L (ref 7–52)
ANION GAP SERPL CALCULATED.3IONS-SCNC: 8 MMOL/L (ref 4–13)
AST SERPL W P-5'-P-CCNC: 19 U/L (ref 13–39)
BASOPHILS # BLD AUTO: 0.07 THOUSANDS/ÂΜL (ref 0–0.1)
BASOPHILS NFR BLD AUTO: 1 % (ref 0–1)
BILIRUB SERPL-MCNC: 0.28 MG/DL (ref 0.2–1)
BILIRUB UR QL STRIP: NEGATIVE
BUN SERPL-MCNC: 18 MG/DL (ref 5–25)
CALCIUM SERPL-MCNC: 9.4 MG/DL (ref 8.4–10.2)
CHLORIDE SERPL-SCNC: 103 MMOL/L (ref 96–108)
CLARITY UR: CLEAR
CO2 SERPL-SCNC: 24 MMOL/L (ref 21–32)
COLOR UR: ABNORMAL
CREAT SERPL-MCNC: 1.1 MG/DL (ref 0.6–1.3)
EOSINOPHIL # BLD AUTO: 0.34 THOUSAND/ÂΜL (ref 0–0.61)
EOSINOPHIL NFR BLD AUTO: 3 % (ref 0–6)
ERYTHROCYTE [DISTWIDTH] IN BLOOD BY AUTOMATED COUNT: 13.8 % (ref 11.6–15.1)
GFR SERPL CREATININE-BSD FRML MDRD: 77 ML/MIN/1.73SQ M
GLUCOSE SERPL-MCNC: 128 MG/DL (ref 65–140)
GLUCOSE UR STRIP-MCNC: NEGATIVE MG/DL
HCT VFR BLD AUTO: 45.4 % (ref 36.5–49.3)
HGB BLD-MCNC: 15.5 G/DL (ref 12–17)
HGB UR QL STRIP.AUTO: ABNORMAL
IMM GRANULOCYTES # BLD AUTO: 0.03 THOUSAND/UL (ref 0–0.2)
IMM GRANULOCYTES NFR BLD AUTO: 0 % (ref 0–2)
KETONES UR STRIP-MCNC: NEGATIVE MG/DL
LEUKOCYTE ESTERASE UR QL STRIP: NEGATIVE
LIPASE SERPL-CCNC: 10 U/L (ref 11–82)
LYMPHOCYTES # BLD AUTO: 4.44 THOUSANDS/ÂΜL (ref 0.6–4.47)
LYMPHOCYTES NFR BLD AUTO: 36 % (ref 14–44)
MCH RBC QN AUTO: 31.9 PG (ref 26.8–34.3)
MCHC RBC AUTO-ENTMCNC: 34.1 G/DL (ref 31.4–37.4)
MCV RBC AUTO: 93 FL (ref 82–98)
MONOCYTES # BLD AUTO: 1.02 THOUSAND/ÂΜL (ref 0.17–1.22)
MONOCYTES NFR BLD AUTO: 8 % (ref 4–12)
NEUTROPHILS # BLD AUTO: 6.6 THOUSANDS/ÂΜL (ref 1.85–7.62)
NEUTS SEG NFR BLD AUTO: 52 % (ref 43–75)
NITRITE UR QL STRIP: NEGATIVE
NRBC BLD AUTO-RTO: 0 /100 WBCS
PH UR STRIP.AUTO: 6 [PH]
PLATELET # BLD AUTO: 299 THOUSANDS/UL (ref 149–390)
PMV BLD AUTO: 9.9 FL (ref 8.9–12.7)
POTASSIUM SERPL-SCNC: 3.7 MMOL/L (ref 3.5–5.3)
PROT SERPL-MCNC: 7.1 G/DL (ref 6.4–8.4)
PROT UR STRIP-MCNC: NEGATIVE MG/DL
RBC # BLD AUTO: 4.86 MILLION/UL (ref 3.88–5.62)
SODIUM SERPL-SCNC: 135 MMOL/L (ref 135–147)
SP GR UR STRIP.AUTO: <=1.005
UROBILINOGEN UR QL STRIP.AUTO: 0.2 E.U./DL
WBC # BLD AUTO: 12.5 THOUSAND/UL (ref 4.31–10.16)

## 2024-06-12 PROCEDURE — 99284 EMERGENCY DEPT VISIT MOD MDM: CPT | Performed by: EMERGENCY MEDICINE

## 2024-06-12 PROCEDURE — 80053 COMPREHEN METABOLIC PANEL: CPT | Performed by: EMERGENCY MEDICINE

## 2024-06-12 PROCEDURE — 81001 URINALYSIS AUTO W/SCOPE: CPT | Performed by: EMERGENCY MEDICINE

## 2024-06-12 PROCEDURE — 36415 COLL VENOUS BLD VENIPUNCTURE: CPT | Performed by: EMERGENCY MEDICINE

## 2024-06-12 PROCEDURE — 96374 THER/PROPH/DIAG INJ IV PUSH: CPT

## 2024-06-12 PROCEDURE — 99284 EMERGENCY DEPT VISIT MOD MDM: CPT

## 2024-06-12 PROCEDURE — 83690 ASSAY OF LIPASE: CPT | Performed by: EMERGENCY MEDICINE

## 2024-06-12 PROCEDURE — 85025 COMPLETE CBC W/AUTO DIFF WBC: CPT | Performed by: EMERGENCY MEDICINE

## 2024-06-12 PROCEDURE — 74176 CT ABD & PELVIS W/O CONTRAST: CPT

## 2024-06-12 RX ORDER — KETOROLAC TROMETHAMINE 30 MG/ML
15 INJECTION, SOLUTION INTRAMUSCULAR; INTRAVENOUS ONCE
Status: COMPLETED | OUTPATIENT
Start: 2024-06-12 | End: 2024-06-12

## 2024-06-12 RX ADMIN — KETOROLAC TROMETHAMINE 15 MG: 30 INJECTION, SOLUTION INTRAMUSCULAR at 23:21

## 2024-06-13 VITALS
BODY MASS INDEX: 30.06 KG/M2 | TEMPERATURE: 97.3 F | OXYGEN SATURATION: 93 % | SYSTOLIC BLOOD PRESSURE: 153 MMHG | HEIGHT: 70 IN | HEART RATE: 71 BPM | RESPIRATION RATE: 16 BRPM | DIASTOLIC BLOOD PRESSURE: 96 MMHG | WEIGHT: 210 LBS

## 2024-06-13 LAB
BACTERIA UR QL AUTO: NORMAL /HPF
NON-SQ EPI CELLS URNS QL MICRO: NORMAL /HPF
RBC #/AREA URNS AUTO: NORMAL /HPF
WBC #/AREA URNS AUTO: NORMAL /HPF

## 2024-06-13 NOTE — ED PROVIDER NOTES
History  Chief Complaint   Patient presents with    Flank Pain     Right sided back pain, kidney area. Hx: kidney stones. No nausea, no vomiting. 8/10 pain runs down into right groin    Groin Pain     49-year-old male states this is identical to previous stones.  States the pain was severe, slightly improved now.  Urination has been normal.  Bowel movements been normal.  No trauma to the area.  Does note that he is extremely active so it would not be a surprise for him if he had strained a muscle during the course of his daily activities.  No fevers, no chills.        Prior to Admission Medications   Prescriptions Last Dose Informant Patient Reported? Taking?   Clenpiq 10-3.5-12 MG-GM -GM/160ML SOLN   Yes No   Sig: TAKE 320 ML AS DIRECTED-FOLLOW INSTRUCTIONS PROVIDED BY OFFICE   Patient not taking: Reported on 6/3/2024   amoxicillin (AMOXIL) 500 MG tablet   No No   Sig: Take 1 tablet (500 mg total) by mouth 2 (two) times a day for 10 days   aspirin 81 mg chewable tablet  Self No No   Sig: Chew 1 tablet (81 mg total) daily   atorvastatin (LIPITOR) 40 mg tablet  Self No No   Sig: Take 1 tablet (40 mg total) by mouth every evening   Patient not taking: Reported on 3/23/2022    azelastine (ASTELIN) 0.1 % nasal spray  Self No No   Si spray into each nostril 2 (two) times a day   Patient not taking: Reported on 6/3/2024   fluconazole (DIFLUCAN) 150 mg tablet   No No   Sig: Take 1 tablet (150 mg total) by mouth daily for 14 doses   methocarbamol (ROBAXIN) 500 mg tablet   No No   Sig: Take 1 tablet (500 mg total) by mouth 3 (three) times a day as needed for muscle spasms   Patient not taking: Reported on 6/3/2024   nortriptyline (PAMELOR) 10 mg capsule   No No   Sig: Take 1 capsule nightly for 1 week and increase to 2 capsules nightly after that.   Patient not taking: Reported on 6/3/2024      Facility-Administered Medications: None       Past Medical History:   Diagnosis Date    COPD (chronic obstructive pulmonary  "disease) (HCC)     DVT of lower limb, acute (HCC)     MI (myocardial infarction) ([facility])        Past Surgical History:   Procedure Laterality Date    KNEE SURGERY  2010       Family History   Problem Relation Age of Onset    Hypertension Mother     Heart attack Father 42            Heart failure Paternal Aunt         pacemaker    Heart disease Paternal Uncle     Heart failure Paternal Grandmother         pacemaker    Heart attack Paternal Grandfather         cabg    Heart disease Paternal Grandfather 62        heart transplant     I have reviewed and agree with the history as documented.    E-Cigarette/Vaping    E-Cigarette Use Never User      E-Cigarette/Vaping Substances    Nicotine No     THC No     CBD No     Flavoring No     Other No     Unknown No      Social History     Tobacco Use    Smoking status: Former     Current packs/day: 0.00     Average packs/day: 0.5 packs/day for 24.0 years (12.0 ttl pk-yrs)     Types: Cigarettes     Start date:      Quit date: 2018     Years since quittin.4    Smokeless tobacco: Current    Tobacco comments:     \"here and there a cigerette\"   Vaping Use    Vaping status: Never Used   Substance Use Topics    Alcohol use: Not Currently    Drug use: Not Currently       Review of Systems   Constitutional:  Negative for chills and fever.   Eyes:  Negative for visual disturbance.   Respiratory:  Negative for shortness of breath.    Cardiovascular:  Negative for chest pain.   Gastrointestinal:  Positive for abdominal pain and nausea. Negative for abdominal distention.   Endocrine: Negative for polyuria.   Genitourinary:  Negative for decreased urine volume and dysuria.   Neurological:  Negative for dizziness, syncope and weakness.       Physical Exam  Physical Exam  Constitutional:       General: He is not in acute distress.     Appearance: He is well-developed.   HENT:      Head: Normocephalic and atraumatic.      Right Ear: External ear normal.      Left Ear: External " ear normal.      Nose: Nose normal.      Mouth/Throat:      Mouth: Mucous membranes are moist.      Pharynx: Oropharynx is clear.   Eyes:      Conjunctiva/sclera: Conjunctivae normal.      Pupils: Pupils are equal, round, and reactive to light.   Cardiovascular:      Rate and Rhythm: Normal rate and regular rhythm.      Heart sounds: Normal heart sounds.   Pulmonary:      Effort: Pulmonary effort is normal. No respiratory distress.      Breath sounds: Normal breath sounds.   Abdominal:      General: Bowel sounds are normal. There is no distension.      Palpations: Abdomen is soft.      Tenderness: There is no abdominal tenderness. There is no right CVA tenderness, left CVA tenderness, guarding or rebound.   Musculoskeletal:         General: Normal range of motion.      Cervical back: Normal range of motion and neck supple.   Skin:     General: Skin is warm and dry.   Neurological:      Mental Status: He is alert and oriented to person, place, and time.   Psychiatric:         Behavior: Behavior normal.         Thought Content: Thought content normal.         Judgment: Judgment normal.         Vital Signs  ED Triage Vitals [06/12/24 2304]   Temperature Pulse Respirations Blood Pressure SpO2   (!) 97.3 °F (36.3 °C) 81 16 (!) 157/101 96 %      Temp Source Heart Rate Source Patient Position - Orthostatic VS BP Location FiO2 (%)   Temporal Monitor Sitting Left arm --      Pain Score       8           Vitals:    06/12/24 2304 06/12/24 2330 06/13/24 0000 06/13/24 0030   BP: (!) 157/101 140/85 141/80 153/96   Pulse: 81 73 68 71   Patient Position - Orthostatic VS: Sitting            Visual Acuity      ED Medications  Medications   ketorolac (TORADOL) injection 15 mg (15 mg Intravenous Given 6/12/24 2321)       Diagnostic Studies  Results Reviewed       Procedure Component Value Units Date/Time    Urine Microscopic [[id number]]  (Normal) Collected: 06/12/24 5526    Lab Status: Final result Specimen: Urine, Other Updated:  06/13/24 0010     RBC, UA 1-2 /hpf      WBC, UA None Seen /hpf      Epithelial Cells None Seen /hpf      Bacteria, UA None Seen /hpf     UA w Reflex to Microscopic w Reflex to Culture [[id number]]  (Abnormal) Collected: 06/12/24 2353    Lab Status: Final result Specimen: Urine, Other Updated: 06/12/24 2358     Color, UA Straw     Clarity, UA Clear     Specific Gravity, UA <=1.005     pH, UA 6.0     Leukocytes, UA Negative     Nitrite, UA Negative     Protein, UA Negative mg/dl      Glucose, UA Negative mg/dl      Ketones, UA Negative mg/dl      Urobilinogen, UA 0.2 E.U./dl      Bilirubin, UA Negative     Occult Blood, UA Trace-Intact    Lipase [[id number]]  (Abnormal) Collected: 06/12/24 2323    Lab Status: Final result Specimen: Blood from Arm, Right Updated: 06/12/24 2343     Lipase 10 u/L     CMP [[id number]]  (Abnormal) Collected: 06/12/24 2323    Lab Status: Final result Specimen: Blood from Arm, Right Updated: 06/12/24 2343     Sodium 135 mmol/L      Potassium 3.7 mmol/L      Chloride 103 mmol/L      CO2 24 mmol/L      ANION GAP 8 mmol/L      BUN 18 mg/dL      Creatinine 1.10 mg/dL      Glucose 128 mg/dL      Calcium 9.4 mg/dL      AST 19 U/L      ALT 26 U/L      Alkaline Phosphatase 125 U/L      Total Protein 7.1 g/dL      Albumin 4.2 g/dL      Total Bilirubin 0.28 mg/dL      eGFR 77 ml/min/1.73sq m     Narrative:      National Kid[employer] Disease Foundation guidelines for Chronic Kidney Disease (CKD):     Stage 1 with normal or high GFR (GFR > 90 mL/min/1.73 square meters)    Stage 2 Mild CKD (GFR = 60-89 mL/min/1.73 square meters)    Stage 3A Moderate CKD (GFR = 45-59 mL/min/1.73 square meters)    Stage 3B Moderate CKD (GFR = 30-44 mL/min/1.73 square meters)    Stage 4 Severe CKD (GFR = 15-29 mL/min/1.73 square meters)    Stage 5 End Stage CKD (GFR <15 mL/min/1.73 square meters)  Note: GFR calculation is accurate only with a steady state creatinine    CBC and differential [[id number]]  (Abnormal) Collected: 06/12/24  2323    Lab Status: Final result Specimen: Blood from Arm, Right Updated: 06/12/24 2328     WBC 12.50 Thousand/uL      RBC 4.86 Million/uL      Hemoglobin 15.5 g/dL      Hematocrit 45.4 %      MCV 93 fL      MCH 31.9 pg      MCHC 34.1 g/dL      RDW 13.8 %      MPV 9.9 fL      Platelets 299 Thousands/uL      nRBC 0 /100 WBCs      Segmented % 52 %      Immature Grans % 0 %      Lymphocytes % 36 %      Monocytes % 8 %      Eosinophils Relative 3 %      Basophils Relative 1 %      Absolute Neutrophils 6.60 Thousands/µL      Absolute Immature Grans 0.03 Thousand/uL      Absolute Lymphocytes 4.44 Thousands/µL      Absolute Monocytes 1.02 Thousand/µL      Eosinophils Absolute 0.34 Thousand/µL      Basophils Absolute 0.07 Thousands/µL                    CT renal stone study abdomen pelvis without contrast   ED Interpretation by Augustus Navarrete MD (06/12 2345)   Appendix inflammation?      Final Result by Adam Smith MD (06/13 0015)      No evidence of stone or hydronephrosis. Normal appendix.         Workstation performed: [id number]                    Procedures  Procedures         ED Course                               SBIRT 20yo+      Flowsheet Row Most Recent Value   Initial Alcohol Screen: US AUDIT-C     1. How often do you have a drink containing alcohol? 0 Filed at: 06/12/2024 2304   2. How many drinks containing alcohol do you have on a typical day you are drinking?  0 Filed at: 06/12/2024 2304   3a. Male UNDER 65: How often do you have five or more drinks on one occasion? 0 Filed at: 06/12/2024 2304   3b. FEMALE Any Age, or MALE 65+: How often do you have 4 or more drinks on one occassion? 0 Filed at: 06/12/2024 2304   Audit-C Score 0 Filed at: 06/12/2024 2304   JOVAN: How many times in the past year have you...    Used an illegal drug or used a prescription medication for non-medical reasons? Never Filed at: 06/12/2024 2304                      Medical Decision Making  Patient presented with a chief complaint  of abdominal pain. Labs and CT reassuring at this time.  Discussed blood in urine.  Discussed that this may be the result of having passed a kidney stone and that the blood will soon stop.  Discussed need for follow-up for retesting of urine.  Patient's symptoms significantly improved with treatment in the ED.  Vitals remained stable.  Patient is completely clinically nontoxic and tolerating p.o. without difficulty in the ED.  Repeat abdominal exam is benign.  Had a lengthy discussion with the patient in regards to specific signs and symptoms to watch out for that warrant prompt return to the ED. I explained that although there does not appear to be any obvious abnormality at this time that would warrant immediate intervention, if their symptoms change or worsen, they should return to the emergency department as certain diagnoses may take time to develop.  Patient was informed the risk of diagnostic uncertainty.  Patient was given specific instructions for symptomatic relief and follow-up recommendations as discussed in their after visit summary. All of their questions were answered and they were agreeable to plan.      Problems Addressed:  Flank pain: acute illness or injury    Amount and/or Complexity of Data Reviewed  External Data Reviewed: notes.  Labs: ordered.  Radiology: ordered and independent interpretation performed.    Risk  OTC drugs.  Prescription drug management.             Disposition  Final diagnoses:   Flank pain     Time reflects when diagnosis was documented in both MDM as applicable and the Disposition within this note       Time User Action Codes Description Comment    6/13/2024 12:34 AM Augustus Navarrete [R10.9] Flank pain           ED Disposition       ED Disposition   Discharge    Condition   Stable    Date/Time   Thu Jun 13, 2024 0034    Comment   Benjamin W Kocher discharge to home/self care.                   Follow-up Information       Follow up With Specialties Details Why Contact  Info    Kirk Chakraborty MD Family Medicine In 1 day  [address]  [address]  [facility] [address]  [phone]              Discharge Medication List as of 6/13/2024 12:34 AM        CONTINUE these medications which have NOT CHANGED    Details   amoxicillin (AMOXIL) 500 MG tablet Take 1 tablet (500 mg total) by mouth 2 (two) times a day for 10 days, Starting Mon 6/3/2024, Until Thu 6/13/2024, Normal      aspirin 81 mg chewable tablet Chew 1 tablet (81 mg total) daily, Starting Thu 3/10/2022, Normal      atorvastatin (LIPITOR) 40 mg tablet Take 1 tablet (40 mg total) by mouth every evening, Starting Wed 3/9/2022, Normal      azelastine (ASTELIN) 0.1 % nasal spray 1 spray into each nostril 2 (two) times a day, Starting Mon 11/15/2021, Normal      Clenpiq 10-3.5-12 MG-GM -GM/160ML SOLN TAKE 320 ML AS DIRECTED-FOLLOW INSTRUCTIONS PROVIDED BY OFFICE, Historical Med      fluconazole (DIFLUCAN) 150 mg tablet Take 1 tablet (150 mg total) by mouth daily for 14 doses, Starting Fri 6/7/2024, Until Fri 6/21/2024, Normal      methocarbamol (ROBAXIN) 500 mg tablet Take 1 tablet (500 mg total) by mouth 3 (three) times a day as needed for muscle spasms, Starting Thu 1/11/2024, Normal      nortriptyline (PAMELOR) 10 mg capsule Take 1 capsule nightly for 1 week and increase to 2 capsules nightly after that., Normal             No discharge procedures on file.    PDMP Review       None            ED Provider  Electronically Signed by             Augustus Navarrete MD  06/13/24 4448

## 2024-06-16 ENCOUNTER — APPOINTMENT (EMERGENCY)
Dept: CT IMAGING | Facility: HOSPITAL | Age: 49
End: 2024-06-16
Payer: COMMERCIAL

## 2024-06-16 ENCOUNTER — HOSPITAL ENCOUNTER (EMERGENCY)
Facility: HOSPITAL | Age: 49
Discharge: HOME | End: 2024-06-17
Attending: EMERGENCY MEDICINE
Payer: COMMERCIAL

## 2024-06-16 DIAGNOSIS — M54.6 THORACIC BACK PAIN: Primary | ICD-10-CM

## 2024-06-16 LAB
ALBUMIN SERPL BCP-MCNC: 4.2 G/DL (ref 3.5–5)
ALP SERPL-CCNC: 136 U/L (ref 34–104)
ALT SERPL W P-5'-P-CCNC: 21 U/L (ref 7–52)
ANION GAP SERPL CALCULATED.3IONS-SCNC: 8 MMOL/L (ref 4–13)
AST SERPL W P-5'-P-CCNC: 16 U/L (ref 13–39)
BACTERIA UR QL AUTO: NORMAL /HPF
BILIRUB SERPL-MCNC: 0.24 MG/DL (ref 0.2–1)
BILIRUB UR QL STRIP: NEGATIVE
BUN SERPL-MCNC: 17 MG/DL (ref 5–25)
CALCIUM SERPL-MCNC: 9.3 MG/DL (ref 8.4–10.2)
CHLORIDE SERPL-SCNC: 102 MMOL/L (ref 96–108)
CLARITY UR: CLEAR
CO2 SERPL-SCNC: 26 MMOL/L (ref 21–32)
COLOR UR: ABNORMAL
CREAT SERPL-MCNC: 1.16 MG/DL (ref 0.6–1.3)
D DIMER PPP FEU-MCNC: 0.28 UG/ML FEU
ERYTHROCYTE [DISTWIDTH] IN BLOOD BY AUTOMATED COUNT: 14.2 % (ref 11.6–15.1)
GFR SERPL CREATININE-BSD FRML MDRD: 73 ML/MIN/1.73SQ M
GLUCOSE SERPL-MCNC: 126 MG/DL (ref 65–140)
GLUCOSE UR STRIP-MCNC: NEGATIVE MG/DL
HCT VFR BLD AUTO: 46.7 % (ref 36.5–49.3)
HGB BLD-MCNC: 15.8 G/DL (ref 12–17)
HGB UR QL STRIP.AUTO: ABNORMAL
KETONES UR STRIP-MCNC: NEGATIVE MG/DL
LEUKOCYTE ESTERASE UR QL STRIP: NEGATIVE
MCH RBC QN AUTO: 31.9 PG (ref 26.8–34.3)
MCHC RBC AUTO-ENTMCNC: 33.8 G/DL (ref 31.4–37.4)
MCV RBC AUTO: 94 FL (ref 82–98)
NITRITE UR QL STRIP: NEGATIVE
NON-SQ EPI CELLS URNS QL MICRO: NORMAL /HPF
PH UR STRIP.AUTO: 6 [PH]
PLATELET # BLD AUTO: 308 THOUSANDS/UL (ref 149–390)
PMV BLD AUTO: 10 FL (ref 8.9–12.7)
POTASSIUM SERPL-SCNC: 3.7 MMOL/L (ref 3.5–5.3)
PROT SERPL-MCNC: 7.5 G/DL (ref 6.4–8.4)
PROT UR STRIP-MCNC: NEGATIVE MG/DL
RBC # BLD AUTO: 4.96 MILLION/UL (ref 3.88–5.62)
RBC #/AREA URNS AUTO: NORMAL /HPF
SODIUM SERPL-SCNC: 136 MMOL/L (ref 135–147)
SP GR UR STRIP.AUTO: 1.01 (ref 1–1.03)
UROBILINOGEN UR STRIP-ACNC: <2 MG/DL
WBC # BLD AUTO: 13.54 THOUSAND/UL (ref 4.31–10.16)
WBC #/AREA URNS AUTO: NORMAL /HPF

## 2024-06-16 PROCEDURE — 99285 EMERGENCY DEPT VISIT HI MDM: CPT | Performed by: EMERGENCY MEDICINE

## 2024-06-16 PROCEDURE — 36415 COLL VENOUS BLD VENIPUNCTURE: CPT

## 2024-06-16 PROCEDURE — 81001 URINALYSIS AUTO W/SCOPE: CPT

## 2024-06-16 PROCEDURE — 93005 ELECTROCARDIOGRAM TRACING: CPT

## 2024-06-16 PROCEDURE — 85027 COMPLETE CBC AUTOMATED: CPT

## 2024-06-16 PROCEDURE — 85007 BL SMEAR W/DIFF WBC COUNT: CPT

## 2024-06-16 PROCEDURE — 99284 EMERGENCY DEPT VISIT MOD MDM: CPT

## 2024-06-16 PROCEDURE — 71250 CT THORAX DX C-: CPT

## 2024-06-16 PROCEDURE — 80053 COMPREHEN METABOLIC PANEL: CPT

## 2024-06-16 PROCEDURE — 85379 FIBRIN DEGRADATION QUANT: CPT

## 2024-06-16 RX ORDER — METHOCARBAMOL 500 MG/1
500 TABLET, FILM COATED ORAL ONCE
Status: COMPLETED | OUTPATIENT
Start: 2024-06-16 | End: 2024-06-16

## 2024-06-16 RX ADMIN — METHOCARBAMOL 500 MG: 500 TABLET ORAL at 22:08

## 2024-06-17 VITALS
WEIGHT: 218.03 LBS | RESPIRATION RATE: 16 BRPM | HEART RATE: 68 BPM | OXYGEN SATURATION: 95 % | DIASTOLIC BLOOD PRESSURE: 90 MMHG | SYSTOLIC BLOOD PRESSURE: 131 MMHG | TEMPERATURE: 97.7 F | BODY MASS INDEX: 31.28 KG/M2

## 2024-06-17 LAB
ATRIAL RATE: 84 BPM
BASOPHILS # BLD MANUAL: 0.14 THOUSAND/UL (ref 0–0.1)
BASOPHILS NFR MAR MANUAL: 1 % (ref 0–1)
EOSINOPHIL # BLD MANUAL: 0.54 THOUSAND/UL (ref 0–0.4)
EOSINOPHIL NFR BLD MANUAL: 4 % (ref 0–6)
LYMPHOCYTES # BLD AUTO: 34 % (ref 14–44)
LYMPHOCYTES # BLD AUTO: 4.6 THOUSAND/UL (ref 0.6–4.47)
MONOCYTES # BLD AUTO: 1.08 THOUSAND/UL (ref 0–1.22)
MONOCYTES NFR BLD: 8 % (ref 4–12)
NEUTROPHILS # BLD MANUAL: 7.18 THOUSAND/UL (ref 1.85–7.62)
NEUTS SEG NFR BLD AUTO: 53 % (ref 43–75)
P AXIS: 54 DEGREES
PLATELET BLD QL SMEAR: ADEQUATE
PR INTERVAL: 142 MS
QRS AXIS: 52 DEGREES
QRSD INTERVAL: 88 MS
QT INTERVAL: 358 MS
QTC INTERVAL: 423 MS
RBC MORPH BLD: NORMAL
T WAVE AXIS: 65 DEGREES
VENTRICULAR RATE: 84 BPM

## 2024-06-17 PROCEDURE — 93010 ELECTROCARDIOGRAM REPORT: CPT | Performed by: INTERNAL MEDICINE

## 2024-06-17 RX ORDER — OXYCODONE HYDROCHLORIDE 5 MG/1
5 TABLET ORAL EVERY 6 HOURS PRN
Qty: 10 TABLET | Refills: 0 | Status: SHIPPED | OUTPATIENT
Start: 2024-06-17 | End: 2024-06-27

## 2024-06-17 RX ORDER — METHOCARBAMOL 500 MG/1
500 TABLET, FILM COATED ORAL 2 TIMES DAILY
Qty: 20 TABLET | Refills: 0 | Status: SHIPPED | OUTPATIENT
Start: 2024-06-17

## 2024-06-17 RX ORDER — ACETAMINOPHEN 500 MG
1000 TABLET ORAL EVERY 8 HOURS
Qty: 40 TABLET | Refills: 0 | Status: SHIPPED | OUTPATIENT
Start: 2024-06-17

## 2024-06-17 RX ORDER — IBUPROFEN 600 MG/1
600 TABLET ORAL EVERY 6 HOURS PRN
Qty: 30 TABLET | Refills: 0 | Status: SHIPPED | OUTPATIENT
Start: 2024-06-17

## 2024-06-17 NOTE — DISCHARGE INSTRUCTIONS
Today I provided prescription for Tylenol, ibuprofen, Robaxin, oxycodone.  Use the Tylenol, ibuprofen, Robaxin first. If further pain control needed then consider taking one of the oxycodone.  Do not drive after taking oxycodone.  Oxycodone is a addictive substance.  Take with caution.  Follow-up with the comprehensive spine program for further evaluation and treatment of back pain.  Return to ED for new or worsening symptoms.

## 2024-06-17 NOTE — ED PROVIDER NOTES
History  Chief Complaint   Patient presents with    Back Pain     Pt states left sided back pain that has gotten increasingly worse. Pt states a constant sharp pain and and increasing pain upon inspiration.     49-year-old male with history of COPD, MI, DVT presents to ED for evaluation of left-sided thoracic back pain.  Patient states that a week and a half ago he started having right-sided thoracic back pain.  He was evaluated this past Wednesday at [place] ED. no acute pathology was found on workup.  He suspected a urinary stone at that time however workup did not suggest a urinary stone.  For most of the past week and a half the pain was on the right side however yesterday it started occurring severely on the left side.  Patient has pain with deep inspiration.  Denies hemoptysis, recent plane travel, or recent long car rides, recent surgery.  Denies calf pain.  Denies chest pain, fever, chills, nausea, vomiting, abdominal pain, dysuria, hematuria, increased urinary frequency, seizure, syncope.  The pain does radiate into his groin.  Previously when he had urinary stones he has had similar symptoms.  Denies any recent falls.  Denies recent change in activity.  Has taken ibuprofen without any help in reducing pain.  Former smoker.  Has been many years since he has smoked.  No history of PE however he did have a traumatic DVT in 2009 after a crush injury of his left leg.         Prior to Admission Medications   Prescriptions Last Dose Informant Patient Reported? Taking?   Clenpiq 10-3.5-12 MG-GM -GM/160ML SOLN   Yes No   Sig: TAKE 320 ML AS DIRECTED-FOLLOW INSTRUCTIONS PROVIDED BY OFFICE   Patient not taking: Reported on 6/3/2024   aspirin 81 mg chewable tablet  Self No No   Sig: Chew 1 tablet (81 mg total) daily   atorvastatin (LIPITOR) 40 mg tablet  Self No No   Sig: Take 1 tablet (40 mg total) by mouth every evening   Patient not taking: Reported on 3/23/2022    azelastine (ASTELIN) 0.1 % nasal spray  Self No No  "  Si spray into each nostril 2 (two) times a day   Patient not taking: Reported on 6/3/2024   fluconazole (DIFLUCAN) 150 mg tablet   No No   Sig: Take 1 tablet (150 mg total) by mouth daily for 14 doses   methocarbamol (ROBAXIN) 500 mg tablet   No No   Sig: Take 1 tablet (500 mg total) by mouth 3 (three) times a day as needed for muscle spasms   Patient not taking: Reported on 6/3/2024   nortriptyline (PAMELOR) 10 mg capsule   No No   Sig: Take 1 capsule nightly for 1 week and increase to 2 capsules nightly after that.   Patient not taking: Reported on 6/3/2024      Facility-Administered Medications: None       Past Medical History:   Diagnosis Date    COPD (chronic obstructive pulmonary disease) ([facility])     DVT of lower limb, acute ([facility])     MI (myocardial infarction) ([facility])        Past Surgical History:   Procedure Laterality Date    KNEE SURGERY         Family History   Problem Relation Age of Onset    Hypertension Mother     Heart attack Father 42            Heart failure Paternal Aunt         pacemaker    Heart disease Paternal Uncle     Heart failure Paternal Grandmother         pacemaker    Heart attack Paternal Grandfather         cabg    Heart disease Paternal Grandfather 62        heart transplant     I have reviewed and agree with the history as documented.    E-Cigarette/Vaping    E-Cigarette Use Never User      E-Cigarette/Vaping Substances    Nicotine No     THC No     CBD No     Flavoring No     Other No     Unknown No      Social History     Tobacco Use    Smoking status: Former     Current packs/day: 0.00     Average packs/day: 0.5 packs/day for 24.0 years (12.0 ttl pk-yrs)     Types: Cigarettes     Start date:      Quit date: 2018     Years since quittin.4    Smokeless tobacco: Current    Tobacco comments:     \"here and there a cigerette\"   Vaping Use    Vaping status: Never Used   Substance Use Topics    Alcohol use: Not Currently    Drug use: Not Currently       Review of " Systems   Musculoskeletal:  Positive for back pain.   All other systems reviewed and are negative.      Physical Exam  Physical Exam  Vitals and nursing note reviewed.   Constitutional:       General: He is not in acute distress.     Appearance: He is well-developed. He is ill-appearing. He is not toxic-appearing or diaphoretic.      Comments: Appears very uncomfortable in bed.  Winces with deep inspiration.   HENT:      Head: Normocephalic and atraumatic.   Eyes:      Extraocular Movements: Extraocular movements intact.      Conjunctiva/sclera: Conjunctivae normal.      Pupils: Pupils are equal, round, and reactive to light.   Cardiovascular:      Rate and Rhythm: Normal rate and regular rhythm.      Pulses: Normal pulses.      Heart sounds: Normal heart sounds. No murmur heard.     No friction rub. No gallop.   Pulmonary:      Effort: Pulmonary effort is normal. No respiratory distress.      Breath sounds: Normal breath sounds. No stridor. No wheezing, rhonchi or rales.   Abdominal:      Palpations: Abdomen is soft.      Tenderness: There is no abdominal tenderness.   Musculoskeletal:         General: No swelling.      Cervical back: Normal and neck supple.      Thoracic back: Tenderness and bony tenderness present. No swelling, edema, deformity, signs of trauma or lacerations.      Lumbar back: Normal.        Back:    Skin:     General: Skin is warm and dry.      Capillary Refill: Capillary refill takes less than 2 seconds.      Coloration: Skin is not jaundiced or pale.      Findings: No rash.   Neurological:      General: No focal deficit present.      Mental Status: He is alert and oriented to person, place, and time. Mental status is at baseline.   Psychiatric:         Mood and Affect: Mood normal.         Vital Signs  ED Triage Vitals [06/16/24 2126]   Temperature Pulse Respirations Blood Pressure SpO2   97.7 °F (36.5 °C) 90 18 (!) 177/102 97 %      Temp Source Heart Rate Source Patient Position -  Orthostatic VS BP Location FiO2 (%)   Oral Monitor Lying Right arm --      Pain Score       --           Vitals:    06/16/24 2145 06/16/24 2230 06/16/24 2245 06/17/24 0030   BP: 150/100 148/89 142/87 131/90   Pulse: 83 70 74 68   Patient Position - Orthostatic VS: Lying Sitting Sitting Sitting         Visual Acuity      ED Medications  Medications   methocarbamol (ROBAXIN) tablet 500 mg (500 mg Oral Given 6/16/24 2208)       Diagnostic Studies  Results Reviewed       Procedure Component Value Units Date/Time    Manual Differential(PHLEBS Do Not Order) [[id number]]  (Abnormal) Collected: 06/16/24 2208    Lab Status: Final result Specimen: Blood from Arm, Left Updated: 06/17/24 0211     Segmented % 53 %      Lymphocytes % 34 %      Monocytes % 8 %      Eosinophils % 4 %      Basophils % 1 %      Absolute Neutrophils 7.18 Thousand/uL      Absolute Lymphocytes 4.60 Thousand/uL      Absolute Monocytes 1.08 Thousand/uL      Absolute Eosinophils 0.54 Thousand/uL      Absolute Basophils 0.14 Thousand/uL      Total Counted --     RBC Morphology Normal     Platelet Estimate Adequate    Comprehensive metabolic panel [[id number]]  (Abnormal) Collected: 06/16/24 2208    Lab Status: Final result Specimen: Blood from Arm, Left Updated: 06/16/24 2235     Sodium 136 mmol/L      Potassium 3.7 mmol/L      Chloride 102 mmol/L      CO2 26 mmol/L      ANION GAP 8 mmol/L      BUN 17 mg/dL      Creatinine 1.16 mg/dL      Glucose 126 mg/dL      Calcium 9.3 mg/dL      AST 16 U/L      ALT 21 U/L      Alkaline Phosphatase 136 U/L      Total Protein 7.5 g/dL      Albumin 4.2 g/dL      Total Bilirubin 0.24 mg/dL      eGFR 73 ml/min/1.73sq m     Narrative:      National Kidney Disease Foundation guidelines for Chronic Kidney Disease (CKD):     Stage 1 with normal or high GFR (GFR > 90 mL/min/1.73 square meters)    Stage 2 Mild CKD (GFR = 60-89 mL/min/1.73 square meters)    Stage 3A Moderate CKD (GFR = 45-59 mL/min/1.73 square meters)    Stage 3B  Moderate CKD (GFR = 30-44 mL/min/1.73 square meters)    Stage 4 Severe CKD (GFR = 15-29 mL/min/1.73 square meters)    Stage 5 End Stage CKD (GFR <15 mL/min/1.73 square meters)  Note: GFR calculation is accurate only with a steady state creatinine    D-Dimer [[id number]]  (Normal) Collected: 06/16/24 2208    Lab Status: Final result Specimen: Blood from Arm, Left Updated: 06/16/24 2233     D-Dimer, Quant 0.28 ug/ml FEU     Narrative:      In the evaluation for possible pulmonary embolism, in the appropriate (Well's Score of 4 or less) patient, the age adjusted d-dimer cutoff for this patient can be calculated as:    Age x 0.01 (in ug/mL) for Age-adjusted D-dimer exclusion threshold for a patient over 50 years.    Urine Microscopic [[id number]]  (Normal) Collected: 06/16/24 2208    Lab Status: Final result Specimen: Urine, Clean Catch Updated: 06/16/24 2228     RBC, UA 1-2 /hpf      WBC, UA None Seen /hpf      Epithelial Cells None Seen /hpf      Bacteria, UA None Seen /hpf     UA w Reflex to Microscopic w Reflex to Culture [[id number]]  (Abnormal) Collected: 06/16/24 2208    Lab Status: Final result Specimen: Urine, Clean Catch Updated: 06/16/24 2227     Color, UA Light Yellow     Clarity, UA Clear     Specific Gravity, UA 1.013     pH, UA 6.0     Leukocytes, UA Negative     Nitrite, UA Negative     Protein, UA Negative mg/dl      Glucose, UA Negative mg/dl      Ketones, UA Negative mg/dl      Urobilinogen, UA <2.0 mg/dl      Bilirubin, UA Negative     Occult Blood, UA Trace    CBC and differential [[id number]]  (Abnormal) Collected: 06/16/24 2208    Lab Status: Final result Specimen: Blood from Arm, Left Updated: 06/16/24 2219     WBC 13.54 Thousand/uL      RBC 4.96 Million/uL      Hemoglobin 15.8 g/dL      Hematocrit 46.7 %      MCV 94 fL      MCH 31.9 pg      MCHC 33.8 g/dL      RDW 14.2 %      MPV 10.0 fL      Platelets 308 Thousands/uL                    CT chest without contrast   Final Result by Kash Gomez DO  (06/17 0059)      No acute abnormality identified      Emphysema.         Workstation performed: [id number]                    Procedures  Procedures         ED Course                               SBIRT 20yo+      Flowsheet Row Most Recent Value   Initial Alcohol Screen: US AUDIT-C     1. How often do you have a drink containing alcohol? 0 Filed at: 06/16/2024 2128   2. How many drinks containing alcohol do you have on a typical day you are drinking?  0 Filed at: 06/16/2024 2128   3a. Male UNDER 65: How often do you have five or more drinks on one occasion? 0 Filed at: 06/16/2024 2128   Audit-C Score 0 Filed at: 06/16/2024 2128   JOVAN: How many times in the past year have you...    Used an illegal drug or used a prescription medication for non-medical reasons? Never Filed at: 06/16/2024 2128            Wells' Criteria for PE      Flowsheet Row Most Recent Value   Wells' Criteria for PE    Clinical signs and symptoms of DVT 0 Filed at: 06/16/2024 2201   PE is primary diagnosis or equally likely 0 Filed at: 06/16/2024 2201   HR >100 0 Filed at: 06/16/2024 2201   Immobilization at least 3 days or Surgery in the previous 4 weeks 0 Filed at: 06/16/2024 2201   Previous, objectively diagnosed PE or DVT 1.5 Filed at: 06/16/2024 2201   Hemoptysis 0 Filed at: 06/16/2024 2201   Malignancy with treatment within 6 months or palliative 0 Filed at: 06/16/2024 2201   Wells' Criteria Total 1.5 Filed at: 06/16/2024 2201                  Medical Decision Making  Differential diagnosis includes but not limited to musculoskeletal back pain, nephrolithiasis, UTI, pyelonephritis, pneumonia, pneumothorax, tension pneumothorax, malignancy, PE    49-year-old male presents to ED for evaluation of left-sided thoracic back pain as above.  On physical examination patient vital signs stable.  Alert and responding questions appropriately.  Does appear to be uncomfortable secondary to back pain.  No murmur.  Normal breath sounds.  Extremities  well-perfused.  Tender to palpation of left thoracic paraspinal musculature.  Nontoxic-appearing.  Obtained workup consisting of CBC, CMP, UA, D-dimer, EKG, Noncon chest CT.  CBC returned with leukocytosis of 13.54.  CMP returned without concerning values.  Mildly elevated alk phos.  D-dimer WNL.  UA without evidence of UTI, urinary stone.  Noncontrast CT returned without acute abnormality.  Emphysema seen.  In the ED provided pain control with dose of Robaxin.  Holding off on narcotic pain medication given that patient is driving home.  Discussed results of workup with patient.  Explained that workup does not suggest urinary source of pain such as nephrolithiasis, pyelonephritis.  No evidence of pneumonia, pneumothorax, tension pneumothorax.  No rib fractures, vertebral fracture.  Given results of workup, suspect patient experiencing musculoskeletal back pain.  He is on the patient has leukocytosis however this may be due to musculoskeletal inflammation however cannot definitively determine this.  Plan to discharge patient with symptomatic relief with Tylenol, ibuprofen, Robaxin, small amount of oxycodone for severe pain.  Providing referral to the [facility] comprehensive spine program.  Advised to return to ED for new or worsening symptoms.  Patient agreed with plan.  Patient discharged.    Prior to discharge, discharge instructions were discussed with patient at bedside. Patient was provided both verbal and written instructions. Patient is understanding of the discharge instructions and is agreeable to plan of care. Return precautions were discussed with patient bedside, patient verbalized understanding of signs and symptoms that would necessitate return to the ED. All questions were answered. Patient was comfortable with the plan of care and discharged to home.     Amount and/or Complexity of Data Reviewed  Labs: ordered.  Radiology: ordered.    Risk  OTC drugs.  Prescription drug management.              Disposition  Final diagnoses:   Thoracic back pain     Time reflects when diagnosis was documented in both MDM as applicable and the Disposition within this note       Time User Action Codes Description Comment    6/16/2024 10:38 PM Johnnie Hernandez Add [M54.6] Thoracic back pain           ED Disposition       ED Disposition   Discharge    Condition   Stable    Date/Time   Sun Jun 16, 2024 [id number]    Comment   Benjamin W Kocher discharge to home/self care.                   Follow-up Information       Follow up With Specialties Details Why Contact Info Additional Information    Kirk Chakraborty MD Family Medicine   [address] [place] [address].  [address]  [place] [id number]  [phone]       [facility] Physical [facility]   [phone]            Discharge Medication List as of 6/17/2024  1:09 AM        START taking these medications    Details   acetaminophen (TYLENOL) 500 mg tablet Take 2 tablets (1,000 mg total) by mouth every 8 (eight) hours, Starting Mon 6/17/2024, Normal      ibuprofen (MOTRIN) 600 mg tablet Take 1 tablet (600 mg total) by mouth every 6 (six) hours as needed for mild pain, Starting Mon 6/17/2024, Normal      !! methocarbamol (ROBAXIN) 500 mg tablet Take 1 tablet (500 mg total) by mouth 2 (two) times a day, Starting Mon 6/17/2024, Normal      oxyCODONE (Roxicodone) 5 immediate release tablet Take 1 tablet (5 mg total) by mouth every 6 (six) hours as needed for severe pain for up to 10 days Max Daily Amount: 20 mg, Starting Mon 6/17/2024, Until Thu 6/27/2024 at 2359, Normal       !! - Potential duplicate medications found. Please discuss with provider.        CONTINUE these medications which have NOT CHANGED    Details   aspirin 81 mg chewable tablet Chew 1 tablet (81 mg total) daily, Starting Thu 3/10/2022, Normal      atorvastatin (LIPITOR) 40 mg tablet Take 1 tablet (40 mg total) by mouth every evening, Starting Wed 3/9/2022, Normal      azelastine (ASTELIN)  0.1 % nasal spray 1 spray into each nostril 2 (two) times a day, Starting Mon 11/15/2021, Normal      Clenpiq 10-3.5-12 MG-GM -GM/160ML SOLN TAKE 320 ML AS DIRECTED-FOLLOW INSTRUCTIONS PROVIDED BY OFFICE, Historical Med      fluconazole (DIFLUCAN) 150 mg tablet Take 1 tablet (150 mg total) by mouth daily for 14 doses, Starting Fri 6/7/2024, Until Fri 6/21/2024, Normal      !! methocarbamol (ROBAXIN) 500 mg tablet Take 1 tablet (500 mg total) by mouth 3 (three) times a day as needed for muscle spasms, Starting Thu 1/11/2024, Normal      nortriptyline (PAMELOR) 10 mg capsule Take 1 capsule nightly for 1 week and increase to 2 capsules nightly after that., Normal       !! - Potential duplicate medications found. Please discuss with provider.              PDMP Review       None            ED Provider  Electronically Signed by             Johnnie Hernandez PA-C  06/19/24 5433

## 2024-06-18 ENCOUNTER — TELEPHONE (OUTPATIENT)
Dept: PHYSICAL THERAPY | Facility: OTHER | Age: 49
End: 2024-06-18

## 2024-06-18 NOTE — TELEPHONE ENCOUNTER
"Call placed to the patient per [facility] referral.    Spoke with patient, explained program,protocol and reason for the call.    Patient declined services. Patient states \" I don't think that would help me with the spasm\".    [facility]  phone number and hours of business provided to the patient in case he'll need our services in the future.    [facility] referral closed per protocol.  "

## 2024-12-13 ENCOUNTER — HOSPITAL ENCOUNTER (EMERGENCY)
Facility: HOSPITAL | Age: 49
End: 2024-12-13
Attending: EMERGENCY MEDICINE
Payer: COMMERCIAL

## 2024-12-13 ENCOUNTER — APPOINTMENT (EMERGENCY)
Dept: RADIOLOGY | Facility: HOSPITAL | Age: 49
End: 2024-12-13
Payer: COMMERCIAL

## 2024-12-13 VITALS
HEIGHT: 70 IN | SYSTOLIC BLOOD PRESSURE: 137 MMHG | TEMPERATURE: 97.2 F | HEART RATE: 69 BPM | WEIGHT: 209 LBS | OXYGEN SATURATION: 100 % | BODY MASS INDEX: 29.92 KG/M2 | RESPIRATION RATE: 12 BRPM | DIASTOLIC BLOOD PRESSURE: 80 MMHG

## 2024-12-13 DIAGNOSIS — I21.3 STEMI (ST ELEVATION MYOCARDIAL INFARCTION) (HCC): Primary | ICD-10-CM

## 2024-12-13 LAB
ALBUMIN SERPL BCG-MCNC: 4.1 G/DL (ref 3.5–5)
ALP SERPL-CCNC: 108 U/L (ref 34–104)
ALT SERPL W P-5'-P-CCNC: 33 U/L (ref 7–52)
ANION GAP SERPL CALCULATED.3IONS-SCNC: 9 MMOL/L (ref 4–13)
APTT PPP: 23 SECONDS (ref 23–34)
AST SERPL W P-5'-P-CCNC: 23 U/L (ref 13–39)
BASOPHILS # BLD MANUAL: 0 THOUSAND/UL (ref 0–0.1)
BASOPHILS NFR MAR MANUAL: 0 % (ref 0–1)
BILIRUB SERPL-MCNC: 0.36 MG/DL (ref 0.2–1)
BUN SERPL-MCNC: 14 MG/DL (ref 5–25)
CALCIUM SERPL-MCNC: 9 MG/DL (ref 8.4–10.2)
CARDIAC TROPONIN I PNL SERPL HS: 5 NG/L (ref ?–50)
CHLORIDE SERPL-SCNC: 104 MMOL/L (ref 96–108)
CO2 SERPL-SCNC: 22 MMOL/L (ref 21–32)
CREAT SERPL-MCNC: 1.13 MG/DL (ref 0.6–1.3)
EOSINOPHIL # BLD MANUAL: 0.32 THOUSAND/UL (ref 0–0.4)
EOSINOPHIL NFR BLD MANUAL: 2 % (ref 0–6)
ERYTHROCYTE [DISTWIDTH] IN BLOOD BY AUTOMATED COUNT: 13.9 % (ref 11.6–15.1)
GFR SERPL CREATININE-BSD FRML MDRD: 75 ML/MIN/1.73SQ M
GLUCOSE SERPL-MCNC: 124 MG/DL (ref 65–140)
HCT VFR BLD AUTO: 41.8 % (ref 36.5–49.3)
HGB BLD-MCNC: 14.8 G/DL (ref 12–17)
INR PPP: 1.07 (ref 0.85–1.19)
LYMPHOCYTES # BLD AUTO: 36 % (ref 14–44)
LYMPHOCYTES # BLD AUTO: 6.27 THOUSAND/UL (ref 0.6–4.47)
MAGNESIUM SERPL-MCNC: 2 MG/DL (ref 1.9–2.7)
MCH RBC QN AUTO: 32.4 PG (ref 26.8–34.3)
MCHC RBC AUTO-ENTMCNC: 35.4 G/DL (ref 31.4–37.4)
MCV RBC AUTO: 92 FL (ref 82–98)
MONOCYTES # BLD AUTO: 1.29 THOUSAND/UL (ref 0–1.22)
MONOCYTES NFR BLD: 8 % (ref 4–12)
NEUTROPHILS # BLD MANUAL: 8.2 THOUSAND/UL (ref 1.85–7.62)
NEUTS SEG NFR BLD AUTO: 51 % (ref 43–75)
PLATELET # BLD AUTO: 291 THOUSANDS/UL (ref 149–390)
PLATELET BLD QL SMEAR: ADEQUATE
PMV BLD AUTO: 10 FL (ref 8.9–12.7)
POTASSIUM SERPL-SCNC: 3.6 MMOL/L (ref 3.5–5.3)
PROT SERPL-MCNC: 6.8 G/DL (ref 6.4–8.4)
PROTHROMBIN TIME: 14.4 SECONDS (ref 12.3–15)
RBC # BLD AUTO: 4.57 MILLION/UL (ref 3.88–5.62)
RBC MORPH BLD: NORMAL
SODIUM SERPL-SCNC: 135 MMOL/L (ref 135–147)
VARIANT LYMPHS # BLD AUTO: 3 %
WBC # BLD AUTO: 16.07 THOUSAND/UL (ref 4.31–10.16)

## 2024-12-13 PROCEDURE — 96374 THER/PROPH/DIAG INJ IV PUSH: CPT

## 2024-12-13 PROCEDURE — 4A023N7 MEASUREMENT OF CARDIAC SAMPLING AND PRESSURE, LEFT HEART, PERCUTANEOUS APPROACH: ICD-10-PCS | Performed by: INTERNAL MEDICINE

## 2024-12-13 PROCEDURE — 36415 COLL VENOUS BLD VENIPUNCTURE: CPT | Performed by: EMERGENCY MEDICINE

## 2024-12-13 PROCEDURE — 84439 ASSAY OF FREE THYROXINE: CPT

## 2024-12-13 PROCEDURE — 99285 EMERGENCY DEPT VISIT HI MDM: CPT

## 2024-12-13 PROCEDURE — 96375 TX/PRO/DX INJ NEW DRUG ADDON: CPT

## 2024-12-13 PROCEDURE — B2111ZZ FLUOROSCOPY OF MULTIPLE CORONARY ARTERIES USING LOW OSMOLAR CONTRAST: ICD-10-PCS | Performed by: INTERNAL MEDICINE

## 2024-12-13 PROCEDURE — 80053 COMPREHEN METABOLIC PANEL: CPT | Performed by: EMERGENCY MEDICINE

## 2024-12-13 PROCEDURE — 85007 BL SMEAR W/DIFF WBC COUNT: CPT | Performed by: EMERGENCY MEDICINE

## 2024-12-13 PROCEDURE — 80061 LIPID PANEL: CPT

## 2024-12-13 PROCEDURE — 83735 ASSAY OF MAGNESIUM: CPT | Performed by: EMERGENCY MEDICINE

## 2024-12-13 PROCEDURE — 71045 X-RAY EXAM CHEST 1 VIEW: CPT

## 2024-12-13 PROCEDURE — B2151ZZ FLUOROSCOPY OF LEFT HEART USING LOW OSMOLAR CONTRAST: ICD-10-PCS | Performed by: INTERNAL MEDICINE

## 2024-12-13 PROCEDURE — 027137Z DILATION OF CORONARY ARTERY, TWO ARTERIES WITH FOUR OR MORE DRUG-ELUTING INTRALUMINAL DEVICES, PERCUTANEOUS APPROACH: ICD-10-PCS | Performed by: INTERNAL MEDICINE

## 2024-12-13 PROCEDURE — 93005 ELECTROCARDIOGRAM TRACING: CPT

## 2024-12-13 PROCEDURE — 85610 PROTHROMBIN TIME: CPT | Performed by: EMERGENCY MEDICINE

## 2024-12-13 PROCEDURE — 85027 COMPLETE CBC AUTOMATED: CPT | Performed by: EMERGENCY MEDICINE

## 2024-12-13 PROCEDURE — 83036 HEMOGLOBIN GLYCOSYLATED A1C: CPT

## 2024-12-13 PROCEDURE — 85730 THROMBOPLASTIN TIME PARTIAL: CPT | Performed by: EMERGENCY MEDICINE

## 2024-12-13 PROCEDURE — 96365 THER/PROPH/DIAG IV INF INIT: CPT

## 2024-12-13 PROCEDURE — 99291 CRITICAL CARE FIRST HOUR: CPT | Performed by: EMERGENCY MEDICINE

## 2024-12-13 PROCEDURE — 84443 ASSAY THYROID STIM HORMONE: CPT

## 2024-12-13 PROCEDURE — 84484 ASSAY OF TROPONIN QUANT: CPT | Performed by: EMERGENCY MEDICINE

## 2024-12-13 RX ORDER — ASPIRIN 81 MG/1
324 TABLET, CHEWABLE ORAL ONCE
Status: COMPLETED | OUTPATIENT
Start: 2024-12-13 | End: 2024-12-13

## 2024-12-13 RX ORDER — MORPHINE SULFATE 4 MG/ML
4 INJECTION, SOLUTION INTRAMUSCULAR; INTRAVENOUS ONCE
Status: COMPLETED | OUTPATIENT
Start: 2024-12-13 | End: 2024-12-13

## 2024-12-13 RX ORDER — ONDANSETRON 2 MG/ML
4 INJECTION INTRAMUSCULAR; INTRAVENOUS ONCE
Status: COMPLETED | OUTPATIENT
Start: 2024-12-13 | End: 2024-12-13

## 2024-12-13 RX ORDER — SODIUM CHLORIDE 9 MG/ML
3 INJECTION INTRAVENOUS
Status: DISCONTINUED | OUTPATIENT
Start: 2024-12-13 | End: 2024-12-14 | Stop reason: HOSPADM

## 2024-12-13 RX ORDER — HEPARIN SODIUM 10000 [USP'U]/100ML
INJECTION, SOLUTION INTRAVENOUS
Status: COMPLETED
Start: 2024-12-13 | End: 2024-12-13

## 2024-12-13 RX ORDER — HEPARIN SODIUM 1000 [USP'U]/ML
4000 INJECTION, SOLUTION INTRAVENOUS; SUBCUTANEOUS ONCE
Status: COMPLETED | OUTPATIENT
Start: 2024-12-13 | End: 2024-12-13

## 2024-12-13 RX ORDER — HEPARIN SODIUM 10000 [USP'U]/100ML
3-20 INJECTION, SOLUTION INTRAVENOUS
Status: DISCONTINUED | OUTPATIENT
Start: 2024-12-13 | End: 2024-12-14 | Stop reason: HOSPADM

## 2024-12-13 RX ORDER — HEPARIN SODIUM 1000 [USP'U]/ML
4000 INJECTION, SOLUTION INTRAVENOUS; SUBCUTANEOUS EVERY 6 HOURS PRN
Status: DISCONTINUED | OUTPATIENT
Start: 2024-12-13 | End: 2024-12-14 | Stop reason: HOSPADM

## 2024-12-13 RX ORDER — HEPARIN SODIUM 1000 [USP'U]/ML
2000 INJECTION, SOLUTION INTRAVENOUS; SUBCUTANEOUS EVERY 6 HOURS PRN
Status: DISCONTINUED | OUTPATIENT
Start: 2024-12-13 | End: 2024-12-14 | Stop reason: HOSPADM

## 2024-12-13 RX ADMIN — MORPHINE SULFATE 4 MG: 4 INJECTION, SOLUTION INTRAMUSCULAR; INTRAVENOUS at 23:14

## 2024-12-13 RX ADMIN — MORPHINE SULFATE 4 MG: 4 INJECTION, SOLUTION INTRAMUSCULAR; INTRAVENOUS at 23:07

## 2024-12-13 RX ADMIN — HEPARIN SODIUM 25000 UNITS: 10000 INJECTION, SOLUTION INTRAVENOUS at 23:23

## 2024-12-13 RX ADMIN — ASPIRIN 81 MG 324 MG: 81 TABLET ORAL at 23:07

## 2024-12-13 RX ADMIN — HEPARIN SODIUM 4000 UNITS: 1000 INJECTION INTRAVENOUS; SUBCUTANEOUS at 23:15

## 2024-12-13 RX ADMIN — ONDANSETRON 4 MG: 2 INJECTION INTRAMUSCULAR; INTRAVENOUS at 23:04

## 2024-12-13 RX ADMIN — MORPHINE SULFATE 4 MG: 4 INJECTION, SOLUTION INTRAMUSCULAR; INTRAVENOUS at 23:35

## 2024-12-13 RX ADMIN — TICAGRELOR 180 MG: 90 TABLET ORAL at 23:14

## 2024-12-14 ENCOUNTER — HOSPITAL ENCOUNTER (INPATIENT)
Facility: HOSPITAL | Age: 49
LOS: 2 days | Discharge: HOME | DRG: 321 | End: 2024-12-16
Attending: INTERNAL MEDICINE | Admitting: INTERNAL MEDICINE
Payer: COMMERCIAL

## 2024-12-14 ENCOUNTER — APPOINTMENT (INPATIENT)
Dept: NON INVASIVE DIAGNOSTICS | Facility: HOSPITAL | Age: 49
DRG: 321 | End: 2024-12-14
Payer: COMMERCIAL

## 2024-12-14 DIAGNOSIS — I50.22 HEART FAILURE WITH MID-RANGE EJECTION FRACTION (HCC): ICD-10-CM

## 2024-12-14 DIAGNOSIS — I21.3 STEMI (ST ELEVATION MYOCARDIAL INFARCTION) (HCC): Primary | ICD-10-CM

## 2024-12-14 DIAGNOSIS — R06.09 EXERTIONAL DYSPNEA: ICD-10-CM

## 2024-12-14 DIAGNOSIS — M54.9 BACK PAIN: ICD-10-CM

## 2024-12-14 DIAGNOSIS — I21.02 ST ELEVATION MYOCARDIAL INFARCTION INVOLVING LEFT ANTERIOR DESCENDING (LAD) CORONARY ARTERY (HCC): Primary | ICD-10-CM

## 2024-12-14 PROBLEM — R73.03 PREDIABETES: Status: ACTIVE | Noted: 2024-12-14

## 2024-12-14 LAB
2HR DELTA HS TROPONIN: 5272 NG/L
ALBUMIN SERPL BCG-MCNC: 3.3 G/DL (ref 3.5–5)
ALP SERPL-CCNC: 95 U/L (ref 34–104)
ALT SERPL W P-5'-P-CCNC: 29 U/L (ref 7–52)
ANION GAP SERPL CALCULATED.3IONS-SCNC: 6 MMOL/L (ref 4–13)
AORTIC ROOT: 3 CM
APICAL FOUR CHAMBER EJECTION FRACTION: 52 %
ASCENDING AORTA: 3.8 CM
AST SERPL W P-5'-P-CCNC: 29 U/L (ref 13–39)
ATRIAL RATE: 74 BPM
ATRIAL RATE: 81 BPM
BASOPHILS # BLD AUTO: 0.06 THOUSANDS/ÂΜL (ref 0–0.1)
BASOPHILS NFR BLD AUTO: 0 % (ref 0–1)
BILIRUB SERPL-MCNC: 0.37 MG/DL (ref 0.2–1)
BSA FOR ECHO PROCEDURE: 2.14 M2
BUN SERPL-MCNC: 13 MG/DL (ref 5–25)
CALCIUM ALBUM COR SERPL-MCNC: 8.1 MG/DL (ref 8.3–10.1)
CALCIUM SERPL-MCNC: 7.5 MG/DL (ref 8.4–10.2)
CARDIAC TROPONIN I PNL SERPL HS: 6113 NG/L (ref ?–50)
CARDIAC TROPONIN I PNL SERPL HS: 841 NG/L (ref ?–50)
CHLORIDE SERPL-SCNC: 105 MMOL/L (ref 96–108)
CHOLEST SERPL-MCNC: 185 MG/DL (ref ?–200)
CO2 SERPL-SCNC: 23 MMOL/L (ref 21–32)
CREAT SERPL-MCNC: 0.91 MG/DL (ref 0.6–1.3)
E WAVE DECELERATION TIME: 126 MS
E/A RATIO: 1.62
EOSINOPHIL # BLD AUTO: 0.12 THOUSAND/ÂΜL (ref 0–0.61)
EOSINOPHIL NFR BLD AUTO: 1 % (ref 0–6)
ERYTHROCYTE [DISTWIDTH] IN BLOOD BY AUTOMATED COUNT: 14.5 % (ref 11.6–15.1)
EST. AVERAGE GLUCOSE BLD GHB EST-MCNC: 128 MG/DL
FRACTIONAL SHORTENING: 39 (ref 28–44)
GFR SERPL CREATININE-BSD FRML MDRD: 97 ML/MIN/1.73SQ M
GLUCOSE SERPL-MCNC: 118 MG/DL (ref 65–140)
HBA1C MFR BLD: 6.1 %
HCT VFR BLD AUTO: 37.9 % (ref 36.5–49.3)
HDLC SERPL-MCNC: 43 MG/DL
HGB BLD-MCNC: 13 G/DL (ref 12–17)
IMM GRANULOCYTES # BLD AUTO: 0.08 THOUSAND/UL (ref 0–0.2)
IMM GRANULOCYTES NFR BLD AUTO: 1 % (ref 0–2)
INTERVENTRICULAR SEPTUM IN DIASTOLE (PARASTERNAL SHORT AXIS VIEW): 1.1 CM
INTERVENTRICULAR SEPTUM: 1.1 CM (ref 0.6–1.1)
KCT BLD-ACNC: 231 SEC (ref 89–137)
KCT BLD-ACNC: 237 SEC (ref 89–137)
LAAS-AP2: 24.8 CM2
LAAS-AP4: 23.4 CM2
LDLC SERPL CALC-MCNC: 115 MG/DL (ref 0–100)
LEFT ATRIUM SIZE: 4.3 CM
LEFT ATRIUM VOLUME (MOD BIPLANE): 76 ML
LEFT ATRIUM VOLUME INDEX (MOD BIPLANE): 35.5 ML/M2
LEFT INTERNAL DIMENSION IN SYSTOLE: 3.1 CM (ref 2.1–4)
LEFT VENTRICLE DIASTOLIC VOLUME (MOD BIPLANE): 119 ML
LEFT VENTRICLE DIASTOLIC VOLUME INDEX (MOD BIPLANE): 55.6 ML/M2
LEFT VENTRICLE SYSTOLIC VOLUME (MOD BIPLANE): 54 ML
LEFT VENTRICLE SYSTOLIC VOLUME INDEX (MOD BIPLANE): 25.2 ML/M2
LEFT VENTRICULAR INTERNAL DIMENSION IN DIASTOLE: 5.1 CM (ref 3.5–6)
LEFT VENTRICULAR POSTERIOR WALL IN END DIASTOLE: 1.2 CM
LEFT VENTRICULAR STROKE VOLUME: 86 ML
LV EF: 55 %
LVSV (TEICH): 86 ML
LYMPHOCYTES # BLD AUTO: 3.42 THOUSANDS/ÂΜL (ref 0.6–4.47)
LYMPHOCYTES NFR BLD AUTO: 20 % (ref 14–44)
MAGNESIUM SERPL-MCNC: 1.9 MG/DL (ref 1.9–2.7)
MCH RBC QN AUTO: 32.1 PG (ref 26.8–34.3)
MCHC RBC AUTO-ENTMCNC: 34.3 G/DL (ref 31.4–37.4)
MCV RBC AUTO: 94 FL (ref 82–98)
MONOCYTES # BLD AUTO: 0.97 THOUSAND/ÂΜL (ref 0.17–1.22)
MONOCYTES NFR BLD AUTO: 6 % (ref 4–12)
MV E'TISSUE VEL-SEP: 8 CM/S
MV PEAK A VEL: 0.34 M/S
MV PEAK E VEL: 55 CM/S
MV STENOSIS PRESSURE HALF TIME: 37 MS
MV VALVE AREA P 1/2 METHOD: 5.95
NEUTROPHILS # BLD AUTO: 12.28 THOUSANDS/ÂΜL (ref 1.85–7.62)
NEUTS SEG NFR BLD AUTO: 72 % (ref 43–75)
NRBC BLD AUTO-RTO: 0 /100 WBCS
P AXIS: 59 DEGREES
P AXIS: 64 DEGREES
PLATELET # BLD AUTO: 237 THOUSANDS/UL (ref 149–390)
PLATELET # BLD AUTO: 263 THOUSANDS/UL (ref 149–390)
PMV BLD AUTO: 10 FL (ref 8.9–12.7)
PMV BLD AUTO: 9.8 FL (ref 8.9–12.7)
POTASSIUM SERPL-SCNC: 4 MMOL/L (ref 3.5–5.3)
PR INTERVAL: 136 MS
PR INTERVAL: 146 MS
PROT SERPL-MCNC: 5.8 G/DL (ref 6.4–8.4)
QRS AXIS: 58 DEGREES
QRS AXIS: 62 DEGREES
QRSD INTERVAL: 86 MS
QRSD INTERVAL: 88 MS
QT INTERVAL: 366 MS
QT INTERVAL: 390 MS
QTC INTERVAL: 425 MS
QTC INTERVAL: 432 MS
RBC # BLD AUTO: 4.05 MILLION/UL (ref 3.88–5.62)
RIGHT ATRIUM AREA SYSTOLE A4C: 17.6 CM2
RIGHT VENTRICLE ID DIMENSION: 4.2 CM
SL CV LEFT ATRIUM LENGTH A2C: 6.1 CM
SL CV LV EF: 45
SL CV PED ECHO LEFT VENTRICLE DIASTOLIC VOLUME (MOD BIPLANE) 2D: 124 ML
SL CV PED ECHO LEFT VENTRICLE SYSTOLIC VOLUME (MOD BIPLANE) 2D: 38 ML
SODIUM SERPL-SCNC: 134 MMOL/L (ref 135–147)
SPECIMEN SOURCE: ABNORMAL
SPECIMEN SOURCE: ABNORMAL
T WAVE AXIS: 10 DEGREES
T WAVE AXIS: 13 DEGREES
T4 FREE SERPL-MCNC: 0.9 NG/DL (ref 0.61–1.12)
TRICUSPID ANNULAR PLANE SYSTOLIC EXCURSION: 2.2 CM
TRIGL SERPL-MCNC: 135 MG/DL (ref ?–150)
TSH SERPL DL<=0.05 MIU/L-ACNC: 4.54 UIU/ML (ref 0.45–4.5)
VENTRICULAR RATE: 74 BPM
VENTRICULAR RATE: 81 BPM
WBC # BLD AUTO: 16.93 THOUSAND/UL (ref 4.31–10.16)

## 2024-12-14 PROCEDURE — 92941 PRQ TRLML REVSC TOT OCCL AMI: CPT | Performed by: INTERNAL MEDICINE

## 2024-12-14 PROCEDURE — C1769 GUIDE WIRE: HCPCS | Performed by: INTERNAL MEDICINE

## 2024-12-14 PROCEDURE — C1887 CATHETER, GUIDING: HCPCS | Performed by: INTERNAL MEDICINE

## 2024-12-14 PROCEDURE — 85025 COMPLETE CBC W/AUTO DIFF WBC: CPT | Performed by: INTERNAL MEDICINE

## 2024-12-14 PROCEDURE — C1874 STENT, COATED/COV W/DEL SYS: HCPCS | Performed by: INTERNAL MEDICINE

## 2024-12-14 PROCEDURE — C9606 PERC D-E COR REVASC W AMI S: HCPCS | Performed by: INTERNAL MEDICINE

## 2024-12-14 PROCEDURE — NC001 PR NO CHARGE: Performed by: INTERNAL MEDICINE

## 2024-12-14 PROCEDURE — 85347 COAGULATION TIME ACTIVATED: CPT

## 2024-12-14 PROCEDURE — 93005 ELECTROCARDIOGRAM TRACING: CPT

## 2024-12-14 PROCEDURE — 93458 L HRT ARTERY/VENTRICLE ANGIO: CPT | Performed by: INTERNAL MEDICINE

## 2024-12-14 PROCEDURE — 84484 ASSAY OF TROPONIN QUANT: CPT

## 2024-12-14 PROCEDURE — C9600 PERC DRUG-EL COR STENT SING: HCPCS | Performed by: INTERNAL MEDICINE

## 2024-12-14 PROCEDURE — 92928 PRQ TCAT PLMT NTRAC ST 1 LES: CPT | Performed by: INTERNAL MEDICINE

## 2024-12-14 PROCEDURE — 99153 MOD SED SAME PHYS/QHP EA: CPT | Performed by: INTERNAL MEDICINE

## 2024-12-14 PROCEDURE — 93306 TTE W/DOPPLER COMPLETE: CPT | Performed by: INTERNAL MEDICINE

## 2024-12-14 PROCEDURE — 93010 ELECTROCARDIOGRAM REPORT: CPT | Performed by: INTERNAL MEDICINE

## 2024-12-14 PROCEDURE — 99152 MOD SED SAME PHYS/QHP 5/>YRS: CPT | Performed by: INTERNAL MEDICINE

## 2024-12-14 PROCEDURE — 93306 TTE W/DOPPLER COMPLETE: CPT

## 2024-12-14 PROCEDURE — 80053 COMPREHEN METABOLIC PANEL: CPT | Performed by: INTERNAL MEDICINE

## 2024-12-14 PROCEDURE — 85049 AUTOMATED PLATELET COUNT: CPT

## 2024-12-14 PROCEDURE — C1894 INTRO/SHEATH, NON-LASER: HCPCS | Performed by: INTERNAL MEDICINE

## 2024-12-14 PROCEDURE — 83735 ASSAY OF MAGNESIUM: CPT | Performed by: INTERNAL MEDICINE

## 2024-12-14 PROCEDURE — C1725 CATH, TRANSLUMIN NON-LASER: HCPCS | Performed by: INTERNAL MEDICINE

## 2024-12-14 DEVICE — STENT ONYXNG35012UX ONYX 3.50X12RX
Type: IMPLANTABLE DEVICE | Site: CORONARY | Status: FUNCTIONAL
Brand: ONYX FRONTIER™

## 2024-12-14 DEVICE — STENT ONYXNG25008UX ONYX 2.50X08RX
Type: IMPLANTABLE DEVICE | Site: CORONARY | Status: FUNCTIONAL
Brand: ONYX FRONTIER™

## 2024-12-14 DEVICE — STENT ONYXNG30015UX ONYX 3.00X15RX
Type: IMPLANTABLE DEVICE | Site: CORONARY | Status: FUNCTIONAL
Brand: ONYX FRONTIER™

## 2024-12-14 RX ORDER — LIDOCAINE 50 MG/G
1 PATCH TOPICAL DAILY
Status: DISCONTINUED | OUTPATIENT
Start: 2024-12-14 | End: 2024-12-16 | Stop reason: HOSPADM

## 2024-12-14 RX ORDER — FENTANYL CITRATE 50 UG/ML
INJECTION, SOLUTION INTRAMUSCULAR; INTRAVENOUS CODE/TRAUMA/SEDATION MEDICATION
Status: DISCONTINUED | OUTPATIENT
Start: 2024-12-14 | End: 2024-12-14 | Stop reason: HOSPADM

## 2024-12-14 RX ORDER — NITROGLYCERIN 20 MG/100ML
INJECTION INTRAVENOUS CODE/TRAUMA/SEDATION MEDICATION
Status: DISCONTINUED | OUTPATIENT
Start: 2024-12-14 | End: 2024-12-14 | Stop reason: HOSPADM

## 2024-12-14 RX ORDER — VERAPAMIL HYDROCHLORIDE 2.5 MG/ML
INJECTION, SOLUTION INTRAVENOUS CODE/TRAUMA/SEDATION MEDICATION
Status: DISCONTINUED | OUTPATIENT
Start: 2024-12-14 | End: 2024-12-14 | Stop reason: HOSPADM

## 2024-12-14 RX ORDER — SODIUM CHLORIDE 9 MG/ML
75 INJECTION, SOLUTION INTRAVENOUS CONTINUOUS
Status: DISPENSED | OUTPATIENT
Start: 2024-12-14 | End: 2024-12-14

## 2024-12-14 RX ORDER — HEPARIN SODIUM 5000 [USP'U]/ML
5000 INJECTION, SOLUTION INTRAVENOUS; SUBCUTANEOUS EVERY 8 HOURS SCHEDULED
Status: DISCONTINUED | OUTPATIENT
Start: 2024-12-14 | End: 2024-12-16 | Stop reason: HOSPADM

## 2024-12-14 RX ORDER — ONDANSETRON 2 MG/ML
4 INJECTION INTRAMUSCULAR; INTRAVENOUS ONCE AS NEEDED
Status: COMPLETED | OUTPATIENT
Start: 2024-12-14 | End: 2024-12-14

## 2024-12-14 RX ORDER — NITROGLYCERIN 0.4 MG/1
0.4 TABLET SUBLINGUAL ONCE AS NEEDED
Status: COMPLETED | OUTPATIENT
Start: 2024-12-14 | End: 2024-12-14

## 2024-12-14 RX ORDER — ATORVASTATIN CALCIUM 80 MG/1
80 TABLET, FILM COATED ORAL
Status: DISCONTINUED | OUTPATIENT
Start: 2024-12-14 | End: 2024-12-16 | Stop reason: HOSPADM

## 2024-12-14 RX ORDER — ALBUTEROL SULFATE 90 UG/1
2 INHALANT RESPIRATORY (INHALATION) EVERY 4 HOURS PRN
Status: DISCONTINUED | OUTPATIENT
Start: 2024-12-14 | End: 2024-12-16 | Stop reason: HOSPADM

## 2024-12-14 RX ORDER — MIDAZOLAM HYDROCHLORIDE 2 MG/2ML
INJECTION, SOLUTION INTRAMUSCULAR; INTRAVENOUS CODE/TRAUMA/SEDATION MEDICATION
Status: DISCONTINUED | OUTPATIENT
Start: 2024-12-14 | End: 2024-12-14 | Stop reason: HOSPADM

## 2024-12-14 RX ORDER — POTASSIUM CHLORIDE 1500 MG/1
40 TABLET, EXTENDED RELEASE ORAL ONCE
Status: COMPLETED | OUTPATIENT
Start: 2024-12-14 | End: 2024-12-14

## 2024-12-14 RX ORDER — ACETAMINOPHEN 325 MG/1
975 TABLET ORAL ONCE AS NEEDED
Status: COMPLETED | OUTPATIENT
Start: 2024-12-14 | End: 2024-12-14

## 2024-12-14 RX ORDER — ISOSORBIDE MONONITRATE 30 MG/1
30 TABLET, EXTENDED RELEASE ORAL DAILY
Status: DISCONTINUED | OUTPATIENT
Start: 2024-12-14 | End: 2024-12-15

## 2024-12-14 RX ORDER — ACETAMINOPHEN 325 MG/1
975 TABLET ORAL EVERY 8 HOURS PRN
Status: DISCONTINUED | OUTPATIENT
Start: 2024-12-14 | End: 2024-12-16 | Stop reason: HOSPADM

## 2024-12-14 RX ORDER — LIDOCAINE HYDROCHLORIDE 10 MG/ML
INJECTION, SOLUTION EPIDURAL; INFILTRATION; INTRACAUDAL; PERINEURAL CODE/TRAUMA/SEDATION MEDICATION
Status: DISCONTINUED | OUTPATIENT
Start: 2024-12-14 | End: 2024-12-14 | Stop reason: HOSPADM

## 2024-12-14 RX ORDER — ASPIRIN 81 MG/1
81 TABLET ORAL DAILY
Status: DISCONTINUED | OUTPATIENT
Start: 2024-12-14 | End: 2024-12-16 | Stop reason: HOSPADM

## 2024-12-14 RX ORDER — HEPARIN SODIUM 1000 [USP'U]/ML
INJECTION, SOLUTION INTRAVENOUS; SUBCUTANEOUS CODE/TRAUMA/SEDATION MEDICATION
Status: DISCONTINUED | OUTPATIENT
Start: 2024-12-14 | End: 2024-12-14 | Stop reason: HOSPADM

## 2024-12-14 RX ORDER — OXYCODONE AND ACETAMINOPHEN 5; 325 MG/1; MG/1
1 TABLET ORAL EVERY 4 HOURS PRN
Refills: 0 | Status: CANCELLED | OUTPATIENT
Start: 2024-12-14

## 2024-12-14 RX ORDER — PANTOPRAZOLE SODIUM 40 MG/1
40 TABLET, DELAYED RELEASE ORAL
Status: DISCONTINUED | OUTPATIENT
Start: 2024-12-14 | End: 2024-12-16 | Stop reason: HOSPADM

## 2024-12-14 RX ADMIN — NITROGLYCERIN 1 INCH: 20 OINTMENT TOPICAL at 09:09

## 2024-12-14 RX ADMIN — ACETAMINOPHEN 975 MG: 325 TABLET, FILM COATED ORAL at 06:34

## 2024-12-14 RX ADMIN — LIDOCAINE 1 PATCH: 700 PATCH TOPICAL at 09:08

## 2024-12-14 RX ADMIN — Medication 12.5 MG: at 01:52

## 2024-12-14 RX ADMIN — ONDANSETRON 4 MG: 2 INJECTION INTRAMUSCULAR; INTRAVENOUS at 20:52

## 2024-12-14 RX ADMIN — NITROGLYCERIN 0.4 MG: 0.4 TABLET SUBLINGUAL at 08:41

## 2024-12-14 RX ADMIN — PERFLUTREN 0.4 ML/MIN: 6.52 INJECTION, SUSPENSION INTRAVENOUS at 10:43

## 2024-12-14 RX ADMIN — ISOSORBIDE MONONITRATE 30 MG: 30 TABLET, EXTENDED RELEASE ORAL at 11:28

## 2024-12-14 RX ADMIN — ASPIRIN 81 MG: 81 TABLET, COATED ORAL at 08:57

## 2024-12-14 RX ADMIN — HEPARIN SODIUM 5000 UNITS: 5000 INJECTION, SOLUTION INTRAVENOUS; SUBCUTANEOUS at 15:44

## 2024-12-14 RX ADMIN — HEPARIN SODIUM 5000 UNITS: 5000 INJECTION, SOLUTION INTRAVENOUS; SUBCUTANEOUS at 01:54

## 2024-12-14 RX ADMIN — SODIUM CHLORIDE 75 ML/HR: 0.9 INJECTION, SOLUTION INTRAVENOUS at 06:39

## 2024-12-14 RX ADMIN — MAGNESIUM 64 MG (MAGNESIUM CHLORIDE) TABLET,DELAYED RELEASE 64 MG: at 11:28

## 2024-12-14 RX ADMIN — HEPARIN SODIUM 5000 UNITS: 5000 INJECTION, SOLUTION INTRAVENOUS; SUBCUTANEOUS at 05:47

## 2024-12-14 RX ADMIN — HEPARIN SODIUM 5000 UNITS: 5000 INJECTION, SOLUTION INTRAVENOUS; SUBCUTANEOUS at 20:57

## 2024-12-14 RX ADMIN — TICAGRELOR 90 MG: 90 TABLET ORAL at 20:51

## 2024-12-14 RX ADMIN — Medication 2.5 MG: at 15:46

## 2024-12-14 RX ADMIN — TICAGRELOR 90 MG: 90 TABLET ORAL at 08:58

## 2024-12-14 RX ADMIN — PANTOPRAZOLE SODIUM 40 MG: 40 TABLET, DELAYED RELEASE ORAL at 11:28

## 2024-12-14 RX ADMIN — POTASSIUM CHLORIDE 40 MEQ: 1500 TABLET, EXTENDED RELEASE ORAL at 01:51

## 2024-12-14 RX ADMIN — ATORVASTATIN CALCIUM 80 MG: 80 TABLET, FILM COATED ORAL at 01:52

## 2024-12-14 RX ADMIN — Medication 12.5 MG: at 20:51

## 2024-12-14 RX ADMIN — Medication 12.5 MG: at 09:10

## 2024-12-14 RX ADMIN — ATORVASTATIN CALCIUM 80 MG: 80 TABLET, FILM COATED ORAL at 15:44

## 2024-12-14 NOTE — LETTER
[facility]  [address] [address]  No information on file.    December 16, 2024     Patient: Benjamin W Kocher   YOB: 1975   Date of Visit: 12/14/2024       To Whom it May Concern:    Benjamin Kocher is under my professional care. He was seen in the hospital from 12/14/2024 to 12/16/24. He can not return to work until he is evaluated by a cardiologist within 1 week of discharge.     If you have any questions or concerns, please don't hesitate to call.         Sincerely,     Jamir Palmer, DO

## 2024-12-14 NOTE — ED ATTENDING ATTESTATION
12/13/2024  I, Adolph Horn MD, saw and evaluated the patient. I have discussed the patient with the resident/non-physician practitioner and agree with the resident's/non-physician practitioner's findings, Plan of Care, and MDM as documented in the resident's/non-physician practitioner's note, except where noted. All available labs and Radiology studies were reviewed.  I was present for key portions of any procedure(s) performed by the resident/non-physician practitioner and I was immediately available to provide assistance.       At this point I agree with the current assessment done in the Emergency Department.  I have conducted an independent evaluation of this patient a history and physical is as follows:    49-year-old male with history of prior MI presents to the emergency department for evaluation of chest pain that radiates down the left arm with associated numbness/tingling in the left arm.  Patient also was noted to be pale and diaphoretic in the waiting room and had a syncopal event.  He continues to complain of left-sided chest pain that he rates as a 7 out of 10 in intensity.  Also reports ongoing nausea.  No abdominal pain.    On exam, patient ill-appearing and in acute distress.  Pale and diaphoretic.  Head is normocephalic atraumatic, pupils equal round reactive, heart is regular rate and rhythm with tight distal pulses, no increased work of breathing, respiratory distress, or stridor.    Differential diagnosis includes but is not limited to arrhythmia, symptomatic anemia, ACS.  Initial EKG was concerning for STEMI with ST segment elevations in V1 and V2 with reciprocal changes in the inferior leads.  Case was discussed with Dr. Jc from interventional cardiology who agreed with STEMI alert, patient will be flown to [place] for definitive management.  While in the emergency department he was treated with aspirin, Brilinta, heparin bolus and started on heparin infusion.  Was given multiple  doses of morphine for symptomatic relief.  He remained hemodynamically stable while in the emergency department, consent for transfer was obtained.  Patient significant other updated at bedside.    ED Course  ED Course as of 12/14/24 0136   Fri Dec 13, 2024   2315 No widened mediastinum or acute cardiopulmonary abnormality seen on my interpretation of chest x-ray   Initial EKG was interpreted by myself at 2304: EKG demonstrated sinus rhythm with a rate of 81, normal axis, ST elevations in V1 and V2 with reciprocal depressions in the inferior leads concerning for STEMI\    Repeat EKG interpreted by myself at 2313: EKG demonstrated normal sinus rhythm with a rate of 74, normal axis, normal intervals, significant ST segment depressions in the inferior leads as well as new depressions in V4, V5, and V6      Critical Care Time  CriticalCare Time    Date/Time: 12/13/2024 11:25 PM    Performed by: Adolph Horn MD  Authorized by: Adolph Horn MD    Critical care provider statement:     Critical care time (minutes):  32    Critical care time was exclusive of:  Separately billable procedures and treating other patients and teaching time    Critical care was necessary to treat or prevent imminent or life-threatening deterioration of the following conditions:  Cardiac failure    Critical care was time spent personally by me on the following activities:  Obtaining history from patient or surrogate, development of treatment plan with patient or surrogate, discussions with consultants, evaluation of patient's response to treatment, examination of patient, ordering and performing treatments and interventions, ordering and review of laboratory studies, ordering and review of radiographic studies, re-evaluation of patient's condition and review of old charts

## 2024-12-14 NOTE — H&P
"H&P - Cardiology Team 2  Benjamin W Kocher 49 y.o. male MRN: [id number]  Unit/Bed#: BE CATH LAB ROOM Encounter: [id number]      PCP: Kirk Chakraborty MD   Outpatient Cardiologist: Follows with Dr. Erwin at [facility]    History of Present Illness   Physician Requesting Consult: Ranjeet Jc MD  Reason for Consult / Principal Problem: STEMI    Assessment and Plan      Assessment:  # STEMI  Via right radial access  S/p PCI and 3x ABBI to the mid LAD. 1x ABBI to the RCA  Full report to follow  # HLD  # Prediabetes  Last A1c 6.1  # hx of tobacco use    Plan:  # STEMI  # HLD  # Prediabetes  # hx of tobacco use  S/p ASA and ticegrelor load  continue ASA 81mg qd and ticegrelor 90mg BID  75cc NS x10hr  Start atorvastatin 80mg qd, patient reportedly not taking statin prior to this  Start metoprolol tartrate 12.5mg bid  check a1c, lipid panel, TSH  Post cath EKG  TTE in the AM  Price check ticagrelor  Cardiac rehab on d/c  Continue to encourage tobacco cessation    Case discussed and reviewed with Dr. Jc. Please wait for final recommendations from Dr. Jc.      Subjective     HPI:     Alexx Willard is a 50yo M PMH prediabetes, HLD, former tobacco use, reportedly a prior \"heart attack\" in 2007 at the age of 31 with subsequent cath reportedly normal per chart review, who presented to [facility] ED with concerns of chest pain.    The chest pain was sudden onset, left sided, and 7/10, no palpitations or diaphoresis.     Vitals on presentation afebrile, HR 76, /80, satting 100% on 2L NC  Initial trop 5, WBC of 16, CMP with potassium 3.6, Cr 1.13 and at baseline    EKG with ST elevations in V1 and V2, depressions II, III, AVF, V5, V6  CXR shows signs of mild pulmonary congestion    STEMI alert was called. He was loaded with ASA 324mg, ticegrelor 180mg, and transfered via life flight to [facility] for emergent cath.     Hx of tobacco use, last used 5 years ago. Rare alcohol use, no drug use.     Review of Systems  CONSTITUTIONAL: " Denies any fever, chills, rigors, and weight loss  HEENT: No earache or tinnitus, denies hearing loss or visual disturbances  CARDIOVASCULAR: As noted in HPI  RESPIRATORY: Denies any cough, hemoptysis, shortness of breath or dyspnea on exertion  GASTROINTESTINAL: Denies any diarrhea or constipation  GENITOURINARY: No problems with urination, denies any hematuria or dysuria  NEUROLOGIC: No dizziness or vertigo, denies headaches   MUSCULOSKELETAL: Denies any muscle or joint pain   SKIN: Denies skin rashes or itching   ENDOCRINE: Denies excessive thirst, denies intolerance to heat or cold      Objective     Physical Exam  Vitals and nursing note reviewed.   Constitutional:       Appearance: He is well-developed.   HENT:      Head: Normocephalic and atraumatic.      Right Ear: External ear normal.      Left Ear: External ear normal.      Mouth/Throat:      Mouth: Mucous membranes are moist.   Eyes:      Extraocular Movements: Extraocular movements intact.      Conjunctiva/sclera: Conjunctivae normal.      Pupils: Pupils are equal, round, and reactive to light.   Cardiovascular:      Rate and Rhythm: Normal rate and regular rhythm.      Pulses: Normal pulses.      Heart sounds: Normal heart sounds. No murmur heard.  Pulmonary:      Effort: Pulmonary effort is normal. No respiratory distress.      Breath sounds: Normal breath sounds. No wheezing, rhonchi or rales.   Abdominal:      General: Bowel sounds are normal. There is no distension.      Palpations: Abdomen is soft. There is no mass.      Tenderness: There is no abdominal tenderness. There is no guarding.   Musculoskeletal:         General: No swelling or deformity.      Cervical back: Neck supple.      Right lower leg: No edema.      Left lower leg: No edema.   Skin:     General: Skin is warm and dry.      Capillary Refill: Capillary refill takes less than 2 seconds.      Coloration: Skin is not jaundiced.      Findings: No bruising, lesion or rash.   Neurological:       General: No focal deficit present.      Mental Status: He is alert and oriented to person, place, and time.         Vitals:  O2 Device: Nasal cannula  Nasal Cannula O2 Flow Rate (L/min):  [2 L/min] 2 L/min      Intake and Outputs:  I/O       None            Labs & Results:          Results from last 7 days   Lab Units 12/13/24  2304   POTASSIUM mmol/L 3.6   CO2 mmol/L 22   CHLORIDE mmol/L 104   BUN mg/dL 14   CREATININE mg/dL 1.13   EGFR ml/min/1.73sq m 75     Results from last 7 days   Lab Units 12/13/24  2304   AST U/L 23   ALT U/L 33   ALK PHOS U/L 108*   TOTAL PROTEIN g/dL 6.8   ALBUMIN g/dL 4.1   TOTAL BILIRUBIN mg/dL 0.36     Results from last 7 days   Lab Units 12/13/24  2308   PROTIME seconds 14.4   INR  1.07     Results from last 7 days   Lab Units 12/13/24  2304   HEMOGLOBIN g/dL 14.8   HEMATOCRIT % 41.8   PLATELETS Thousands/uL 291           Cardiac Imaging/Monitoring     Telemetry:   Personally reviewed by Johnson Witt MD:   none    Previous Cath/PCI:  No results found for this or any previous visit.      Prior Echo:   Results for orders placed during the hospital encounter of 03/09/22    Echo complete w/ contrast if indicated    Interpretation Summary    Left Ventricle: Left ventricular cavity size is normal. Systolic function is normal. Wall motion is normal. There is borderline concentric hypertrophy.      Prior Stress Test:  No results found for this or any previous visit.       Holter:       Recent Device Check:  No results found for any visits on 12/14/24.     EKG:  Anterior ST elevations  Encounter Date: 12/13/24   ECG 12 lead   Result Value    Ventricular Rate 74    Atrial Rate 74    ID Interval 146    QRSD Interval 88    QT Interval 390    QTC Interval 432    P Axis 64    QRS Axis 62    T Wave Fowler 10       Johnson Witt MD  Cardiovascular Disease Fellow, FY-1  [facility]

## 2024-12-14 NOTE — ED PROVIDER NOTES
Time reflects when diagnosis was documented in both MDM as applicable and the Disposition within this note       Time User Action Codes Description Comment    12/13/2024 11:16 PM Kami Fay Add [I21.3] STEMI (ST elevation myocardial infarction) (HCC)           ED Disposition       ED Disposition   Transfer to Another Facility-In Network    Condition   --    Date/Time   Fri Dec 13, 2024 11:16 PM    Comment   Benjamin W Kocher should be transferred out to [facility].               Assessment & Plan       Medical Decision Making  Amount and/or Complexity of Data Reviewed  Labs: ordered.    Risk  Prescription drug management.    Patient is a 49 y.o. male with PMH of CAD who presents to the ED with chest pain, syncopal episode.    Vital signs stable. On exam patient diaphoretic, uncomfortable appearing. Patient had syncopal episode in the lobby and was immediately brought back to a room for evaluation.     History and physical exam most consistent with STEMI. However, differential diagnosis included but not limited to ACS, arrhythmia. Bedside ultrasound completed with no evidence of effusion, and CXR obtained with no evidence of widened mediastinum.     Plan: gave patient  mg (he did not take his ASA today), morphine and zofran, started IV fluids, obtained ECG with some concern for ST elevations anteriorly with reciprocal depressions inferiorly. Contacted PACS to call STEMI alert, discussed with interventional cardiology (Dr. Flores) and will give heparin bolus and infusion, brilinta, and transfer to [facility] for cath lab.    Initial EKG as interpreted by me showed NSR with rate of 81, normal axis, ST elevations V1 and V2, depressions in II, III, aVF    View ED course for further discussion on patient workup.     On review of previous records reviewed discharge summary from 3/9/22 - patient was admitted to the hospital for chest pain and stroke like symptoms. Echocardiogram at that time with poor windows,  "overall normal diastolic and systolic function    All labs reviewed and utilized in the medical decision making process  All radiology studies independently viewed by me and interpreted by the radiologist.  I reviewed all testing with the patient.     Upon re-evaluation patients vitals remained stable, pain still present.    Disposition: I discussed the case with Interventional Cardiology.  We reviewed the HPI, pertinent PMH, ED course and workup. Agreed with plan and will transfer the patient to [facility] for further evaluation and management of MI. Patient in stable condition at this time.       Portions of the record may have been created with voice recognition software. Occasional wrong word or \"sound a like\" substitutions may have occurred due to the inherent limitations of voice recognition software. Read the chart carefully and recognize, using context, where substitutions have occurred.      ED Course as of 12/14/24 0140   Fri Dec 13, 2024   2308 Called PACS for level 1 STEMI alert. Contacting interventional cardiology   2311 Patient will be sent level 1 by air to [place]       Medications   ondansetron (ZOFRAN) injection 4 mg (4 mg Intravenous Given 12/13/24 2304)   morphine injection 4 mg (4 mg Intravenous Given 12/13/24 2307)   aspirin chewable tablet 324 mg (324 mg Oral Given 12/13/24 2307)   morphine injection 4 mg (4 mg Intravenous Given 12/13/24 2314)   ticagrelor (BRILINTA) tablet 180 mg (180 mg Oral Given 12/13/24 2314)   heparin (porcine) injection 4,000 Units (4,000 Units Intravenous Given 12/13/24 2315)   morphine injection 4 mg (4 mg Intravenous Given 12/13/24 2335)       ED Risk Strat Scores   HEART Risk Score      Flowsheet Row Most Recent Value   Heart Score Risk Calculator    History 2 Filed at: 12/13/2024 2335   ECG 2 Filed at: 12/13/2024 2335   Age 1 Filed at: 12/13/2024 2335   Risk Factors 2 Filed at: 12/13/2024 2335   Troponin 0 Filed at: 12/13/2024 2335   HEART Score 7 Filed " at: 2024          HEART Risk Score      Flowsheet Row Most Recent Value   Heart Score Risk Calculator    History 2 Filed at: 2024   ECG 2 Filed at: 2024   Age 1 Filed at: 2024   Risk Factors 2 Filed at: 2024   Troponin 0 Filed at: 2024   HEART Score 7 Filed at: 2024                            SBIRT 22yo+      Flowsheet Row Most Recent Value   Initial Alcohol Screen: US AUDIT-C     1. How often do you have a drink containing alcohol? 0 Filed at: 2024   2. How many drinks containing alcohol do you have on a typical day you are drinking?  0 Filed at: 2024   3a. Male UNDER 65: How often do you have five or more drinks on one occasion? 0 Filed at: 2024   3b. FEMALE Any Age, or MALE 65+: How often do you have 4 or more drinks on one occassion? 0 Filed at: 2024   Audit-C Score 0 Filed at: 2024   JOVAN: How many times in the past year have you...    Used an illegal drug or used a prescription medication for non-medical reasons? Never Filed at: 2024                            History of Present Illness       Chief Complaint   Patient presents with    Chest Pain     Started about 20mins ago. CP, dizziness, pain down the left arm. Went down at the registration.        Past Medical History:   Diagnosis Date    COPD (chronic obstructive pulmonary disease) ([facility])     DVT of lower limb, acute (HCC)     MI (myocardial infarction) (Formerly [facility])       Past Surgical History:   Procedure Laterality Date    KNEE SURGERY  2010      Family History   Problem Relation Age of Onset    Hypertension Mother     Heart attack Father 42            Heart failure Paternal Aunt         pacemaker    Heart disease Paternal Uncle     Heart failure Paternal Grandmother         pacemaker    Heart attack Paternal Grandfather         cabg    Heart disease Paternal Grandfather 62        heart transplant      Social  "History     Tobacco Use    Smoking status: Former     Current packs/day: 0.00     Average packs/day: 0.5 packs/day for 24.0 years (12.0 ttl pk-yrs)     Types: Cigarettes     Start date:      Quit date: 2018     Years since quittin.9    Smokeless tobacco: Current    Tobacco comments:     \"here and there a cigerette\"   Vaping Use    Vaping status: Never Used   Substance Use Topics    Alcohol use: Not Currently    Drug use: Not Currently      E-Cigarette/Vaping    E-Cigarette Use Never User       E-Cigarette/Vaping Substances    Nicotine No     THC No     CBD No     Flavoring No     Other No     Unknown No       I have reviewed and agree with the history as documented.     HPI  Patient is a 49 y.o. male with history of CAD presenting to the emergency department for chest pain. Patient states that roughly 20-30 minutes prior to arrival he started having pain in the left side of his chest, right side of his chest, and his left arm. He also complains of feeling nauseated, dizzy, and sweaty. Patient states that he had just had sexual intercourse before his symptoms started. On arrival to the ED he was feeling worse and had syncopal episode in the waiting room. Patient states that these symptoms feel \"somewhat the same\" as his prior MI. He states that he has extensive family history of heart disease. He did not take his ASA today or yesterday. Denies having any headache, abdominal pain, or pain radiating into his back.     Review of Systems   Constitutional:  Negative for fever.   HENT:  Negative for congestion.    Eyes:  Negative for pain.   Respiratory:  Negative for cough.    Cardiovascular:  Positive for chest pain.   Gastrointestinal:  Positive for nausea. Negative for diarrhea.   Genitourinary:  Negative for dysuria.   Musculoskeletal:  Negative for back pain.   Skin:  Negative for rash.   Neurological:  Positive for dizziness and syncope.   All other systems reviewed and are negative.          Objective "       ED Triage Vitals [12/13/24 2310]   Temperature Pulse Blood Pressure Respirations SpO2 Patient Position - Orthostatic VS   (!) 97.2 °F (36.2 °C) 76 126/80 15 98 % Sitting      Temp Source Heart Rate Source BP Location FiO2 (%) Pain Score    Tympanic Monitor Left arm -- 10 - Worst Possible Pain      Vitals      Date and Time Temp Pulse SpO2 Resp BP Pain Score FACES Pain Rating User   12/13/24 2335 -- -- -- -- -- 7 --    12/13/24 2330 -- 69 100 % 12 137/80 -- --    12/13/24 2325 -- 73 99 % 15 126/76 -- --    12/13/24 2321 -- -- -- -- -- 7 --    12/13/24 2320 -- 74 98 % 18 123/76 -- --    12/13/24 2310 -- -- -- -- -- 10 - Worst Possible Pain --    12/13/24 2310 -- 76 Simultaneous filing. User may not have seen previous data. 98 % Simultaneous filing. User may not have seen previous data. 15 Simultaneous filing. User may not have seen previous data. 126/80 Simultaneous filing. User may not have seen previous data. -- --    12/13/24 2310 97.2 °F (36.2 °C) -- -- -- -- -- -- KB            Physical Exam  Vitals and nursing note reviewed.   Constitutional:       Appearance: Normal appearance. He is ill-appearing.      Comments: Diaphoretic, pale appearing   HENT:      Head: Normocephalic and atraumatic.      Mouth/Throat:      Mouth: Mucous membranes are moist.   Eyes:      Conjunctiva/sclera: Conjunctivae normal.   Cardiovascular:      Rate and Rhythm: Normal rate and regular rhythm.      Pulses: Normal pulses.      Heart sounds: Normal heart sounds.   Pulmonary:      Effort: Pulmonary effort is normal.      Breath sounds: Normal breath sounds.   Chest:      Chest wall: No tenderness.   Abdominal:      Palpations: Abdomen is soft.      Tenderness: There is no abdominal tenderness.   Musculoskeletal:         General: No tenderness.      Cervical back: Neck supple.      Right lower leg: No edema.      Left lower leg: No edema.   Skin:     General: Skin is warm and dry.      Capillary Refill: Capillary  refill takes less than 2 seconds.   Neurological:      General: No focal deficit present.      Mental Status: He is alert. Mental status is at baseline.   Psychiatric:         Mood and Affect: Mood normal.         Results Reviewed       Procedure Component Value Units Date/Time    HS Troponin I 4hr [[id number]]     Lab Status: No result Specimen: Blood     RBC Morphology Reflex Test [[id number]] Collected: 12/13/24 2304    Lab Status: Final result Specimen: Blood from Arm, Right Updated: 12/14/24 0001    CBC and differential [[id number]]  (Abnormal) Collected: 12/13/24 2304    Lab Status: Final result Specimen: Blood from Arm, Right Updated: 12/13/24 2333     WBC 16.07 Thousand/uL      RBC 4.57 Million/uL      Hemoglobin 14.8 g/dL      Hematocrit 41.8 %      MCV 92 fL      MCH 32.4 pg      MCHC 35.4 g/dL      RDW 13.9 %      MPV 10.0 fL      Platelets 291 Thousands/uL     Narrative:      This is an appended report.  These results have been appended to a previously verified report.    Manual Differential(PHLEBS Do Not Order) [[id number]]  (Abnormal) Collected: 12/13/24 2304    Lab Status: Final result Specimen: Blood from Arm, Right Updated: 12/13/24 2333     Segmented % 51 %      Lymphocytes % 36 %      Monocytes % 8 %      Eosinophils % 2 %      Basophils % 0 %      Atypical Lymphocytes % 3 %      Absolute Neutrophils 8.20 Thousand/uL      Absolute Lymphocytes 6.27 Thousand/uL      Absolute Monocytes 1.29 Thousand/uL      Absolute Eosinophils 0.32 Thousand/uL      Absolute Basophils 0.00 Thousand/uL      Total Counted --     RBC Morphology Normal     Platelet Estimate Adequate    HS Troponin 0hr (reflex protocol) [[id number]]  (Normal) Collected: 12/13/24 2304    Lab Status: Final result Specimen: Blood from Arm, Right Updated: 12/13/24 2332     hs TnI 0hr 5 ng/L     HS Troponin I 2hr [[id number]]     Lab Status: No result Specimen: Blood     Comprehensive metabolic panel [[id number]]  (Abnormal) Collected: 12/13/24 2304     Lab Status: Final result Specimen: Blood from Arm, Right Updated: 12/13/24 2326     Sodium 135 mmol/L      Potassium 3.6 mmol/L      Chloride 104 mmol/L      CO2 22 mmol/L      ANION GAP 9 mmol/L      BUN 14 mg/dL      Creatinine 1.13 mg/dL      Glucose 124 mg/dL      Calcium 9.0 mg/dL      AST 23 U/L      ALT 33 U/L      Alkaline Phosphatase 108 U/L      Total Protein 6.8 g/dL      Albumin 4.1 g/dL      Total Bilirubin 0.36 mg/dL      eGFR 75 ml/min/1.73sq m     Narrative:      National Kidney Disease Foundation guidelines for Chronic Kidney Disease (CKD):     Stage 1 with normal or high GFR (GFR > 90 mL/min/1.73 square meters)    Stage 2 Mild CKD (GFR = 60-89 mL/min/1.73 square meters)    Stage 3A Moderate CKD (GFR = 45-59 mL/min/1.73 square meters)    Stage 3B Moderate CKD (GFR = 30-44 mL/min/1.73 square meters)    Stage 4 Severe CKD (GFR = 15-29 mL/min/1.73 square meters)    Stage 5 End Stage CKD (GFR <15 mL/min/1.73 square meters)  Note: GFR calculation is accurate only with a steady state creatinine    Magnesium [[id number]]  (Normal) Collected: 12/13/24 2304    Lab Status: Final result Specimen: Blood from Arm, Right Updated: 12/13/24 2326     Magnesium 2.0 mg/dL     Protime-INR [[id number]]  (Normal) Collected: 12/13/24 2308    Lab Status: Final result Specimen: Blood from Arm, Right Updated: 12/13/24 2320     Protime 14.4 seconds      INR 1.07    Narrative:      INR Therapeutic Range    Indication                                             INR Range      Atrial Fibrillation                                               2.0-3.0  Hypercoagulable State                                    2.0.2.3  Left Ventricular Asist Device                            2.0-3.0  Mechanical Heart Valve                                  -    Aortic(with afib, MI, embolism, HF, LA enlargement,    and/or coagulopathy)                                     2.0-3.0 (2.5-3.5)     Mitral                                                              2.5-3.5  Prosthetic/Bioprosthetic Heart Valve               2.0-3.0  Venous thromboembolism (VTE: VT, PE        2.0-3.0    APTT [[id number]]  (Normal) Collected: 248    Lab Status: Final result Specimen: Blood from Arm, Right Updated: 24     PTT 23 seconds     APTT six (6) hours after Heparin bolus/drip initiation or dosing change [[id number]]     Lab Status: No result Specimen: Blood             X-ray chest 1 view portable    (Results Pending)       POC Cardiac US    Date/Time: 2024 11:16 PM    Performed by: Kami Fay DO  Authorized by: Kami Fay DO        ED Medication and Procedure Management   Prior to Admission Medications   Prescriptions Last Dose Informant Patient Reported? Taking?   Clenpiq 10-3.5-12 MG-GM -GM/160ML SOLN   Yes No   Sig: TAKE 320 ML AS DIRECTED-FOLLOW INSTRUCTIONS PROVIDED BY OFFICE   Patient not taking: Reported on 6/3/2024   acetaminophen (TYLENOL) 500 mg tablet   No No   Sig: Take 2 tablets (1,000 mg total) by mouth every 8 (eight) hours   aspirin 81 mg chewable tablet  Self No No   Sig: Chew 1 tablet (81 mg total) daily   atorvastatin (LIPITOR) 40 mg tablet  Self No No   Sig: Take 1 tablet (40 mg total) by mouth every evening   Patient not taking: Reported on 3/23/2022    azelastine (ASTELIN) 0.1 % nasal spray  Self No No   Si spray into each nostril 2 (two) times a day   Patient not taking: Reported on 6/3/2024   ibuprofen (MOTRIN) 600 mg tablet   No No   Sig: Take 1 tablet (600 mg total) by mouth every 6 (six) hours as needed for mild pain   methocarbamol (ROBAXIN) 500 mg tablet   No No   Sig: Take 1 tablet (500 mg total) by mouth 3 (three) times a day as needed for muscle spasms   Patient not taking: Reported on 6/3/2024   methocarbamol (ROBAXIN) 500 mg tablet   No No   Sig: Take 1 tablet (500 mg total) by mouth 2 (two) times a day   nortriptyline (PAMELOR) 10 mg capsule   No No   Sig: Take 1 capsule nightly for 1 week and increase to  2 capsules nightly after that.   Patient not taking: Reported on 6/3/2024      Facility-Administered Medications: None     Discharge Medication List as of 12/13/2024 11:48 PM        CONTINUE these medications which have NOT CHANGED    Details   acetaminophen (TYLENOL) 500 mg tablet Take 2 tablets (1,000 mg total) by mouth every 8 (eight) hours, Starting Mon 6/17/2024, Normal      aspirin 81 mg chewable tablet Chew 1 tablet (81 mg total) daily, Starting Thu 3/10/2022, Normal      atorvastatin (LIPITOR) 40 mg tablet Take 1 tablet (40 mg total) by mouth every evening, Starting Wed 3/9/2022, Normal      azelastine (ASTELIN) 0.1 % nasal spray 1 spray into each nostril 2 (two) times a day, Starting Mon 11/15/2021, Normal      Clenpiq 10-3.5-12 MG-GM -GM/160ML SOLN TAKE 320 ML AS DIRECTED-FOLLOW INSTRUCTIONS PROVIDED BY OFFICE, Historical Med      ibuprofen (MOTRIN) 600 mg tablet Take 1 tablet (600 mg total) by mouth every 6 (six) hours as needed for mild pain, Starting Mon 6/17/2024, Normal      !! methocarbamol (ROBAXIN) 500 mg tablet Take 1 tablet (500 mg total) by mouth 3 (three) times a day as needed for muscle spasms, Starting Thu 1/11/2024, Normal      !! methocarbamol (ROBAXIN) 500 mg tablet Take 1 tablet (500 mg total) by mouth 2 (two) times a day, Starting Mon 6/17/2024, Normal      nortriptyline (PAMELOR) 10 mg capsule Take 1 capsule nightly for 1 week and increase to 2 capsules nightly after that., Normal       !! - Potential duplicate medications found. Please discuss with provider.        No discharge procedures on file.  ED SEPSIS DOCUMENTATION   Time reflects when diagnosis was documented in both MDM as applicable and the Disposition within this note       Time User Action Codes Description Comment    12/13/2024 11:16 PM Kami Fay Add [I21.3] STEMI (ST elevation myocardial infarction) (HCC)                  Kami Fay DO  12/14/24 0140

## 2024-12-14 NOTE — CASE MANAGEMENT
Case Management Discharge Planning Note    Patient name Benjamin W Kocher  [facility] [id number]  : 1975 Date 2024       Current Admission Date: 2024  Current Admission Diagnosis:STEMI (ST elevation myocardial infarction) ([facility])   Patient Active Problem List    Diagnosis Date Noted Date Diagnosed    STEMI (ST elevation myocardial infarction) ([facility]) 2024     Prediabetes 2024     Chest pain 2022     CVA (cerebral vascular accident) ([facility]) 2022     Family history of ischemic heart disease (IHD) 02/15/2022     HLD (hyperlipidemia) 02/15/2022     Exertional dyspnea 02/15/2022       LOS (days): 0  Geometric Mean LOS (GMLOS) (days):   Days to GMLOS:     OBJECTIVE:  Risk of Unplanned Readmission Score: 9.22         Current admission status: Inpatient   Preferred Pharmacy:   [id number] [address] [address]  Phone: [phone] Fax: [phone]    [id number] [address]  [address] [address]  Phone: [phone] Fax: [phone]    [id number] [address]  [address] [address]  Phone: [phone] Fax: [phone]    [facility]  [place] - [address]  [address]  [place] [address]  Phone: [phone] Fax: [phone]    Primary Care Provider: Kirk Chakraborty MD    Primary Insurance: BLUE CROSS  Secondary Insurance:     DISCHARGE DETAILS:          Additional Comments: CM made aware that pt needed Brilinta priced checked. CM called pharmacy and made aware cost will be $25. CM made aware by provider that pt will d/c .

## 2024-12-14 NOTE — ED NOTES
This nurse was unable to give report due to staff not at cath lab to take reports. Pac called to verify that. Pac given will give charge nurse number call for report when available.     Dayron Iverson RN  12/13/24 [id number]

## 2024-12-14 NOTE — EMTALA/ACUTE CARE TRANSFER
[facility] EMERGENCY DEPARTMENT  [address] [place] [address]  Dept: [phone]      EMTALA TRANSFER CONSENT    NAME Benjamin W Kocher                                         1975                              MRN [id number]    I have been informed of my rights regarding examination, treatment, and transfer   by Dr. Adolph Horn MD    Benefits: Specialized equipment and/or services available at the receiving facility (Include comment)________________________ (Cath lab)    Risks: Potential for delay in receiving treatment, Potential deterioration of medical condition, Loss of IV, Increased discomfort during transfer, Possible worsening of condition or death during transfer      Consent for Transfer:  I acknowledge that my medical condition has been evaluated and explained to me by the emergency department physician or other qualified medical person and/or my attending physician, who has recommended that I be transferred to the service of  Accepting Physician: Dr. Jc at Accepting Facility Name, City & State : [facility]. The above potential benefits of such transfer, the potential risks associated with such transfer, and the probable risks of not being transferred have been explained to me, and I fully understand them.  The doctor has explained that, in my case, the benefits of transfer outweigh the risks.  I agree to be transferred.    I authorize the performance of emergency medical procedures and treatments upon me in both transit and upon arrival at the receiving facility.  Additionally, I authorize the release of any and all medical records to the receiving facility and request they be transported with me, if possible.  I understand that the safest mode of transportation during a medical emergency is an ambulance and that the Hospital advocates the use of this mode of transport. Risks of traveling to the receiving facility by car, including absence of medical  control, life sustaining equipment, such as oxygen, and medical personnel has been explained to me and I fully understand them.    (ELANA CORRECT BOX BELOW)  [  ]  I consent to the stated transfer and to be transported by ambulance/helicopter.  [  ]  I consent to the stated transfer, but refuse transportation by ambulance and accept full responsibility for my transportation by car.  I understand the risks of non-ambulance transfers and I exonerate the Hospital and its staff from any deterioration in my condition that results from this refusal.    X___________________________________________    DATE  24  TIME________  Signature of patient or legally responsible individual signing on patient behalf           RELATIONSHIP TO PATIENT_________________________          Provider Certification    NAME Benjamin W Kocher                                         1975                              MRN [id number]    A medical screening exam was performed on the above named patient.  Based on the examination:    Condition Necessitating Transfer The encounter diagnosis was STEMI (ST elevation myocardial infarction) (HCC).    Patient Condition: The patient has been stabilized such that within reasonable medical probability, no material deterioration of the patient condition or the condition of the unborn child(gunnar) is likely to result from the transfer    Reason for Transfer: Level of Care needed not available at this facility    Transfer Requirements: Facility [facility]   Space available and qualified personnel available for treatment as acknowledged by Iris Cazares  Agreed to accept transfer and to provide appropriate medical treatment as acknowledged by       Dr. Jc  Appropriate medical records of the examination and treatment of the patient are provided at the time of transfer   STAFF INITIAL WHEN COMPLETED _______  Transfer will be performed by qualified personnel from    and appropriate transfer  equipment as required, including the use of necessary and appropriate life support measures.    Provider Certification: I have examined the patient and explained the following risks and benefits of being transferred/refusing transfer to the patient/family:  General risk, such as traffic hazards, adverse weather conditions, rough terrain or turbulence, possible failure of equipment (including vehicle or aircraft), or consequences of actions of persons outside the control of the transport personnel, Unanticipated needs of medical equipment and personnel during transport, Risk of worsening condition, The possibility of a transport vehicle being unavailable      Based on these reasonable risks and benefits to the patient and/or the unborn child(gunnar), and based upon the information available at the time of the patient’s examination, I certify that the medical benefits reasonably to be expected from the provision of appropriate medical treatments at another medical facility outweigh the increasing risks, if any, to the individual’s medical condition, and in the case of labor to the unborn child, from effecting the transfer.    X____________________________________________ DATE 12/13/24        TIME_______      ORIGINAL - SEND TO MEDICAL RECORDS   COPY - SEND WITH PATIENT DURING TRANSFER

## 2024-12-15 PROBLEM — I50.22 HEART FAILURE WITH MID-RANGE EJECTION FRACTION (HCC): Status: ACTIVE | Noted: 2024-12-15

## 2024-12-15 LAB
ANION GAP SERPL CALCULATED.3IONS-SCNC: 6 MMOL/L (ref 4–13)
BASOPHILS # BLD AUTO: 0.05 THOUSANDS/ÂΜL (ref 0–0.1)
BASOPHILS NFR BLD AUTO: 1 % (ref 0–1)
BUN SERPL-MCNC: 11 MG/DL (ref 5–25)
CALCIUM SERPL-MCNC: 8.8 MG/DL (ref 8.4–10.2)
CHLORIDE SERPL-SCNC: 107 MMOL/L (ref 96–108)
CO2 SERPL-SCNC: 24 MMOL/L (ref 21–32)
CREAT SERPL-MCNC: 0.98 MG/DL (ref 0.6–1.3)
EOSINOPHIL # BLD AUTO: 0.14 THOUSAND/ÂΜL (ref 0–0.61)
EOSINOPHIL NFR BLD AUTO: 1 % (ref 0–6)
ERYTHROCYTE [DISTWIDTH] IN BLOOD BY AUTOMATED COUNT: 14.6 % (ref 11.6–15.1)
GFR SERPL CREATININE-BSD FRML MDRD: 89 ML/MIN/1.73SQ M
GLUCOSE SERPL-MCNC: 115 MG/DL (ref 65–140)
HCT VFR BLD AUTO: 39.8 % (ref 36.5–49.3)
HGB BLD-MCNC: 13.7 G/DL (ref 12–17)
IMM GRANULOCYTES # BLD AUTO: 0.03 THOUSAND/UL (ref 0–0.2)
IMM GRANULOCYTES NFR BLD AUTO: 0 % (ref 0–2)
LYMPHOCYTES # BLD AUTO: 2.01 THOUSANDS/ÂΜL (ref 0.6–4.47)
LYMPHOCYTES NFR BLD AUTO: 18 % (ref 14–44)
MCH RBC QN AUTO: 31.9 PG (ref 26.8–34.3)
MCHC RBC AUTO-ENTMCNC: 34.4 G/DL (ref 31.4–37.4)
MCV RBC AUTO: 93 FL (ref 82–98)
MONOCYTES # BLD AUTO: 0.8 THOUSAND/ÂΜL (ref 0.17–1.22)
MONOCYTES NFR BLD AUTO: 7 % (ref 4–12)
NEUTROPHILS # BLD AUTO: 8.05 THOUSANDS/ÂΜL (ref 1.85–7.62)
NEUTS SEG NFR BLD AUTO: 73 % (ref 43–75)
NRBC BLD AUTO-RTO: 0 /100 WBCS
PLATELET # BLD AUTO: 264 THOUSANDS/UL (ref 149–390)
PMV BLD AUTO: 10 FL (ref 8.9–12.7)
POTASSIUM SERPL-SCNC: 3.9 MMOL/L (ref 3.5–5.3)
RBC # BLD AUTO: 4.29 MILLION/UL (ref 3.88–5.62)
SODIUM SERPL-SCNC: 137 MMOL/L (ref 135–147)
WBC # BLD AUTO: 11.08 THOUSAND/UL (ref 4.31–10.16)

## 2024-12-15 PROCEDURE — 80048 BASIC METABOLIC PNL TOTAL CA: CPT | Performed by: INTERNAL MEDICINE

## 2024-12-15 PROCEDURE — 85025 COMPLETE CBC W/AUTO DIFF WBC: CPT | Performed by: INTERNAL MEDICINE

## 2024-12-15 PROCEDURE — 99232 SBSQ HOSP IP/OBS MODERATE 35: CPT | Performed by: INTERNAL MEDICINE

## 2024-12-15 RX ORDER — LOSARTAN POTASSIUM 25 MG/1
25 TABLET ORAL DAILY
Status: DISCONTINUED | OUTPATIENT
Start: 2024-12-16 | End: 2024-12-16 | Stop reason: HOSPADM

## 2024-12-15 RX ORDER — SPIRONOLACTONE 25 MG/1
25 TABLET ORAL DAILY
Status: DISCONTINUED | OUTPATIENT
Start: 2024-12-15 | End: 2024-12-16 | Stop reason: HOSPADM

## 2024-12-15 RX ORDER — METOPROLOL SUCCINATE 25 MG/1
25 TABLET, EXTENDED RELEASE ORAL DAILY
Status: DISCONTINUED | OUTPATIENT
Start: 2024-12-16 | End: 2024-12-16 | Stop reason: HOSPADM

## 2024-12-15 RX ADMIN — PANTOPRAZOLE SODIUM 40 MG: 40 TABLET, DELAYED RELEASE ORAL at 06:41

## 2024-12-15 RX ADMIN — ASPIRIN 81 MG: 81 TABLET, COATED ORAL at 08:29

## 2024-12-15 RX ADMIN — TICAGRELOR 90 MG: 90 TABLET ORAL at 20:09

## 2024-12-15 RX ADMIN — Medication 12.5 MG: at 08:29

## 2024-12-15 RX ADMIN — ISOSORBIDE MONONITRATE 30 MG: 30 TABLET, EXTENDED RELEASE ORAL at 08:29

## 2024-12-15 RX ADMIN — HEPARIN SODIUM 5000 UNITS: 5000 INJECTION, SOLUTION INTRAVENOUS; SUBCUTANEOUS at 06:41

## 2024-12-15 RX ADMIN — Medication 12.5 MG: at 20:09

## 2024-12-15 RX ADMIN — HEPARIN SODIUM 5000 UNITS: 5000 INJECTION, SOLUTION INTRAVENOUS; SUBCUTANEOUS at 20:17

## 2024-12-15 RX ADMIN — SPIRONOLACTONE 25 MG: 25 TABLET, FILM COATED ORAL at 13:03

## 2024-12-15 RX ADMIN — ATORVASTATIN CALCIUM 80 MG: 80 TABLET, FILM COATED ORAL at 17:52

## 2024-12-15 RX ADMIN — TICAGRELOR 90 MG: 90 TABLET ORAL at 08:29

## 2024-12-15 NOTE — PROGRESS NOTES
Progress Note - Cardiology   Name: Benjamin W Kocher 49 y.o. male I MRN: [id number]  Unit/Bed#: [facility] I Date of Admission: 12/14/2024   Date of Service: 12/15/2024 I Hospital Day: 1     Assessment & Plan  STEMI (ST elevation myocardial infarction) (HCC)  - Initially presented with left-sided chest pain  - EKG demonstrated ST elevations in V1, V2 and ST depressions in 2, 3, aVF, V5 and V6  - Troponin trend: 5, 841, 6113  - Loaded with aspirin and ticagrelor and transferred from [place] to [facility] for emergent cath  - Left heart cath demonstrated: Ostial LAD to proximal LAD 40%, 100% D2, 99% mid LAD, 50% first marginal, 90% mid RCA.  Patient received ABBI x 1 to mid LAD which was culprit as well as mid RCA.  LVEDP was 26 mmHg  -Complete echo demonstrated mildly dilated LV with mildly increased wall thickness and EF of 40 to 45% with akinesis of apical septal, apical inferior and apical segments and hypokinesis of basal anterior septal, mid anterior, mid anteroseptal, mid inferoseptal, apical anterior and apical lateral segments.  Trace MR and mild TR.  - Lipid panel demonstrated total cholesterol 185, triglycerides 135, HDL 43 and   - A1c 6.1  - TSH 4.54 with free T4 of 0.9  - Patient continues on aspirin, atorvastatin 80, isosorbide mononitrate 30 mg daily, metoprolol titrate 12.5 mg twice daily and ticagrelor 90 mg twice daily  - Ticagrelor price check $25 a month    Heart failure with mid-range ejection fraction (HCC)  Wt Readings from Last 3 Encounters:   12/14/24 96.2 kg (212 lb)   12/13/24 94.8 kg (209 lb)   06/16/24 98.9 kg (218 lb 0.6 oz)   - Etiology: Ischemic  - Intake and output: Net -2.4 L over the last 24 hours however this is with only 420 cc of intake noted  - Echo 12/14/2024 demonstrated mildly dilated with mild concentric hypertrophy, EF 40 to 45% with akinesis of apical segments and hypokinesis of anterior and inferior wall.  Neurohormonal Blockade:  --Beta-Blocker: Currently on metoprolol  tartrate 12.5 mg twice daily  --ACEi, ARB or ARNi: start losartan 25 mg daily. Entresto requires prior auth  --SVR reduction: Currently on Imdur 30 mg daily  --Aldosterone Receptor Blocker: Not currently on, will decrease Imdur and start adding GDMT  --SGLT2 Inhibitor: requires prior auth  --Diuretic: Not currently on however LVEDP was elevated at cath     Sudden Cardiac Death Risk Reduction:  --ICD: EF greater than 35%     Electrical Resynchronization:  --Candidacy for BiV device: Narrow QRS      HLD (hyperlipidemia)  - Lipid panel demonstrated total cholesterol 185, triglycerides 135, HDL 43 and   - Goal LDL <55  - continue atorvastatin 80 mg daily     Prediabetes  - A1C 6.0   - will discuss lifestyle changes     Plan:   - Given patient's heart failure we will uptitrate GDMT.  - Discontinue Imdur to allow blood pressure room for GDMT given that angina has subsided  - Transition metoprolol to tartrate to metoprolol succinate starting tomorrow with 25 mg daily  - Start spironolactone 25 mg daily today  - Start losartan 25 mg daily tomorrow.  Starting losartan as Entresto requires prior authorization and will be arranged as an outpatient  - SGLT2 requires prior authorization as well and can be addressed as an outpatient  - Discussed with patient that post MI we generally keep patients for 48 to 72 hours meaning he will be discharged tomorrow if everything goes well.  - Cardiac rehab on discharge   - 1 week follow up will be arranged     Subjective     No overnight events.  Patient did have short run of NSVT and AIVR on telemetry overnight.  He was asymptomatic.  He denies chest pain, palpitations, shortness of breath, nausea, vomiting, diarrhea. Patient expressed frustration that he is not being discharged today. I explained that per guidelines we like to monitor patients for 48-72 hours post PCI.     Objective :  Temp:  [97.5 °F (36.4 °C)-97.9 °F (36.6 °C)] 97.9 °F (36.6 °C)  HR:  [57-76] 76  BP:  (104-134)/(58-81) 116/63  Resp:  [14-28] 20  SpO2:  [91 %-95 %] 94 %  O2 Device: None (Room air)  Orthostatic Blood Pressures      Flowsheet Row Most Recent Value   Blood Pressure 116/63 filed at 12/15/2024 1120   Patient Position - Orthostatic VS Sitting filed at 12/15/2024 1120          First Weight: Weight - Scale: 96.4 kg (212 lb 8.4 oz) (12/14/24 0109)  Vitals:    12/14/24 0109 12/14/24 1036   Weight: 96.4 kg (212 lb 8.4 oz) 96.2 kg (212 lb)     Physical Exam  General Appearance:    Alert, cooperative, no distress, appears stated age   Head:    Normocephalic, without obvious abnormality, atraumatic   Eyes:    PERRL, conjunctiva/corneas clear, EOM's intact      Neck:   Supple, symmetrical, trachea midline, no JVD   Lungs:   Currently on room air, no acute respiratory distress   Chest wall:    No tenderness or deformity.    Heart:  Regular rate and rhythm.  S1 and S2 normal, no murmur, rub or gallop   Abdomen:     Soft, non-tender, bowel sounds active all four quadrants   Extremities:   Extremities normal, atraumatic, no cyanosis or edema.  Right radial cath site bandaged.   Pulses:   2+ and symmetric all extremities   Neurologic:  Cooperates with exam.  Moves all 4 extremities spontaneously.  Awake alert and oriented x3.        Lab Results: I have reviewed the following results:CBC/BMP:   .     12/15/24  1214   WBC 11.08*   HGB 13.7   HCT 39.8       , Creatinine Clearance: Estimated Creatinine Clearance: 113 mL/min (by C-G formula based on SCr of 0.91 mg/dL)., LFTs: No new results in last 24 hours. , PTT/INR:No new results in last 24 hours. , Troponin,BNP:No new results in last 24 hours.   Results from last 7 days   Lab Units 12/14/24  0414 12/14/24  0159 12/13/24  2304   WBC Thousand/uL 16.93*  --  16.07*   HEMOGLOBIN g/dL 13.0  --  14.8   HEMATOCRIT % 37.9  --  41.8   PLATELETS Thousands/uL 263 237 291     Results from last 7 days   Lab Units 12/14/24  0414 12/13/24  5094   POTASSIUM mmol/L 4.0 3.6    CHLORIDE mmol/L 105 104   CO2 mmol/L 23 22   BUN mg/dL 13 14   CREATININE mg/dL 0.91 1.13   CALCIUM mg/dL 7.5* 9.0     Results from last 7 days   Lab Units 12/13/24  2308   INR  1.07   PTT seconds 23     Lab Results   Component Value Date    HGBA1C 6.1 (H) 12/13/2024     Lab Results   Component Value Date    TROPONINI <0.02 06/17/2020

## 2024-12-15 NOTE — PLAN OF CARE
Problem: PAIN - ADULT  Goal: Verbalizes/displays adequate comfort level or baseline comfort level  Description: Interventions:  - Encourage patient to monitor pain and request assistance  - Assess pain using appropriate pain scale  - Administer analgesics based on type and severity of pain and evaluate response  - Implement non-pharmacological measures as appropriate and evaluate response  - Consider cultural and social influences on pain and pain management  - Notify physician/advanced practitioner if interventions unsuccessful or patient reports new pain  Outcome: Progressing     Problem: INFECTION - ADULT  Goal: Absence or prevention of progression during hospitalization  Description: INTERVENTIONS:  - Assess and monitor for signs and symptoms of infection  - Monitor lab/diagnostic results  - Monitor all insertion sites, i.e. indwelling lines, tubes, and drains  - Monitor endotracheal if appropriate and nasal secretions for changes in amount and color  - Orange appropriate cooling/warming therapies per order  - Administer medications as ordered  - Instruct and encourage patient and family to use good hand hygiene technique  - Identify and instruct in appropriate isolation precautions for identified infection/condition  Outcome: Progressing  Goal: Absence of fever/infection during neutropenic period  Description: INTERVENTIONS:  - Monitor WBC    Outcome: Progressing     Problem: SAFETY ADULT  Goal: Patient will remain free of falls  Description: INTERVENTIONS:  - Educate patient/family on patient safety including physical limitations  - Instruct patient to call for assistance with activity   - Consult OT/PT to assist with strengthening/mobility   - Keep Call bell within reach  - Keep bed low and locked with side rails adjusted as appropriate  - Keep care items and personal belongings within reach  - Initiate and maintain comfort rounds  - Make Fall Risk Sign visible to staff  - Offer Toileting every 2 Hours,  in advance of need  - Initiate/Maintain bed alarm  - Apply yellow socks and bracelet for high fall risk patients  - Consider moving patient to room near nurses station  Outcome: Progressing  Goal: Maintain or return to baseline ADL function  Description: INTERVENTIONS:  -  Assess patient's ability to carry out ADLs; assess patient's baseline for ADL function and identify physical deficits which impact ability to perform ADLs (bathing, care of mouth/teeth, toileting, grooming, dressing, etc.)  - Assess/evaluate cause of self-care deficits   - Assess range of motion  - Assess patient's mobility; develop plan if impaired  - Assess patient's need for assistive devices and provide as appropriate  - Encourage maximum independence but intervene and supervise when necessary  - Involve family in performance of ADLs  - Assess for home care needs following discharge   - Consider OT consult to assist with ADL evaluation and planning for discharge  - Provide patient education as appropriate  Outcome: Progressing  Goal: Maintains/Returns to pre admission functional level  Description: INTERVENTIONS:  - Perform AM-PAC 6 Click Basic Mobility/ Daily Activity assessment daily.  - Set and communicate daily mobility goal to care team and patient/family/caregiver.   - Collaborate with rehabilitation services on mobility goals if consulted  - Perform Range of Motion 4 times a day.  - Reposition patient every 2 hours.  - Dangle patient 4 times a day  - Stand patient 4 times a day  - Ambulate patient 4 times a day  - Out of bed to chair 4 times a day   - Out of bed for meals 3 times a day  - Out of bed for toileting  - Record patient progress and toleration of activity level   Outcome: Progressing     Problem: DISCHARGE PLANNING  Goal: Discharge to home or other facility with appropriate resources  Description: INTERVENTIONS:  - Identify barriers to discharge w/patient and caregiver  - Arrange for needed discharge resources and  transportation as appropriate  - Identify discharge learning needs (meds, wound care, etc.)  - Arrange for interpretive services to assist at discharge as needed  - Refer to Case Management Department for coordinating discharge planning if the patient needs post-hospital services based on physician/advanced practitioner order or complex needs related to functional status, cognitive ability, or social support system  Outcome: Progressing     Problem: Knowledge Deficit  Goal: Patient/family/caregiver demonstrates understanding of disease process, treatment plan, medications, and discharge instructions  Description: Complete learning assessment and assess knowledge base.  Interventions:  - Provide teaching at level of understanding  - Provide teaching via preferred learning methods  Outcome: Progressing     Problem: Prexisting or High Potential for Compromised Skin Integrity  Goal: Skin integrity is maintained or improved  Description: INTERVENTIONS:  - Identify patients at risk for skin breakdown  - Assess and monitor skin integrity  - Assess and monitor nutrition and hydration status  - Monitor labs   - Assess for incontinence   - Turn and reposition patient  - Assist with mobility/ambulation  - Relieve pressure over bony prominences  - Avoid friction and shearing  - Provide appropriate hygiene as needed including keeping skin clean and dry  - Evaluate need for skin moisturizer/barrier cream  - Collaborate with interdisciplinary team   - Patient/family teaching  - Consider wound care consult   Outcome: Progressing

## 2024-12-15 NOTE — ASSESSMENT & PLAN NOTE
- Lipid panel demonstrated total cholesterol 185, triglycerides 135, HDL 43 and   - Goal LDL <55  - continue atorvastatin 80 mg daily

## 2024-12-15 NOTE — ASSESSMENT & PLAN NOTE
Wt Readings from Last 3 Encounters:   12/14/24 96.2 kg (212 lb)   12/13/24 94.8 kg (209 lb)   06/16/24 98.9 kg (218 lb 0.6 oz)   - Etiology: Ischemic  - Intake and output: Net -2.4 L over the last 24 hours however this is with only 420 cc of intake noted  - Echo 12/14/2024 demonstrated mildly dilated with mild concentric hypertrophy, EF 40 to 45% with akinesis of apical segments and hypokinesis of anterior and inferior wall.  Neurohormonal Blockade:  --Beta-Blocker: Currently on metoprolol tartrate 12.5 mg twice daily  --ACEi, ARB or ARNi: start losartan 25 mg daily. Entresto requires prior auth  --SVR reduction: Currently on Imdur 30 mg daily  --Aldosterone Receptor Blocker: Not currently on, will decrease Imdur and start adding GDMT  --SGLT2 Inhibitor: requires prior auth  --Diuretic: Not currently on however LVEDP was elevated at cath     Sudden Cardiac Death Risk Reduction:  --ICD: EF greater than 35%     Electrical Resynchronization:  --Candidacy for BiV device: Narrow QRS

## 2024-12-15 NOTE — ASSESSMENT & PLAN NOTE
- Initially presented with left-sided chest pain  - EKG demonstrated ST elevations in V1, V2 and ST depressions in 2, 3, aVF, V5 and V6  - Troponin trend: 5, 841, 6113  - Loaded with aspirin and ticagrelor and transferred from [place] to [facility]"" for emergent cath  - Left heart cath demonstrated: Ostial LAD to proximal LAD 40%, 100% D2, 99% mid LAD, 50% first marginal, 90% mid RCA.  Patient received ABBI x 1 to mid LAD which was culprit as well as mid RCA.  LVEDP was 26 mmHg  -Complete echo demonstrated mildly dilated LV with mildly increased wall thickness and EF of 40 to 45% with akinesis of apical septal, apical inferior and apical segments and hypokinesis of basal anterior septal, mid anterior, mid anteroseptal, mid inferoseptal, apical anterior and apical lateral segments.  Trace MR and mild TR.  - Lipid panel demonstrated total cholesterol 185, triglycerides 135, HDL 43 and   - A1c 6.1  - TSH 4.54 with free T4 of 0.9  - Patient continues on aspirin, atorvastatin 80, isosorbide mononitrate 30 mg daily, metoprolol titrate 12.5 mg twice daily and ticagrelor 90 mg twice daily  - Ticagrelor price check $25 a month

## 2024-12-16 ENCOUNTER — TELEPHONE (OUTPATIENT)
Dept: CARDIOLOGY CLINIC | Facility: CLINIC | Age: 49
End: 2024-12-16

## 2024-12-16 VITALS
RESPIRATION RATE: 18 BRPM | TEMPERATURE: 98.2 F | OXYGEN SATURATION: 94 % | HEART RATE: 76 BPM | HEIGHT: 70 IN | DIASTOLIC BLOOD PRESSURE: 83 MMHG | BODY MASS INDEX: 30.35 KG/M2 | WEIGHT: 212 LBS | SYSTOLIC BLOOD PRESSURE: 135 MMHG

## 2024-12-16 LAB
ANION GAP SERPL CALCULATED.3IONS-SCNC: 9 MMOL/L (ref 4–13)
BUN SERPL-MCNC: 14 MG/DL (ref 5–25)
CALCIUM SERPL-MCNC: 9.2 MG/DL (ref 8.4–10.2)
CHLORIDE SERPL-SCNC: 106 MMOL/L (ref 96–108)
CO2 SERPL-SCNC: 24 MMOL/L (ref 21–32)
CREAT SERPL-MCNC: 1.16 MG/DL (ref 0.6–1.3)
ERYTHROCYTE [DISTWIDTH] IN BLOOD BY AUTOMATED COUNT: 14.4 % (ref 11.6–15.1)
GFR SERPL CREATININE-BSD FRML MDRD: 73 ML/MIN/1.73SQ M
GLUCOSE SERPL-MCNC: 108 MG/DL (ref 65–140)
HCT VFR BLD AUTO: 41.8 % (ref 36.5–49.3)
HGB BLD-MCNC: 14.7 G/DL (ref 12–17)
MCH RBC QN AUTO: 32.5 PG (ref 26.8–34.3)
MCHC RBC AUTO-ENTMCNC: 35.2 G/DL (ref 31.4–37.4)
MCV RBC AUTO: 93 FL (ref 82–98)
PLATELET # BLD AUTO: 274 THOUSANDS/UL (ref 149–390)
PMV BLD AUTO: 10.2 FL (ref 8.9–12.7)
POTASSIUM SERPL-SCNC: 3.9 MMOL/L (ref 3.5–5.3)
RBC # BLD AUTO: 4.52 MILLION/UL (ref 3.88–5.62)
SODIUM SERPL-SCNC: 139 MMOL/L (ref 135–147)
WBC # BLD AUTO: 11.14 THOUSAND/UL (ref 4.31–10.16)

## 2024-12-16 PROCEDURE — 99239 HOSP IP/OBS DSCHRG MGMT >30: CPT | Performed by: INTERNAL MEDICINE

## 2024-12-16 PROCEDURE — 80048 BASIC METABOLIC PNL TOTAL CA: CPT | Performed by: INTERNAL MEDICINE

## 2024-12-16 PROCEDURE — 85027 COMPLETE CBC AUTOMATED: CPT | Performed by: INTERNAL MEDICINE

## 2024-12-16 RX ORDER — METOPROLOL SUCCINATE 25 MG/1
25 TABLET, EXTENDED RELEASE ORAL DAILY
Qty: 30 TABLET | Refills: 0 | Status: SHIPPED | OUTPATIENT
Start: 2024-12-17 | End: 2024-12-24

## 2024-12-16 RX ORDER — SPIRONOLACTONE 25 MG/1
25 TABLET ORAL DAILY
Qty: 30 TABLET | Refills: 0 | Status: SHIPPED | OUTPATIENT
Start: 2024-12-17 | End: 2024-12-24 | Stop reason: DRUGHIGH

## 2024-12-16 RX ORDER — ALBUTEROL SULFATE 90 UG/1
2 INHALANT RESPIRATORY (INHALATION) EVERY 4 HOURS PRN
Qty: 6.7 G | Refills: 0 | Status: SHIPPED | OUTPATIENT
Start: 2024-12-16

## 2024-12-16 RX ORDER — ATORVASTATIN CALCIUM 80 MG/1
80 TABLET, FILM COATED ORAL
Qty: 30 TABLET | Refills: 0 | Status: SHIPPED | OUTPATIENT
Start: 2024-12-16

## 2024-12-16 RX ORDER — LIDOCAINE 50 MG/G
1 PATCH TOPICAL DAILY
Qty: 30 PATCH | Refills: 0 | Status: SHIPPED | OUTPATIENT
Start: 2024-12-17

## 2024-12-16 RX ORDER — ASPIRIN 81 MG/1
81 TABLET ORAL DAILY
Qty: 30 TABLET | Refills: 0 | Status: SHIPPED | OUTPATIENT
Start: 2024-12-17

## 2024-12-16 RX ORDER — LOSARTAN POTASSIUM 25 MG/1
25 TABLET ORAL DAILY
Qty: 30 TABLET | Refills: 0 | Status: SHIPPED | OUTPATIENT
Start: 2024-12-17 | End: 2024-12-24

## 2024-12-16 RX ORDER — PANTOPRAZOLE SODIUM 40 MG/1
40 TABLET, DELAYED RELEASE ORAL
Qty: 30 TABLET | Refills: 0 | Status: SHIPPED | OUTPATIENT
Start: 2024-12-17

## 2024-12-16 RX ADMIN — METOPROLOL SUCCINATE 25 MG: 25 TABLET, EXTENDED RELEASE ORAL at 08:34

## 2024-12-16 RX ADMIN — SPIRONOLACTONE 25 MG: 25 TABLET, FILM COATED ORAL at 08:34

## 2024-12-16 RX ADMIN — TICAGRELOR 90 MG: 90 TABLET ORAL at 08:34

## 2024-12-16 RX ADMIN — LOSARTAN POTASSIUM 25 MG: 25 TABLET, FILM COATED ORAL at 08:34

## 2024-12-16 RX ADMIN — ASPIRIN 81 MG: 81 TABLET, COATED ORAL at 08:34

## 2024-12-16 NOTE — DISCHARGE SUMMARY
CARDIOLOGY DISCHARGE SUMMARY     Benjamin W Kocher   49 y.o. male  MRN: [id number]  Room/Bed: [place] [address] [address] [place] [address]    Encounter: [id number]    Principal Problem:    STEMI (ST elevation myocardial infarction) (HCC)  Active Problems:    HLD (hyperlipidemia)    Prediabetes    Heart failure with mid-range ejection fraction (HCC)      No new Assessment & Plan notes have been filed under this hospital service since the last note was generated.  Service: Cardiology    Assessment & Plan  STEMI (ST elevation myocardial infarction) (HCC)  - Initially presented with left-sided chest pain  - EKG demonstrated ST elevations in V1, V2 and ST depressions in 2, 3, aVF, V5 and V6  - Troponin trend: 5, 841, 6113  - Loaded with aspirin and ticagrelor and transferred from [place] to [facility] for emergent cath  - Left heart cath demonstrated: Ostial LAD to proximal LAD 40%, 100% D2, 99% mid LAD, 50% first marginal, 90% mid RCA.  Patient received ABBI x 1 to mid LAD which was culprit as well as mid RCA.  LVEDP was 26 mmHg  -Complete echo demonstrated mildly dilated LV with mildly increased wall thickness and EF of 40 to 45% with akinesis of apical septal, apical inferior and apical segments and hypokinesis of basal anterior septal, mid anterior, mid anteroseptal, mid inferoseptal, apical anterior and apical lateral segments.  Trace MR and mild TR.  - Lipid panel demonstrated total cholesterol 185, triglycerides 135, HDL 43 and   - A1c 6.1  - TSH 4.54 with free T4 of 0.9  - Patient continues on aspirin, atorvastatin 80, metoprolol XL 25 mg daily  and ticagrelor 90 mg twice daily  - Ticagrelor price check $25 a month.  Patient was okay with it.  -Consider prior Auth for Entresto and Jardiance in the outpatient setting.  -Message sent to outpatient cardiology clinic for 1 week follow-up.  -Activity restriction and weight lifting restriction provided to the patient.  Work note also  provided.  - C/w spironolactone and losartan as part of GDMT.  - Pt chest pain free. Will plan for discharge today.      Heart failure with mid-range ejection fraction (HCC)  Wt Readings from Last 3 Encounters:   12/14/24 96.2 kg (212 lb)   12/13/24 94.8 kg (209 lb)   06/16/24 98.9 kg (218 lb 0.6 oz)   - Etiology: Ischemic  - Echo 12/14/2024 demonstrated mildly dilated with mild concentric hypertrophy, EF 40 to 45% with akinesis of apical segments and hypokinesis of anterior and inferior wall.  Neurohormonal Blockade:  --Beta-Blocker: Currently on metoprolol   --ACEi, ARB or ARNi: start losartan 25 mg daily. Entresto requires prior auth  --SVR reduction: Losartan  --Aldosterone Receptor Blocker: started  --SGLT2 Inhibitor: requires prior auth  --Diuretic: Not currently on      Sudden Cardiac Death Risk Reduction:  --ICD: EF greater than 35%     Electrical Resynchronization:  --Candidacy for BiV device: Narrow QRS      HLD (hyperlipidemia)  - Lipid panel demonstrated total cholesterol 185, triglycerides 135, HDL 43 and   - Goal LDL <55  - continue atorvastatin 80 mg daily     Prediabetes  - A1C 6.0   - will discuss lifestyle changes         DETAILS OF HOSPITAL COURSE     This is 49-year-old male with past medical history of prediabetes, hyperlipidemia, former smoker who initially presented to [facility] with concern for chest pain.  Pain was left-sided, sudden onset, 7 out of 10 in intensity.  EKG showed ST elevation in V1 and V2 with depressions in the inferior and lateral leads.  STEMI alert was called and the patient was loaded with aspirin and Brilinta.  He was eventually [facility] emergent cardiac [facility].  Patient had ABBI x 2 to his LAD and RCA.  Patient did well post cath without any chest pain or shortness of breath with ambulation or even at rest.  Echocardiogram showed EF of 40 to 45% with wall motion abnormalities in LAD territory.  He was started on GDMT with spironolactone  and losartan.  Entresto and Jardiance will require prior Auth which will be done in the outpatient setting.  On 12/16 morning, patient was discharged to home with plan to follow-up with outpatient cardiology clinic within 1 week.  Message was sent to outpatient clinic (over the weekend) for follow-up in 1 week.  Patient was also provided work letter that he asked for. Was referred to cardiac rehab. He was advised to be compliant with his DAPT with ASA and Brilitna.  His Brilinta cost was only $25 per month which he was able to afford.        Physical Exam  Vitals reviewed.   Constitutional:       General: He is not in acute distress.     Appearance: Normal appearance. He is well-developed. He is not diaphoretic.   Cardiovascular:      Rate and Rhythm: Normal rate and regular rhythm.      Heart sounds: Normal heart sounds. No murmur heard.     No friction rub.   Pulmonary:      Effort: Pulmonary effort is normal. No respiratory distress.      Breath sounds: Normal breath sounds. No stridor. No wheezing.   Abdominal:      General: Bowel sounds are normal. There is no distension.      Palpations: Abdomen is soft.      Tenderness: There is no abdominal tenderness. There is no guarding.   Neurological:      Mental Status: He is alert and oriented to person, place, and time.   Psychiatric:         Mood and Affect: Mood normal.         Behavior: Behavior normal.         Thought Content: Thought content normal.         Judgment: Judgment normal.        DISCHARGE INFORMATION     PCP at Discharge: Kirk Chakraborty MD      Admitting Provider: Ranjeet Jc MD  Admission Date: 12/14/2024    Discharge Provider: No att. providers found  Discharge Date: 12/16/24    Discharge Disposition: Home/Self Care  Discharge Condition: good  Discharge with Lines: no    Discharge Diet: cardiac diet and diabetic diet  Activity Restrictions: No strenuous exercise.  No heavy lifting of more than 10 pounds.  Test Results Pending at Discharge:  none    Discharge Diagnoses:  Principal Problem:    STEMI (ST elevation myocardial infarction) ([facility])  Active Problems:    HLD (hyperlipidemia)    Prediabetes    Heart failure with mid-range ejection fraction ([facility])  Resolved Problems:    * No resolved hospital problems. *      Consulting Providers:  none    Diagnostic & Therapeutic Procedures Performed:  No results found.    Code Status: Prior  Advance Directive & Living Will: <no information>  Power of :    POLST:      Medications:  Discharge Medication List as of 12/16/2024  9:40 AM        STOP taking these medications       acetaminophen (TYLENOL) 500 mg tablet Comments:   Reason for Stopping:         aspirin 81 mg chewable tablet Comments:   Reason for Stopping:         azelastine (ASTELIN) 0.1 % nasal spray Comments:   Reason for Stopping:         Clenpiq 10-3.5-12 MG-GM -GM/160ML SOLN Comments:   Reason for Stopping:         ibuprofen (MOTRIN) 600 mg tablet Comments:   Reason for Stopping:         nortriptyline (PAMELOR) 10 mg capsule Comments:   Reason for Stopping:             Discharge Medication List as of 12/16/2024  9:40 AM        START taking these medications    Details   albuterol (PROVENTIL HFA,VENTOLIN HFA) 90 mcg/act inhaler Inhale 2 puffs every 4 (four) hours as needed for wheezing, Starting Mon 12/16/2024, Normal      aspirin (ECOTRIN LOW STRENGTH) 81 mg EC tablet Take 1 tablet (81 mg total) by mouth daily, Starting Tue 12/17/2024, Normal      lidocaine (LIDODERM) 5 % Apply 1 patch topically over 12 hours daily Remove & Discard patch within 12 hours or as directed by MD, Starting Tue 12/17/2024, Normal      losartan (COZAAR) 25 mg tablet Take 1 tablet (25 mg total) by mouth daily, Starting Tue 12/17/2024, Normal      metoprolol succinate (TOPROL-XL) 25 mg 24 hr tablet Take 1 tablet (25 mg total) by mouth daily, Starting Tue 12/17/2024, Normal      pantoprazole (PROTONIX) 40 mg tablet Take 1 tablet (40 mg total) by mouth daily in the early  "morning, Starting Tue 12/17/2024, Normal      spironolactone (ALDACTONE) 25 mg tablet Take 1 tablet (25 mg total) by mouth daily, Starting Tue 12/17/2024, Normal           Discharge Medication List as of 12/16/2024  9:40 AM        CONTINUE these medications which have NOT CHANGED    Details   methocarbamol (ROBAXIN) 500 mg tablet Take 1 tablet (500 mg total) by mouth 2 (two) times a day, Starting Mon 6/17/2024, Normal             Allergies:  No Known Allergies    FOLLOW-UP     Jimy Shannon MD  Cardiology [phone] [address] [address]      Next Steps: Follow up in 1 week(s)  Instructions: We have sent message to our clinic to call you for a 1 week follow up.       Discharge Statement:   I spent 1 hour minutes discharging the patient. This time was spent on the day of discharge. I had direct contact with the patient on the day of discharge. Additional documentation is required if more than 30 minutes were spent on discharge.    Portions of the record may have been created with voice recognition software.  Occasional wrong word or \"sound a like\" substitutions may have occurred due to the inherent limitations of voice recognition software.  Read the chart carefully and recognize, using context, where substitutions have occurred.    ==  Jamir Palmer,   [facility]  Cardiology Fellow   "

## 2024-12-16 NOTE — UTILIZATION REVIEW
"Initial Clinical Review    Admission: Date/Time/Statement:   Admission Orders (From admission, onward)       Ordered        12/14/24 0008  Inpatient Admission  Once                          Orders Placed This Encounter   Procedures    Inpatient Admission     Standing Status:   Standing     Number of Occurrences:   1     Level of Care:   Level 1 Stepdown [13]     Estimated length of stay:   More than 2 Midnights     Certification:   I certify that inpatient services are medically necessary for this patient for a duration of greater than two midnights. See H&P and MD Progress Notes for additional information about the patient's course of treatment.     Initial Presentation: 49 y.o. male with PMHx includes prediabetes, HLD, former tobacco use, reportedly a prior \"heart attack\" in 2007 at the age of 31 with subsequent cath reportedly normal per chart review, who presented initially to [facility] then transferred to [facility], admitted Inpatient status dt STEMI.  Presented due to sudden onset of left-sided chest pain. Vitals on presentation afebrile, HR 76, /80, satting 100% on 2L NC. Initial trop 5, WBC of 16, CMP with potassium 3.6, Cr 1.13 and at baselineEKG with ST elevations in V1 and V2, depressions II, III, AVF, V5, V6. CXR shows signs of mild pulmonary congestion. STEMI alert was called. He was loaded with ASA 324mg, ticegrelor 180mg, and transfered via life flight to [facility] for emergent cath.     Plan:  Admit to Critical Care, Level 1 stepdown unit :  continue ASA and ticegrelor, IVF, statin, BB, order A1c, lipid panel, TSH, TTE in AM. Cardiac rehab on DC. Encourage tobacco cessation.     12/14 Cardiac Cath Report:  Impression    Ost LAD to Prox LAD lesion is 40% stenosed.    2nd Diag lesion is 100% stenosed.    Mid LAD lesion is 99% stenosed.    1st Mrg lesion is 50% stenosed.    Mid RCA lesion is 90% stenosed.     3v CAD  PCI mLAD (culprit) + PCI mRCA  Mildly elevated " LVEDP     Recommendation  DAPT  Aggressive lipid Rx     Date: 12/15   Day 2:  Patient did have short run of NSVT and AIVR on telemetry overnight. Patient expressed frustration that he is not being discharged today. Explained that per guidelines we like to monitor patients for 48-72 hours post PCI. Continue dual antiplatelet therapy, minimum 1 year.  Started on Imdur yesterday due to some residual chest pain which is no longer present.  We will discontinue this to help with room for goal-directed medical therapy.  Continue beta-blocker and statin, Toprol-XL, losartan and spironolactone.      Scheduled Medications:  aspirin, 81 mg, Oral, Daily  atorvastatin, 80 mg, Oral, Daily With Dinner  heparin (porcine), 5,000 Units, Subcutaneous, Q8H GRAHAM  lidocaine, 1 patch, Topical, Daily  losartan, 25 mg, Oral, Daily  metoprolol succinate, 25 mg, Oral, Daily  nitroglycerin, 1 inch, Topical, BID  pantoprazole, 40 mg, Oral, Early Morning  spironolactone, 25 mg, Oral, Daily  ticagrelor, 90 mg, Oral, Q12H GRAHAM      Continuous IV Infusions:   heparin (porcine) 25,000 units in 0.45% NaCl 250 mL infusion (premix)  Rate: 2.7-18 mL/hr Dose: 3-20 Units/kg/hr  Weight Dosing Info: 90 kg (Order-Specific)  Freq: Titrated Route: IV  Last Dose: Stopped (12/13/24 2346)  Start: 12/13/24 2345 End: 12/14/24 0004    sodium chloride 0.9 % infusion  Rate: 75 mL/hr Dose: 75 mL/hr  Freq: Continuous Route: IV  Last Dose: Stopped (12/14/24 1223)  Start: 12/14/24 0230 End: 12/14/24 1214        PRN Meds:  acetaminophen, 975 mg, Oral, Q8H PRN  albuterol, 2 puff, Inhalation, Q4H PRN  oxyCODONE, 2.5 mg, Oral, TID PRN       Triage Vitals   Temperature Pulse Respirations Blood Pressure SpO2 Pain Score   12/14/24 0146 12/14/24 0146 12/14/24 0146 12/14/24 0146 12/14/24 0146 12/14/24 0012   98.3 °F (36.8 °C) 73 16 125/73 93 % 6     Weight (last 2 days)       Date/Time Weight    12/14/24 1036 96.2 (212)    12/14/24 01:09:56 96.4 (212.52)            Vital Signs (last  3 days)       Date/Time Temp Pulse Resp BP MAP (mmHg) SpO2 Calculated FIO2 (%) - Nasal Cannula Nasal Cannula O2 Flow Rate (L/min) O2 Device Patient Position - Orthostatic VS Ava Coma Scale Score Pain    12/16/24 07:18:12 98.2 °F (36.8 °C) 76 -- 135/83 100 94 % -- -- -- -- -- --    12/16/24 07:17:40 98.2 °F (36.8 °C) 71 18 135/83 100 94 % -- -- -- -- -- --    12/16/24 00:33:21 98.5 °F (36.9 °C) 63 15 128/79 95 94 % -- -- None (Room air) -- -- No Pain    12/15/24 2100 -- -- -- -- -- -- -- -- -- -- 15 --    12/15/24 19:03:28 98.3 °F (36.8 °C) 72 16 127/71 90 95 % -- -- None (Room air) -- -- No Pain    12/15/24 1500 98.6 °F (37 °C) -- 18 106/64 81 -- -- -- -- Lying -- --    12/15/24 1200 -- 76 20 -- -- 94 % -- -- None (Room air) -- 15 No Pain    12/15/24 1120 97.9 °F (36.6 °C) 76 28 116/63 83 95 % -- -- None (Room air) Sitting -- --    12/15/24 0829 -- 70 18 120/78 94 95 % -- -- -- -- -- --    12/15/24 0800 -- 72 14 -- -- 94 % -- -- None (Room air) -- 15 No Pain    12/15/24 0600 -- 58 18 108/73 86 91 % -- -- -- -- -- --    12/15/24 0500 -- -- -- -- -- -- -- -- -- -- 15 --    12/15/24 0400 -- 64 19 104/66 79 92 % -- -- -- -- -- --    12/15/24 0308 97.5 °F (36.4 °C) 72 24 118/69 87 93 % -- -- -- Lying -- --    12/15/24 0200 97.8 °F (36.6 °C) 76 15 120/75 93 93 % -- -- -- -- -- --    12/15/24 0100 -- -- -- -- -- -- -- -- -- -- 15 --    12/15/24 0000 -- 60 16 110/58 78 93 % -- -- -- -- -- --    12/14/24 2300 -- 66 16 114/65 83 93 % -- -- -- -- -- --    12/14/24 2200 -- 66 17 118/67 87 91 % -- -- -- -- -- --    12/14/24 2100 97.8 °F (36.6 °C) 65 16 113/68 85 94 % -- -- None (Room air) -- 15 --    12/14/24 2000 -- 67 16 126/79 98 94 % -- -- -- -- -- --    12/14/24 1900 -- 67 15 124/59 85 94 % -- -- -- -- -- --    12/14/24 1700 -- 69 15 134/81 102 95 % -- -- -- -- -- --    12/14/24 1600 -- -- -- -- -- -- -- -- None (Room air) -- 15 --    12/14/24 1546 -- -- -- -- -- -- -- -- -- -- -- 6    12/14/24 1534 -- 57 15 120/75 93  -- -- -- None (Room air) Lying -- --    12/14/24 1300 -- 63 15 123/75 93 93 % -- -- -- -- -- --    12/14/24 1223 -- 54 14 105/67 82 96 % 28 2 L/min Nasal cannula -- -- --    12/14/24 1200 -- -- -- -- -- -- -- -- -- -- 15 --    12/14/24 1130 -- 63 26 117/72 89 93 % -- -- -- -- -- --    12/14/24 1100 -- 62 18 109/72 85 96 % -- -- -- -- -- --    12/14/24 1036 -- 58 17 111/73 88 94 % -- -- -- -- -- 6    12/14/24 1000 -- 63 16 106/68 83 95 % -- -- -- -- -- --    12/14/24 0942 -- 54 15 108/70 84 96 % -- -- -- -- -- --    12/14/24 0900 -- 64 25 109/63 81 93 % -- -- -- -- -- --    12/14/24 0854 -- 64 22 110/67 83 93 % -- -- -- -- -- --    12/14/24 0849 -- 64 20 114/66 85 94 % -- -- -- -- -- --    12/14/24 0842 -- 63 28 106/69 83 95 % 36 4 L/min Nasal cannula -- 15 6    12/14/24 0700 -- 72 17 102/72 83 93 % -- -- -- -- -- --    12/14/24 0639 97.5 °F (36.4 °C) 73 26 -- -- 92 % -- -- -- -- -- --    12/14/24 0600 -- 63 10 96/64 76 91 % -- -- -- -- -- --    12/14/24 0500 -- 67 16 103/70 82 93 % 36 4 L/min Nasal cannula -- -- --    12/14/24 0415 -- -- -- -- -- -- -- -- -- -- 15 --    12/14/24 0400 -- 71 16 109/69 82 90 % -- -- -- -- -- --    12/14/24 0300 -- 72 13 116/72 87 91 % -- -- -- -- -- --    12/14/24 0245 97.4 °F (36.3 °C) 75 15 -- -- 86 % -- -- -- -- -- --    12/14/24 0230 -- -- -- -- -- 90 % -- -- -- -- -- --    12/14/24 0200 -- 72 19 112/68 85 96 % -- -- -- -- 15 --    12/14/24 0146 98.3 °F (36.8 °C) 73 16 125/73 88 93 % -- -- -- -- -- --    12/14/24 01:09:56 -- -- -- -- -- -- -- -- -- -- -- 5    12/14/24 00:12:39 -- -- -- -- -- -- -- -- -- -- -- 6    12/14/24 00:12:23 -- -- -- -- -- -- 28 2 L/min Nasal cannula -- -- --              Pertinent Labs/Diagnostic Test Results:   Radiology:  No orders to display     Cardiology:  Echo complete w/ contrast if indicated   Final Result by Dayron Gill MD (12/14 1650)        Left Ventricle: Left ventricular cavity size is mildly dilated. Wall    thickness is mildly increased.  The left ventricular ejection fraction is    40-45%. Systolic function is normal. Diastolic function is normal.     The following segments are akinetic: apical septal, apical inferior and    apex.     The following segments are hypokinetic: basal anteroseptal, mid    anterior, mid anteroseptal, mid inferoseptal, apical anterior and apical    lateral.     All other segments are normal.     Mitral Valve: There is trace regurgitation.     Tricuspid Valve: There is mild regurgitation.         Cardiac catheterization   Preliminary Result by Ranjeet Jc MD (12/14 0109)    **           GI:  No orders to display           Results from last 7 days   Lab Units 12/16/24  0528 12/15/24  1214 12/14/24  0414 12/14/24  0159 12/13/24  2304   WBC Thousand/uL 11.14* 11.08* 16.93*  --  16.07*   HEMOGLOBIN g/dL 14.7 13.7 13.0  --  14.8   HEMATOCRIT % 41.8 39.8 37.9  --  41.8   PLATELETS Thousands/uL 274 264 263   < > 291   TOTAL NEUT ABS Thousands/µL  --  8.05* 12.28*  --   --     < > = values in this interval not displayed.         Results from last 7 days   Lab Units 12/16/24  0528 12/15/24  1214 12/14/24  0414 12/13/24  2304   SODIUM mmol/L 139 137 134* 135   POTASSIUM mmol/L 3.9 3.9 4.0 3.6   CHLORIDE mmol/L 106 107 105 104   CO2 mmol/L 24 24 23 22   ANION GAP mmol/L 9 6 6 9   BUN mg/dL 14 11 13 14   CREATININE mg/dL 1.16 0.98 0.91 1.13   EGFR ml/min/1.73sq m 73 89 97 75   CALCIUM mg/dL 9.2 8.8 7.5* 9.0   MAGNESIUM mg/dL  --   --  1.9 2.0     Results from last 7 days   Lab Units 12/14/24 0414 12/13/24  2304   AST U/L 29 23   ALT U/L 29 33   ALK PHOS U/L 95 108*   TOTAL PROTEIN g/dL 5.8* 6.8   ALBUMIN g/dL 3.3* 4.1   TOTAL BILIRUBIN mg/dL 0.37 0.36         Results from last 7 days   Lab Units 12/16/24  0528 12/15/24  1214 12/14/24  0414 12/13/24  2304   GLUCOSE RANDOM mg/dL 108 115 118 124         Results from last 7 days   Lab Units 12/13/24  2304   HEMOGLOBIN A1C % 6.1*   EAG mg/dl 128       Results from last 7 days   Lab  Units 12/14/24  0414 12/14/24  0159 12/13/24  2304   HS TNI 0HR ng/L  --  841* 5   HS TNI 2HR ng/L 6,113*  --   --    HSTNI D2 ng/L 5,272*  --   --          Results from last 7 days   Lab Units 12/13/24  2308   PROTIME seconds 14.4   INR  1.07   PTT seconds 23     Results from last 7 days   Lab Units 12/13/24  2304   TSH 3RD GENERATON uIU/mL 4.540*       Past Medical History:   Diagnosis Date    COPD (chronic obstructive pulmonary disease) (HCC)     DVT of lower limb, acute (HCC)     MI (myocardial infarction) (HCC) 2007     Present on Admission:   STEMI (ST elevation myocardial infarction) (HCC)   HLD (hyperlipidemia)      Admitting Diagnosis: STEMI (ST elevation myocardial infarction) (HCC) [I21.3]  Age/Sex: 49 y.o. male    Network Utilization Review Department  ATTENTION: Please call with any questions or concerns to [phone] and carefully listen to the prompts so that you are directed to the right person. All voicemails are confidential.   For Discharge needs, contact Care Management DC Support Team at [phone] opt. 2  Send all requests for admission clinical reviews, approved or denied determinations and any other requests to dedicated fax number below belonging to the campus where the patient is receiving treatment. List of dedicated fax numbers for the Facilities:  FACILITY NAME UR FAX NUMBER   ADMISSION DENIALS (Administrative/Medical Necessity) [phone]   DISCHARGE SUPPORT TEAM (NETWORK) [phone]   PARENT CHILD HEALTH (Maternity/NICU/Pediatrics) [phone]   [facility] [phone]   [facility] [phone]   [facility] [phone]   [facility] [phone]   [facility] [phone]   [facility] [phone]   [facility] [phone]   [facility] [phone]   [facility]  [facility] [phone]   [facility] [phone]   [facility] [phone]   [facility] [phone]

## 2024-12-16 NOTE — TELEPHONE ENCOUNTER
----- Message from Gabrielle Padron DO sent at 12/15/2024 12:33 PM EST -----  Regarding: MI follow up  Good morning,     I saw this patient at the [facility], but he lives in [place]. He came in as a STEMI and will be discharged on 12/15/2024, can we please arrange for 1 week follow up for him?     Thank you,  Gabrielle Padron DO FY-2

## 2024-12-16 NOTE — ASSESSMENT & PLAN NOTE
- Initially presented with left-sided chest pain  - EKG demonstrated ST elevations in V1, V2 and ST depressions in 2, 3, aVF, V5 and V6  - Troponin trend: 5, 841, 6113  - Loaded with aspirin and ticagrelor and transferred from [place] to [facility] for emergent cath  - Left heart cath demonstrated: Ostial LAD to proximal LAD 40%, 100% D2, 99% mid LAD, 50% first marginal, 90% mid RCA.  Patient received ABBI x 1 to mid LAD which was culprit as well as mid RCA.  LVEDP was 26 mmHg  -Complete echo demonstrated mildly dilated LV with mildly increased wall thickness and EF of 40 to 45% with akinesis of apical septal, apical inferior and apical segments and hypokinesis of basal anterior septal, mid anterior, mid anteroseptal, mid inferoseptal, apical anterior and apical lateral segments.  Trace MR and mild TR.  - Lipid panel demonstrated total cholesterol 185, triglycerides 135, HDL 43 and   - A1c 6.1  - TSH 4.54 with free T4 of 0.9  - Patient continues on aspirin, atorvastatin 80, isosorbide mononitrate 30 mg daily, metoprolol titrate 12.5 mg twice daily and ticagrelor 90 mg twice daily  - Ticagrelor price check $25 a month

## 2024-12-16 NOTE — PROGRESS NOTES
Progress Note - Cardiology   Name: Benjamin W Kocher 49 y.o. male I MRN: [id number]  Unit/Bed#: PPHP-302-01 I Date of Admission: 12/14/2024   Date of Service: 12/16/2024 I Hospital Day: 2   { ?Quick Links I Problem List I PORCH I Billing Tip:08084}  Assessment & Plan  STEMI (ST elevation myocardial infarction) (HCC)  - Initially presented with left-sided chest pain  - EKG demonstrated ST elevations in V1, V2 and ST depressions in 2, 3, aVF, V5 and V6  - Troponin trend: 5, 841, 6113  - Loaded with aspirin and ticagrelor and transferred from [place] to [facility] for emergent cath  - Left heart cath demonstrated: Ostial LAD to proximal LAD 40%, 100% D2, 99% mid LAD, 50% first marginal, 90% mid RCA.  Patient received ABBI x 1 to mid LAD which was culprit as well as mid RCA.  LVEDP was 26 mmHg  -Complete echo demonstrated mildly dilated LV with mildly increased wall thickness and EF of 40 to 45% with akinesis of apical septal, apical inferior and apical segments and hypokinesis of basal anterior septal, mid anterior, mid anteroseptal, mid inferoseptal, apical anterior and apical lateral segments.  Trace MR and mild TR.  - Lipid panel demonstrated total cholesterol 185, triglycerides 135, HDL 43 and   - A1c 6.1  - TSH 4.54 with free T4 of 0.9  - Patient continues on aspirin, atorvastatin 80, isosorbide mononitrate 30 mg daily, metoprolol titrate 12.5 mg twice daily and ticagrelor 90 mg twice daily  - Ticagrelor price check $25 a month    Heart failure with mid-range ejection fraction (HCC)  Wt Readings from Last 3 Encounters:   12/14/24 96.2 kg (212 lb)   12/13/24 94.8 kg (209 lb)   06/16/24 98.9 kg (218 lb 0.6 oz)   - Etiology: Ischemic  - Intake and output: Net -2.4 L over the last 24 hours however this is with only 420 cc of intake noted  - Echo 12/14/2024 demonstrated mildly dilated with mild concentric hypertrophy, EF 40 to 45% with akinesis of apical segments and hypokinesis of anterior and inferior  wall.  Neurohormonal Blockade:  --Beta-Blocker: Currently on metoprolol tartrate 12.5 mg twice daily  --ACEi, ARB or ARNi: start losartan 25 mg daily. Entresto requires prior auth  --SVR reduction: Currently on Imdur 30 mg daily  --Aldosterone Receptor Blocker: Not currently on, will decrease Imdur and start adding GDMT  --SGLT2 Inhibitor: requires prior auth  --Diuretic: Not currently on however LVEDP was elevated at cath     Sudden Cardiac Death Risk Reduction:  --ICD: EF greater than 35%     Electrical Resynchronization:  --Candidacy for BiV device: Narrow QRS      HLD (hyperlipidemia)  - Lipid panel demonstrated total cholesterol 185, triglycerides 135, HDL 43 and   - Goal LDL <55  - continue atorvastatin 80 mg daily     Prediabetes  - A1C 6.0   - will discuss lifestyle changes       {MORRO LUND Progress note specialty templates:37845}

## 2024-12-17 NOTE — UTILIZATION REVIEW
NOTIFICATION OF ADMISSION DISCHARGE   This is a Notification of Discharge from [facility]. Please be advised that this patient has been discharge from our facility. Below you will find the admission and discharge date and time including the patient’s disposition.   UTILIZATION REVIEW CONTACT:  Paula Matos  Utilization   Network Utilization Review Department  Phone: [phone] x carefully listen to the prompts. All voicemails are confidential.  Email: [email or web address]     ADMISSION INFORMATION  PRESENTATION DATE: 12/14/2024 12:04 AM  OBERVATION ADMISSION DATE: N/A  INPATIENT ADMISSION DATE: 12/14/24 12:08 AM   DISCHARGE DATE: 12/16/2024 10:29 AM   DISPOSITION:Home/Self Care    Network Utilization Review Department  ATTENTION: Please call with any questions or concerns to [phone] and carefully listen to the prompts so that you are directed to the right person. All voicemails are confidential.   For Discharge needs, contact Care Management DC Support Team at [phone] opt. 2  Send all requests for admission clinical reviews, approved or denied determinations and any other requests to dedicated fax number below belonging to the campus where the patient is receiving treatment. List of dedicated fax numbers for the Facilities:  FACILITY NAME UR FAX NUMBER   ADMISSION DENIALS (Administrative/Medical Necessity) [phone]   DISCHARGE SUPPORT TEAM ([facility]) [phone]   PARENT CHILD HEALTH (Maternity/NICU/Pediatrics) [phone]   [facility] [phone]   [facility] [phone]   [facility] [phone]   [facility] [phone]   [facility] [phone]   [facility] [phone]   [facility] [phone]   [facility]  [phone]   [facility] [phone]   [facility] [phone]   [facility] [phone]   [facility] [phone]

## 2024-12-19 LAB
ATRIAL RATE: 59 BPM
ATRIAL RATE: 72 BPM
ATRIAL RATE: 86 BPM
P AXIS: 58 DEGREES
P AXIS: 59 DEGREES
P AXIS: 67 DEGREES
PR INTERVAL: 144 MS
PR INTERVAL: 144 MS
PR INTERVAL: 148 MS
QRS AXIS: 71 DEGREES
QRS AXIS: 72 DEGREES
QRS AXIS: 77 DEGREES
QRSD INTERVAL: 132 MS
QRSD INTERVAL: 132 MS
QRSD INTERVAL: 92 MS
QT INTERVAL: 374 MS
QT INTERVAL: 412 MS
QT INTERVAL: 420 MS
QTC INTERVAL: 415 MS
QTC INTERVAL: 447 MS
QTC INTERVAL: 451 MS
T WAVE AXIS: 44 DEGREES
T WAVE AXIS: 45 DEGREES
T WAVE AXIS: 63 DEGREES
VENTRICULAR RATE: 59 BPM
VENTRICULAR RATE: 72 BPM
VENTRICULAR RATE: 86 BPM

## 2024-12-19 PROCEDURE — 93010 ELECTROCARDIOGRAM REPORT: CPT | Performed by: INTERNAL MEDICINE

## 2024-12-24 ENCOUNTER — OFFICE VISIT (OUTPATIENT)
Dept: CARDIOLOGY CLINIC | Facility: CLINIC | Age: 49
End: 2024-12-24
Payer: COMMERCIAL

## 2024-12-24 VITALS
HEART RATE: 74 BPM | HEIGHT: 70 IN | SYSTOLIC BLOOD PRESSURE: 126 MMHG | DIASTOLIC BLOOD PRESSURE: 82 MMHG | WEIGHT: 213 LBS | OXYGEN SATURATION: 96 % | BODY MASS INDEX: 30.49 KG/M2

## 2024-12-24 DIAGNOSIS — E78.2 MIXED HYPERLIPIDEMIA: ICD-10-CM

## 2024-12-24 DIAGNOSIS — I50.22 HEART FAILURE WITH MID-RANGE EJECTION FRACTION (HCC): ICD-10-CM

## 2024-12-24 DIAGNOSIS — I21.3 STEMI (ST ELEVATION MYOCARDIAL INFARCTION) (HCC): ICD-10-CM

## 2024-12-24 DIAGNOSIS — I25.10 CORONARY ARTERY DISEASE INVOLVING NATIVE CORONARY ARTERY OF NATIVE HEART WITHOUT ANGINA PECTORIS: Primary | ICD-10-CM

## 2024-12-24 DIAGNOSIS — I25.5 ISCHEMIC CARDIOMYOPATHY: ICD-10-CM

## 2024-12-24 PROCEDURE — 99214 OFFICE O/P EST MOD 30 MIN: CPT | Performed by: INTERNAL MEDICINE

## 2024-12-24 RX ORDER — METOPROLOL SUCCINATE 50 MG/1
50 TABLET, EXTENDED RELEASE ORAL DAILY
Qty: 90 TABLET | Refills: 5 | Status: SHIPPED | OUTPATIENT
Start: 2024-12-24

## 2024-12-24 RX ORDER — LOSARTAN POTASSIUM 50 MG/1
50 TABLET ORAL DAILY
Qty: 90 TABLET | Refills: 5 | Status: SHIPPED | OUTPATIENT
Start: 2024-12-24

## 2024-12-24 NOTE — ASSESSMENT & PLAN NOTE
Reluctantly tolerating high intensity statin therapy.  I encouraged him to with continuation for now.

## 2024-12-24 NOTE — LETTER
December 24, 2024     Patient: Benjamin W Kocher  YOB: 1975  Date of Visit: 12/24/2024      To Whom it May Concern:    Benjamin Kocher is under my professional care. Alexx was seen in my office on 12/24/2024. Alexx will be seen again in mid February. He cannot return to full duty until after I seen him then.    If you have any questions or concerns, please don't hesitate to call.         Sincerely,          Jimy Shannon MD        CC: No Recipients

## 2024-12-24 NOTE — ASSESSMENT & PLAN NOTE
Cannot tell you what is scar on what is stands.  Will check another echo prior to return in late January.

## 2024-12-24 NOTE — PROGRESS NOTES
Patient ID: Benjamin W Kocher is a 49 y.o. male.        Plan:      Assessment & Plan  Coronary artery disease involving native coronary artery of native heart without angina pectoris  Stenting of the mid LAD and RCA in the setting of an acute anterior wall MI.  No symptoms since.  Mixed hyperlipidemia  Reluctantly tolerating high intensity statin therapy.  I encouraged him to with continuation for now.  Ischemic cardiomyopathy  Cannot tell you what is scar on what is stands.  Will check another echo prior to return in late January.      Follow up Plan/Other summary comments:  For now medication changes include increase metoprolol to 50 mg daily, increase losartan to 50 mg daily, and hold spironolactone.  Return in about 5 weeks (around 1/28/2025).    HPI: Patient is here for follow-up.  He was hospitalized for chest pain in the setting of ST elevation MI on 12/13/2024 and underwent stenting x 3 of 99% mid LAD stenosis and stent of 90% mid RCA stenosis.  Echo during that hospitalization revealed significant hypokinesia of the apex.  No similar symptomatology since hospital discharge.  He is anxious about what this means for the rest of his life.  He is to start cardiac rehab shortly.  He wants to go back to work in the [employer] unit of [employer] when he is stable.        Most recent or relevant cardiac/vascular testing:    TTE 12/14/2024: LVEF 40 to 45% with hypokinesia of the apex.      Past Surgical History:   Procedure Laterality Date    CARDIAC CATHETERIZATION N/A 12/13/2024    Procedure: Cardiac PCI;  Surgeon: Ranjeet Jc MD;  Location: [facility];  Service: Cardiology    CARDIAC CATHETERIZATION Left 12/13/2024    Procedure: Cardiac Left Heart Cath;  Surgeon: Ranjeet Jc MD;  Location: [facility];  Service: Cardiology    KNEE SURGERY  2010       Lipid Profile: Reviewed      Review of Systems   10  point ROS  was otherwise non pertinent or negative except as per HPI or as  "below.   Gait:  Normal.        Objective:     /82   Pulse 74   Ht 5' 10\" (1.778 m)   Wt 96.6 kg (213 lb)   SpO2 96%   BMI 30.56 kg/m²     PHYSICAL EXAM:    General:  Normal appearance in no distress.  Eyes:  Anicteric.  Oral mucosa:  Moist.  Neck:  No JVD. Carotid upstrokes are brisk without bruits.  No masses.  Chest:  Clear to auscultation.  Cardiac:  No palpable PMI.  Normal S1 and S2.  No murmur gallop or rub.  Abdomen:  Soft and nontender. No palpable organomegaly or aortic enlargement.  Extremities:  No peripheral edema.  Musculoskeletal:  Symmetric.   Vascular:  Femoral pulses are brisk without bruits.  Popliteal pulses are intact bilaterally.   Pedal pulses are intact.  Neuro:  Grossly symmetric.  Psych:  Alert and oriented x3.      Meds reviewed.    Past Medical History:   Diagnosis Date    COPD (chronic obstructive pulmonary disease) (HCC)     Coronary artery disease     DVT of lower limb, acute (HCC)     Hyperlipidemia     Ischemic cardiomyopathy     MI (myocardial infarction)     STEMI (ST elevation myocardial infarction)     anterior           Social History     Tobacco Use   Smoking Status Former    Current packs/day: 0.00    Average packs/day: 0.5 packs/day for 24.0 years (12.0 ttl pk-yrs)    Types: Cigarettes    Start date:     Quit date: 2018    Years since quittin.9   Smokeless Tobacco Current   Tobacco Comments    \"here and there a cigerette\"             "

## 2024-12-24 NOTE — PATIENT INSTRUCTIONS
Stop spironolactone.  Increase metoprolol to 50 mg daily.  Increase losartan to 50 mg daily.  I sent a prescription for both of these to your pharmacy.

## 2025-01-03 ENCOUNTER — CLINICAL SUPPORT (OUTPATIENT)
Dept: CARDIAC REHAB | Facility: HOSPITAL | Age: 50
End: 2025-01-03
Payer: COMMERCIAL

## 2025-01-03 DIAGNOSIS — I21.3 STEMI (ST ELEVATION MYOCARDIAL INFARCTION) (HCC): ICD-10-CM

## 2025-01-03 DIAGNOSIS — I21.3 ST ELEVATION MYOCARDIAL INFARCTION (STEMI), UNSPECIFIED ARTERY (HCC): Primary | ICD-10-CM

## 2025-01-03 PROCEDURE — 93797 PHYS/QHP OP CAR RHAB WO ECG: CPT

## 2025-01-03 NOTE — PROGRESS NOTES
CARDIAC REHABILITATION   ASSESSMENT AND INDIVIDUALIZED TREATMENT PLAN  INITIAL           Today's date: 1/3/2025   # of Exercise Sessions Completed: 1  Patient name: Benjamin W Kocher      : 1975  Age: 49 y.o.       MRN: [id number]  Referring Physician: Ranjeet Jc MD  Cardiologist: Jimy Shannon MD  Provider: [place]  Clinician: Erika Sacks, MS        Treatment is tailored to this patient's individual needs.  The ITP was reviewed with the patient and all questions were answered to their satisfaction.  Additional ITP documentation can be found electronically including daily and monthly exercise summaries, daily session notes with ECG summaries, education notes, daily medication reconciliation, and daily physician supervision.      Comments: Alexx was seen today for his initial evaluation to begin cardiac rehab post STEMI w/ intervention. He has remained asymptomatic since his event.    He completed an initial submax ETT reaching 5.8 METs with correct hemodynamic response. He was hypertensive upon arrival.     He is currently out of work and is looking forward to returning to work when he is cleared.     He will attend cardiac rehab 2-3x/week beginning 2024.        Dx: STEMI w/ intervention  Description of Diagnosis: ST elevation myocardial infarction involving left anterior descending (LAD) coronary artery with angioplasty x 3 and drug eluting stent x 3 to mid LAD and drug eluting stent x 1 to mid RCA  Date of onset: 2024  Other Cardiac History: Hx CVA, hx DVT, HLD, family hx        ASSESSMENT    Medical History:   Past Medical History:   Diagnosis Date    COPD (chronic obstructive pulmonary disease) (HCC)     Coronary artery disease     DVT of lower limb, acute (HCC)     Hyperlipidemia     Ischemic cardiomyopathy     MI (myocardial infarction)     STEMI (ST elevation myocardial infarction)     anterior       Family History:  Family History   Problem Relation Age of Onset    Hypertension  Mother     Heart attack Father 42            Heart failure Paternal Aunt         pacemaker    Heart disease Paternal Uncle     Heart failure Paternal Grandmother         pacemaker    Heart attack Paternal Grandfather         cabg    Heart disease Paternal Grandfather 62        heart transplant       Allergies:   Patient has no known allergies.    Current Medications:   Current Outpatient Medications   Medication Sig Dispense Refill    albuterol (PROVENTIL HFA,VENTOLIN HFA) 90 mcg/act inhaler Inhale 2 puffs every 4 (four) hours as needed for wheezing 6.7 g 0    aspirin (ECOTRIN LOW STRENGTH) 81 mg EC tablet Take 1 tablet (81 mg total) by mouth daily 30 tablet 0    atorvastatin (LIPITOR) 80 mg tablet Take 1 tablet (80 mg total) by mouth daily with dinner 30 tablet 0    lidocaine (LIDODERM) 5 % Apply 1 patch topically over 12 hours daily Remove & Discard patch within 12 hours or as directed by MD (Patient taking differently: Apply 1 patch topically if needed) 30 patch 0    losartan (COZAAR) 50 mg tablet Take 1 tablet (50 mg total) by mouth daily 90 tablet 5    metoprolol succinate (TOPROL-XL) 50 mg 24 hr tablet Take 1 tablet (50 mg total) by mouth daily 90 tablet 5    pantoprazole (PROTONIX) 40 mg tablet Take 1 tablet (40 mg total) by mouth daily in the early morning 30 tablet 0    ticagrelor (BRILINTA) 90 MG Take 1 tablet (90 mg total) by mouth every 12 (twelve) hours 60 tablet 0     No current facility-administered medications for this visit.       Medication compliance: Yes   Comments: Pt reports to be compliant with medications    Physical Limitations: None    Fall Risk: Low   Comments: Ambulates with a steady gait with no assist device    Cultural needs: none      CAD Risk Factors:  Cholesterol: Yes ()  HTN: No  DM: Pre-diabetes  Obesity: Yes (BMI 30.56 kg/m2)  Inactivity: No (No formal HEP but very active w/ work and hobbies)      EXERCISE ASSESSMENT:    Initial Fitness Assessment: Submax Treadmill  ETT  Rest: HR 70, /72, SpO2 98%, Asx  Exercise: HR 99, /84, SpO2 99%, Asx, 5.8 METs   Reason for Discontinuation: 30 above SBP  Recovery: HR 70, /68, SpO2 99%, Asx      ECG INTERPRETATION:  NSR    Current Functional Status:  Occupation: full time job, canine officer in [place]  Recreation/Physical Activity: [employer] [place]  ADL’s:No limitations  [place]: No limitations  Home exercise: No formal HEP but very active  Other Comments: None      SMART Exercise Goals:   10% improvement in functional capacity based on max METs achieved in initial fitness assessment  increased exercise capacity by 40% based on peak METs tolerated in cardiac rehab exercise session  maintain > 150 minutes per week of moderate intensity exercise    Patient Specific EXERCISE GOALS:       Return to work unrestricted  Begin a formal HEP  Have the ability to be able to complete hobbies w/o issue    Functional Capacity Screening Tool:  [facility] Activity Status Index:  6.61 METs      PSYCHOSOCIAL ASSESSMENT:    Date of last Assessment:  1/3/2024  Depression screening:  PHQ-9 = 2    Interpretation:  1-4 = Minimal Depression  Anxiety screening:  JOO-7 = 1    Interpretation: 0-4  = Not anxious    Pt self-report of depression and anxiety   Patient reports they are coping well with good social support and denies depression or anxiety    Self-reported stress level:  3/10   Stressors:  Health, work  Stress Management Tools: practice Relaxation Techniques, spend time outside, and enjoy a hobby    SMART Psychosocial Goals:     Reduce perceived stress to 1-3/10, improved [facility] QOL < 27, PHQ-9 - reduced severity by one level, improved sleeping habits, feel less tired with more energy, and Improved appetite control    Patient Specific PSYCHOCOSOCIAL GOALS:    Use hobbies as stress relief  Spend time with family and friends    Quality of Life Screen:  (Higher score indicates disease impact on QOL)  [facility] COOP score: 28/45     Social  "Support:   siblings and friends  Community/Social Activities: Spend time with friends and family     Psychosocial Assessment as it relates to rehabilitation:   Patient denies issues with his/her family or home life that may affect their rehabilitation efforts.       NUTRITION ASSESSMENT:    Initial Weight:  213  Current Weight:     Height:   Ht Readings from Last 1 Encounters:   12/24/24 5' 10\" (1.778 m)       Rate Your Plate Score: 47/81    Diabetes: Pre-diabetes  A1c: 6.1    last measured: 12/13/2024    Lipid management: Discussed diet and lipid management and Last lipid profile 12/13/2024  Chol 185    HDL 43      Current Dietary Habits:  Patient is currently trying to lose weight. He is cutting back on his [place] iced tea intake. He is trying to make healthier choices.     SMART Nutrition Goals:   reduced BMI to < 25, LDL <100, fasting BG , Improved Rate Your Plate score  >58, and 2.5-5%  wt loss    Patient Specific NUTRITION GOALS:     1. Drink less iced tea   2. Watch sugar intake   3. Weight loss    Drug/Alcohol Use:   No      OTHER CORE COMPONENT ASSESSMENT:    Tobacco Use:     N/A: Pt has a remote history of smoking    Anginal Symptoms:  chest pressure and arm pain   NTG use: No prescription    SMART Goals:   consistent, controlled resting BP < 130/80, medication compliance, and abstain from smoking    Patient Specific CORE COMPONENT GOALS:    Improve BP  Check BP at home (needs new home cuff)  Remain asymptomatic      INDIVIDUALIZED TREATMENT PLAN      EXERCISE GOALS and PLAN      Progress toward Exercise goals:   Reviewed Pt goals and determined plan of care, Will continue to educate and progress as tolerated.    Exercise Plan:    education on home exercise guidelines, home exercise 30+ mins 2 days opposite CR, and Group class: Risk Factors for Heart Disease    The patient was counseled on exercise guidelines to achieve a minimum of 150 mins/wk of moderate intensity (RPE 4-6) "   exercise and encouraged to add 1-2 days of exercise on opposite days of cardiac rehab as tolerated.       PHYSICIAN PRESCRIBED EXERCISE:    Current Aerobic Exercise Prescription:      Frequency: 2-3 days/week   Supplement with home exercise 2+ days/wk as tolerated       Minutes: 25-35         METS: 4.5-5.5            HR: RHR +30-40bpm   RPE: 4-6         Modalities: Treadmill, Airdyne bike, UBE, Lifecycle, Elliptical, Rower, and Recumbent bike     Exercise workloads will be progressed gradually as tolerated, within limits of patient's ability, and according to the patient's   response to the exercise program.      Aerobic Exercise Prescription Plan for Progression   Frequency: 2-3 days/week of cardiac rehab       Supplement with home exercise 2+ days/wk as tolerated    Minutes: 35-45      >150 mins/wk of moderate intensity exercise   METS: 5.5-6.5   HR: RHR +30-40bpm     RPE: 4-6   Modalities: Treadmill, Airdyne bike, UBE, Lifecycle, Elliptical, Rower, and Recumbent bike    Strength trainin-3 days / week  12-15 repetitions  1-2 sets per modality    Modalities: Leg Press, Chest Press, and Pull Downs    Home Exercise: No formal HEP but active around his property and hunting/fishing    Exercise Education: benefit of exercise for CAD risk factors, home exercise guidelines, AHA guidelines to achieve >150 mins/wk of moderate exercise, RPE scale, and Group class: Risk Factors for Heart Disease     Readiness to change: Preparation:  (Getting ready to change)       NUTRITION GOALS AND PLAN      Nutritional   Reviewed patient's Rate your Plate. Discussed key elements of heart healthy eating. Reviewed patient goals for dietary modifications and their clinical implications.  Reviewed most recent lipid profile.     Patient's progress toward Nutrition goals:    Reviewed Pt goals and determined plan of care, Will continue to educate and progress as tolerated.      Nutrition Plan:   group class: Reading Food Labels and group  Class: Heart Healthy Eating    Measurable goals were based Rate Your Plate Dietary Self-Assessment. These are the areas in which the patient could score higher on the assessment.  Goals include recommendations for a heart healthy diet based on American Heart Association.    Nutrition Education:   heart healthy eating principles  weight loss and management strategies  nutrition for  lipid management  nutrition for Improved BG control  group class: Heart Healthy Eating  group class:  Label Reading    Readiness to change: Preparation:  (Getting ready to change)       PSYCHOSOCIAL GOALS AND PLAN    Psychosocial Assessment as it relates to rehabilitation:   Patient denies issues with his/her family or home life that may affect their rehabilitation efforts.     Patient's progress toward Psychosocial goals:    Reviewed Pt goals and determined plan of care, Will continue to educate and progress as tolerated.    Psychosocial Intervention/plan:   Class: Stress and Your Health, Practice relaxation techniques, Exercise, Spend time outdoors, and Enjoy a hobby such as hunting/fishing    Psychosocial Education: signs/sxs of depression, stress management techniques, benefits of enrolling in [facility], depression and CAD, and class:  Stress and Your Health     Information to utilize [employer] was provided as well as contact information for counseling through  Behavioral Health and group psychotherapy groups available.    Readiness to change: Preparation:  (Getting ready to change)       OTHER CORE COMPONENTS GOALS and PLAN      Blood Pressure will be monitored throughout the program and cardiologist will be notified of elevated trends.    Pt will be encouraged to monitor home BP if advised by cardiologist.    Tobacco Plan:   N/A:  Pt has a remote history of smoking.      Progress toward Core Component goals:   Reviewed Pt goals and determined plan of care, Will continue to educate and progress as tolerated.    Other Core  Components Intervention:   group class: Understanding Heart Disease, group class: Common Heart Medications, medication compliance, complete abstention from smoking, avoid places with second hand smoke, engage in regular exercise, and monitor home BP    Group and Individual Education:  understanding high blood pressure and it's relationship to CAD, components of blood pressure management, group class: Understanding Heart Disease, and group class:  Common Heart Medications    Readiness to change: Preparation:  (Getting ready to change)

## 2025-01-06 ENCOUNTER — CLINICAL SUPPORT (OUTPATIENT)
Dept: CARDIAC REHAB | Facility: HOSPITAL | Age: 50
End: 2025-01-06
Payer: COMMERCIAL

## 2025-01-06 DIAGNOSIS — I21.3 ST ELEVATION MYOCARDIAL INFARCTION (STEMI), UNSPECIFIED ARTERY (HCC): ICD-10-CM

## 2025-01-06 PROCEDURE — 93798 PHYS/QHP OP CAR RHAB W/ECG: CPT

## 2025-01-08 ENCOUNTER — CLINICAL SUPPORT (OUTPATIENT)
Dept: CARDIAC REHAB | Facility: HOSPITAL | Age: 50
End: 2025-01-08
Payer: COMMERCIAL

## 2025-01-08 DIAGNOSIS — I21.3 ST ELEVATION MYOCARDIAL INFARCTION (STEMI), UNSPECIFIED ARTERY (HCC): ICD-10-CM

## 2025-01-08 PROCEDURE — 93798 PHYS/QHP OP CAR RHAB W/ECG: CPT

## 2025-01-09 ENCOUNTER — HOSPITAL ENCOUNTER (OUTPATIENT)
Dept: NON INVASIVE DIAGNOSTICS | Facility: CLINIC | Age: 50
Discharge: HOME | End: 2025-01-09
Payer: COMMERCIAL

## 2025-01-09 VITALS
BODY MASS INDEX: 30.49 KG/M2 | HEIGHT: 70 IN | DIASTOLIC BLOOD PRESSURE: 82 MMHG | HEART RATE: 73 BPM | WEIGHT: 213 LBS | SYSTOLIC BLOOD PRESSURE: 126 MMHG

## 2025-01-09 DIAGNOSIS — I21.3 STEMI (ST ELEVATION MYOCARDIAL INFARCTION) (HCC): ICD-10-CM

## 2025-01-09 DIAGNOSIS — I25.10 CORONARY ARTERY DISEASE INVOLVING NATIVE CORONARY ARTERY OF NATIVE HEART WITHOUT ANGINA PECTORIS: ICD-10-CM

## 2025-01-09 DIAGNOSIS — R06.09 EXERTIONAL DYSPNEA: ICD-10-CM

## 2025-01-09 LAB
AORTIC ROOT: 3.4 CM
AORTIC VALVE MEAN VELOCITY: 10.1 M/S
APICAL FOUR CHAMBER EJECTION FRACTION: 57 %
AV AREA BY CONTINUOUS VTI: 2.9 CM2
AV AREA PEAK VELOCITY: 2.8 CM2
AV LVOT MEAN GRADIENT: 2 MMHG
AV LVOT PEAK GRADIENT: 4 MMHG
AV MEAN GRADIENT: 5 MMHG
AV PEAK GRADIENT: 8 MMHG
AV VALVE AREA: 2.9 CM2
AV VELOCITY RATIO: 0.74
BSA FOR ECHO PROCEDURE: 2.14 M2
DOP CALC AO PEAK VEL: 1.41 M/S
DOP CALC AO VTI: 28.07 CM
DOP CALC LVOT AREA: 3.8 CM2
DOP CALC LVOT CARDIAC INDEX: 2.59 L/MIN/M2
DOP CALC LVOT CARDIAC OUTPUT: 5.55 L/MIN
DOP CALC LVOT DIAMETER: 2.2 CM
DOP CALC LVOT PEAK VEL VTI: 21.45 CM
DOP CALC LVOT PEAK VEL: 1.04 M/S
DOP CALC LVOT STROKE INDEX: 37.9 ML/M2
DOP CALC LVOT STROKE VOLUME: 81.5
FRACTIONAL SHORTENING: 33 (ref 28–44)
INTERVENTRICULAR SEPTUM IN DIASTOLE (PARASTERNAL SHORT AXIS VIEW): 1.2 CM
INTERVENTRICULAR SEPTUM: 1.2 CM (ref 0.6–1.1)
LAAS-AP2: 18.3 CM2
LAAS-AP4: 17.3 CM2
LEFT ATRIUM SIZE: 3.5 CM
LEFT ATRIUM VOLUME (MOD BIPLANE): 47 ML
LEFT ATRIUM VOLUME INDEX (MOD BIPLANE): 22 ML/M2
LEFT INTERNAL DIMENSION IN SYSTOLE: 3.2 CM (ref 2.1–4)
LEFT VENTRICULAR INTERNAL DIMENSION IN DIASTOLE: 4.8 CM (ref 3.5–6)
LEFT VENTRICULAR POSTERIOR WALL IN END DIASTOLE: 1.2 CM
LEFT VENTRICULAR STROKE VOLUME: 65 ML
LVSV (TEICH): 65 ML
RIGHT ATRIUM AREA SYSTOLE A4C: 13.6 CM2
RIGHT VENTRICLE ID DIMENSION: 2.5 CM
SL CV LEFT ATRIUM LENGTH A2C: 5.4 CM
SL CV LV EF: 55
SL CV PED ECHO LEFT VENTRICLE DIASTOLIC VOLUME (MOD BIPLANE) 2D: 105 ML
SL CV PED ECHO LEFT VENTRICLE SYSTOLIC VOLUME (MOD BIPLANE) 2D: 40 ML

## 2025-01-09 PROCEDURE — 93308 TTE F-UP OR LMTD: CPT

## 2025-01-09 PROCEDURE — 93321 DOPPLER ECHO F-UP/LMTD STD: CPT | Performed by: INTERNAL MEDICINE

## 2025-01-09 PROCEDURE — 93325 DOPPLER ECHO COLOR FLOW MAPG: CPT | Performed by: INTERNAL MEDICINE

## 2025-01-09 PROCEDURE — 93325 DOPPLER ECHO COLOR FLOW MAPG: CPT

## 2025-01-09 PROCEDURE — 93308 TTE F-UP OR LMTD: CPT | Performed by: INTERNAL MEDICINE

## 2025-01-09 PROCEDURE — 93321 DOPPLER ECHO F-UP/LMTD STD: CPT

## 2025-01-09 RX ORDER — ASPIRIN 81 MG/1
81 TABLET ORAL DAILY
Qty: 30 TABLET | Refills: 5 | Status: SHIPPED | OUTPATIENT
Start: 2025-01-09

## 2025-01-09 RX ORDER — PANTOPRAZOLE SODIUM 40 MG/1
40 TABLET, DELAYED RELEASE ORAL
Qty: 30 TABLET | Refills: 5 | Status: SHIPPED | OUTPATIENT
Start: 2025-01-09

## 2025-01-09 RX ORDER — ATORVASTATIN CALCIUM 80 MG/1
80 TABLET, FILM COATED ORAL
Qty: 30 TABLET | Refills: 5 | Status: SHIPPED | OUTPATIENT
Start: 2025-01-09

## 2025-01-09 NOTE — TELEPHONE ENCOUNTER
Patient in office requesting refils.     Included is Pantoprazole- not typically filled by cardio but sent to see if approved or not by Doc as some times is cardiology related

## 2025-01-10 ENCOUNTER — CLINICAL SUPPORT (OUTPATIENT)
Dept: CARDIAC REHAB | Facility: HOSPITAL | Age: 50
End: 2025-01-10
Payer: COMMERCIAL

## 2025-01-10 DIAGNOSIS — I21.3 ST ELEVATION MYOCARDIAL INFARCTION (STEMI), UNSPECIFIED ARTERY (HCC): ICD-10-CM

## 2025-01-10 PROCEDURE — 93798 PHYS/QHP OP CAR RHAB W/ECG: CPT

## 2025-01-13 ENCOUNTER — CLINICAL SUPPORT (OUTPATIENT)
Dept: CARDIAC REHAB | Facility: HOSPITAL | Age: 50
End: 2025-01-13
Payer: COMMERCIAL

## 2025-01-13 DIAGNOSIS — I21.3 ST ELEVATION MYOCARDIAL INFARCTION (STEMI), UNSPECIFIED ARTERY (HCC): ICD-10-CM

## 2025-01-13 PROCEDURE — 93798 PHYS/QHP OP CAR RHAB W/ECG: CPT

## 2025-01-15 ENCOUNTER — CLINICAL SUPPORT (OUTPATIENT)
Dept: CARDIAC REHAB | Facility: HOSPITAL | Age: 50
End: 2025-01-15
Payer: COMMERCIAL

## 2025-01-15 DIAGNOSIS — I21.3 ST ELEVATION MYOCARDIAL INFARCTION (STEMI), UNSPECIFIED ARTERY (HCC): ICD-10-CM

## 2025-01-15 PROCEDURE — 93798 PHYS/QHP OP CAR RHAB W/ECG: CPT

## 2025-01-16 ENCOUNTER — NURSE TRIAGE (OUTPATIENT)
Age: 50
End: 2025-01-16

## 2025-01-16 NOTE — TELEPHONE ENCOUNTER
"Pt is post STEMI 12/14/24  Reason for Conversation: Since Monday pt has been experiencing continuous left sided chest pressure radiating into left axilla area and asking if this is normal?  Described as annoying #1/10, but feels similar to chronic discomfort stemming from 2023 that led up to STEMI  Denies any dizziness  Pt is attending attending cardiac rehab M-W-F  This morning 137/92 before meds  Later after meds 134/92  Last night 145/95  Pt does not feel he needs to report to ED due to the mild symptoms. Stated all they will do is an EKG    VS/Weight: (Note: Please include date/time vitals/weight were measured)  This morning 137/92 before meds  Later after meds 134/92  Last night 145/95    Pain: No    Risk Factors: STEMI    Recent relevant testing and date of testing: Echo 1/9/25    Medication: ASA, Lipitor 80 mg, Brilinta 90 mg, losartan 50 mg, Toprol 50 mg    Upcoming Office Visit: No    Last Office Visit: 12/24/24       Answer Assessment - Initial Assessment Questions  1. LOCATION: \"Where does it hurt?\"        Pressure, no pain left side  2. RADIATION: \"Does the pain go anywhere else?\" (e.g., into neck, jaw, arms, back)      Left axilla  3. ONSET: \"When did the chest pain begin?\" (Minutes, hours or days)       Monday  4. PATTERN: \"Does the pain come and go, or has it been constant since it started?\"  \"Does it get worse with exertion?\"       continuous  5. DURATION: \"How long does it last\" (e.g., seconds, minutes, hours)      ongoing  6. SEVERITY: \"How bad is the pain?\"  (e.g., Scale 1-10; mild, moderate, or severe)      1/10  7. CARDIAC RISK FACTORS: \"Do you have any history of heart problems or risk factors for heart disease?\" (e.g., angina, prior heart attack; diabetes, high blood pressure, high cholesterol, smoker, or strong family history of heart disease)      Post STEMI  8. PULMONARY RISK FACTORS: \"Do you have any history of lung disease?\"  (e.g., blood clots in lung, asthma, emphysema, birth control " "pills)      COPD  9. CAUSE: \"What do you think is causing the chest pain?\"      unknown  10. OTHER SYMPTOMS: \"Do you have any other symptoms?\" (e.g., dizziness, nausea, vomiting, sweating, fever, difficulty breathing, cough)        denies    Protocols used: Chest Pain-Adult-[place]    "

## 2025-01-16 NOTE — TELEPHONE ENCOUNTER
Spoke to patient.   He states that the discomfort (not pain) is left-sided chest and radiates into left axilla and bicep.   It is continuous and not reproducible.  He states that it is just always there.  No other symptoms noted.  However, this is similar to the discomfort that led up to his MI overtime.  He did report this at cardiac rehab as well.   His EF on last echo was improved at 55%.  He does not want to go to the ER and I did offer him an appointment for tomorrow, but wanted to get any other recommendations if necessary.

## 2025-01-16 NOTE — TELEPHONE ENCOUNTER
Called patient and gave him Dr Shannon's recommendations.  I did offer him an appointment for tomorrow with Dr Shannon, however, he would rather keep appointment with Rebecca next Friday and will go to ER if symptoms worsen.

## 2025-01-17 ENCOUNTER — CLINICAL SUPPORT (OUTPATIENT)
Dept: CARDIAC REHAB | Facility: HOSPITAL | Age: 50
End: 2025-01-17
Payer: COMMERCIAL

## 2025-01-17 DIAGNOSIS — I21.3 ST ELEVATION MYOCARDIAL INFARCTION (STEMI), UNSPECIFIED ARTERY (HCC): ICD-10-CM

## 2025-01-17 PROCEDURE — 93798 PHYS/QHP OP CAR RHAB W/ECG: CPT

## 2025-01-20 ENCOUNTER — APPOINTMENT (OUTPATIENT)
Dept: CARDIAC REHAB | Facility: HOSPITAL | Age: 50
End: 2025-01-20
Payer: COMMERCIAL

## 2025-01-22 ENCOUNTER — CLINICAL SUPPORT (OUTPATIENT)
Dept: CARDIAC REHAB | Facility: HOSPITAL | Age: 50
End: 2025-01-22
Payer: COMMERCIAL

## 2025-01-22 DIAGNOSIS — I21.3 ST ELEVATION MYOCARDIAL INFARCTION (STEMI), UNSPECIFIED ARTERY (HCC): Primary | ICD-10-CM

## 2025-01-22 PROCEDURE — 93798 PHYS/QHP OP CAR RHAB W/ECG: CPT

## 2025-01-23 NOTE — PROGRESS NOTES
CARDIAC REHABILITATION   ASSESSMENT AND INDIVIDUALIZED TREATMENT PLAN  30 DAY          Today's date: 2025   # of Exercise Sessions Completed:   Patient name: Benjamin W Kocher      : 1975  Age: 49 y.o.       MRN: [id number]  Referring Physician: Ranjeet Jc MD  Cardiologist: Jimy Shannon MD  Provider: Jovita  Clinician: Ruby Patton, MPT,  CCRP        Treatment is tailored to this patient's individual needs.  The ITP was reviewed with the patient and all questions were answered to their satisfaction.  Additional ITP documentation can be found electronically including daily and monthly exercise summaries, daily session notes with ECG summaries, education notes, daily medication reconciliation, and daily physician supervision.      Comments: Alexx has completed 8 exercise sessions at Cardiac Rehab post STEMI w/ intervention. He is currently able to work at a 5.14 MET Level. He has correct hemodynamic responses to the exercise. He rates his program a 4 on a 1-10 RPE Scale. Telemetry reveals NSR w/T Wave inversion.     He had noted minimal, fleeting anginal? symptoms on one occasion. He does not have a Nitro prescription.     He monitors his vitals daily. His resting BP continues to improve.     He is currently out of work and is looking forward to returning when he is cleared. He has a loretta stress job, a  in [place].     His program will be progressed as he is able to tolerate. He has been receiving education specific to his disease process. He plans to continue until program completion.         Dx: STEMI w/ intervention  Description of Diagnosis: ST elevation myocardial infarction involving left anterior descending (LAD) coronary artery with angioplasty x 3 and drug eluting stent x 3 to mid LAD and drug eluting stent x 1 to mid RCA  Date of onset: 2024  Other Cardiac History: Hx CVA, hx DVT, HLD, family hx        ASSESSMENT    Medical History:   Past Medical History:    Diagnosis Date    COPD (chronic obstructive pulmonary disease) (HCC)     Coronary artery disease     DVT of lower limb, acute (HCC)     Hyperlipidemia     Ischemic cardiomyopathy     MI (myocardial infarction)     STEMI (ST elevation myocardial infarction)     anterior       Family History:  Family History   Problem Relation Age of Onset    Hypertension Mother     Heart attack Father 42            Heart failure Paternal Aunt         pacemaker    Heart disease Paternal Uncle     Heart failure Paternal Grandmother         pacemaker    Heart attack Paternal Grandfather         cabg    Heart disease Paternal Grandfather 62        heart transplant       Allergies:   Patient has no known allergies.    Current Medications:   Current Outpatient Medications   Medication Sig Dispense Refill    albuterol (PROVENTIL HFA,VENTOLIN HFA) 90 mcg/act inhaler Inhale 2 puffs every 4 (four) hours as needed for wheezing 6.7 g 0    aspirin (ECOTRIN LOW STRENGTH) 81 mg EC tablet Take 1 tablet (81 mg total) by mouth daily 30 tablet 5    atorvastatin (LIPITOR) 80 mg tablet Take 1 tablet (80 mg total) by mouth daily with dinner 30 tablet 5    lidocaine (LIDODERM) 5 % Apply 1 patch topically over 12 hours daily Remove & Discard patch within 12 hours or as directed by MD (Patient taking differently: Apply 1 patch topically if needed) 30 patch 0    losartan (COZAAR) 50 mg tablet Take 1 tablet (50 mg total) by mouth daily 90 tablet 5    metoprolol succinate (TOPROL-XL) 50 mg 24 hr tablet Take 1 tablet (50 mg total) by mouth daily 90 tablet 5    pantoprazole (PROTONIX) 40 mg tablet Take 1 tablet (40 mg total) by mouth daily in the early morning 30 tablet 5    ticagrelor (BRILINTA) 90 MG Take 1 tablet (90 mg total) by mouth every 12 (twelve) hours 60 tablet 5     No current facility-administered medications for this visit.       Medication compliance: Yes   Comments: Pt reports to be compliant with medications. Patient does not have a  Nitro RX at this time.     Physical Limitations: None    Fall Risk: Low   Comments: Ambulates with a steady gait with no assist device. He denies any falls, LOB or syncope.     Cultural needs: none      CAD Risk Factors:  Cholesterol: Yes ()  HTN: No  DM: Pre-diabetes  Obesity: Yes (BMI 30.56 kg/m2)  Inactivity: No (No formal HEP but very active w/ work and hobbies) Recently patient has began a walking program.      EXERCISE ASSESSMENT:    Initial Fitness Assessment: Submax Treadmill ETT  Rest: HR 70, /72, SpO2 98%, Asx  Exercise: HR 99, /84, SpO2 99%, Asx, 5.8 METs   Reason for Discontinuation: 30 above SBP  Recovery: HR 70, /68, SpO2 99%, Asx      ECG INTERPRETATION:  NSR    Current Functional Status:  Occupation: full time job, canine officer in [place]  Recreation/Physical Activity: [place]  ADL’s:No limitations  [place]: No limitations  Home exercise: No formal HEP but very active  Other Comments: None      SMART Exercise Goals:   10% improvement in functional capacity based on max METs achieved in initial fitness assessment  increased exercise capacity by 40% based on peak METs tolerated in cardiac rehab exercise session  maintain > 150 minutes per week of moderate intensity exercise    Patient Specific EXERCISE GOALS:       Return to work unrestricted  Begin a formal HEP  Have the ability to be able to complete hobbies w/o issue    Functional Capacity Screening Tool:  [facility] Activity Status Index:  6.61 METs      PSYCHOSOCIAL ASSESSMENT:    Date of last Assessment:  1/3/2024  Depression screening:  PHQ-9 = 2    Interpretation:  1-4 = Minimal Depression  Anxiety screening:  JOO-7 = 1    Interpretation: 0-4  = Not anxious    Pt self-report of depression and anxiety   Patient reports they are coping well with good social support and denies depression or anxiety    Self-reported stress level:  3/10   Stressors:  Health, work  Stress Management Tools: practice Relaxation  "Techniques, spend time outside, and enjoy a hobby    SMART Psychosocial Goals:     Reduce perceived stress to 1-3/10, improved [facility] QOL < 27, PHQ-9 - reduced severity by one level, improved sleeping habits, feel less tired with more energy, and Improved appetite control    Patient Specific PSYCHOCOSOCIAL GOALS:    Use hobbies as stress relief  Spend time with family and friends    Quality of Life Screen:  (Higher score indicates disease impact on QOL)  [facility] COOP score: 28/45     Social Support:   siblings and friends  Community/Social Activities: Spend time with friends and family     Psychosocial Assessment as it relates to rehabilitation:   Patient denies issues with his/her family or home life that may affect their rehabilitation efforts.       NUTRITION ASSESSMENT:    Initial Weight:  213  Current Weight: 213    Height:   Ht Readings from Last 1 Encounters:   01/09/25 5' 10\" (1.778 m)       Rate Your Plate Score: 47/81    Diabetes: Pre-diabetes  A1c: 6.1    last measured: 12/13/2024    Lipid management: Discussed diet and lipid management and Last lipid profile 12/13/2024  Chol 185    HDL 43      Current Dietary Habits:  Patient is currently trying to lose weight. He is cutting back on his [place] iced tea intake. He is trying to make healthier choices. He has reduced daily sodium intake. He is aware to hydrate more often during the day.     SMART Nutrition Goals:   reduced BMI to < 25, LDL <100, fasting BG , Improved Rate Your Plate score  >58, and 2.5-5%  wt loss    Patient Specific NUTRITION GOALS:     1. Drink less iced tea   2. Watch sugar intake   3. Weight loss    Drug/Alcohol Use:   No      OTHER CORE COMPONENT ASSESSMENT:    Tobacco Use:     N/A: Pt has a remote history of smoking. He quit in 2018 and continues to abstain.     Anginal Symptoms:  chest pressure and arm pain at time of the event.  Patient had one occasion of minimal, fleeting anginal (? )symptoms. He does " not have Nitro RX.    NTG use: No prescription    SMART Goals:   consistent, controlled resting BP < 130/80, medication compliance, and abstain from smoking    Patient Specific CORE COMPONENT GOALS:    Improve BP  Check BP at home (needs new home cuff)  Remain asymptomatic      INDIVIDUALIZED TREATMENT PLAN      EXERCISE GOALS and PLAN      Progress toward Exercise goals:   Will continue to educate and progress as tolerated., Goals met: patient is compliant attending his CR sessions and is able to work at a 5.14 MET Level. He is very active at home, very little sitting time. He plans to return to work full time/duty when medically stable.    Exercise Plan:    education on home exercise guidelines, home exercise 30+ mins 2 days opposite CR, and Group class: Risk Factors for Heart Disease    The patient was counseled on exercise guidelines to achieve a minimum of 150 mins/wk of moderate intensity (RPE 4-6) exercise and encouraged to add 1-2 days of exercise on opposite days of cardiac rehab as tolerated.       PHYSICIAN PRESCRIBED EXERCISE:    Current Aerobic Exercise Prescription:      Frequency: 2-3 days/week   Supplement with home exercise 2+ days/wk as tolerated       Minutes: 40-45         METS: 5.14           HR: RHR +30-40bpm   RPE: 4-6/10         Modalities: Treadmill, UBE, Lifecycle, Rower, and Recumbent bike     Exercise workloads will be progressed gradually as tolerated, within limits of patient's ability, and according to the patient's response to the exercise program.      Aerobic Exercise Prescription Plan for Progression   Frequency: 2-3 days/week of cardiac rehab       Supplement with home exercise 2+ days/wk as tolerated    Minutes: 45-50      >150 mins/wk of moderate intensity exercise   METS: 5.14-6.50   HR: RHR +30-40bpm     RPE: 4-6/10   Modalities: Treadmill, Airdyne bike, UBE, Lifecycle, Elliptical, Rower, and Recumbent bike    Strength trainin-3 days / week  12-15 repetitions  1-2 sets  "per modality    Modalities: Leg Press, Chest Press, and Pull Downs    Home Exercise: No formal HEP but active around his property and hunting/fishing. He is now walking long distances daily.     Exercise Education: benefit of exercise for CAD risk factors, home exercise guidelines, AHA guidelines to achieve >150 mins/wk of moderate exercise, RPE scale, and Group class: Risk Factors for Heart Disease     Readiness to change: Action:  (Changing behavior)      NUTRITION GOALS AND PLAN      Nutritional   Reviewed patient's Rate your Plate. Discussed key elements of heart healthy eating. Reviewed patient goals for dietary modifications and their clinical implications.  Reviewed most recent lipid profile.     Patient's progress toward Nutrition goals:    Will continue to educate and progress as tolerated., Goals not met: no weight loss during this reporting period.  , he has reduced daily sodium, saturated fat and sugar intake. He is more aware to hydrate throughout the day.  Patient states he does eat lean meats and fish.       Nutrition Plan:   group class: Reading Food Labels and group Class: Heart Healthy Eating    Measurable goals were based Rate Your Plate Dietary Self-Assessment. These are the areas in which the patient could score higher on the assessment.  Goals include recommendations for a heart healthy diet based on American Heart Association.    Nutrition Education:   heart healthy eating principles  weight loss and management strategies  nutrition for  lipid management  nutrition for Improved BG control  group class: Heart Healthy Eating  group class:  Label Reading  \"Healthy Holiday Eating\"    Readiness to change: Action:  (Changing behavior)      PSYCHOSOCIAL GOALS AND PLAN    Psychosocial Assessment as it relates to rehabilitation:   Patient denies issues with his family or home life that may affect their rehabilitation efforts. Patient has family support. He lost his wife to cancer < 1 year ago. He is " the primary caregiver for an uncle w/cancer and depression. He is providing transportation for all of his uncle's medical appointments.     Patient's progress toward Psychosocial goals:   Stress level remains low at 2-3/10. He continues to deny anxiety/depression. He notes that his needs are being met. CR staff will continue to offer support as needed.     Psychosocial Intervention/plan:   Class: Stress and Your Health, Practice relaxation techniques, Exercise, Spend time outdoors, and Enjoy a hobby such as hunting/fishing .    Psychosocial Education: signs/sxs of depression, stress management techniques, benefits of enrolling in [facility], depression and CAD, and class:  Stress and Your Health     Information to utilize [employer] was provided as well as contact information for counseling through  Behavioral Health and group psychotherapy groups available.    Readiness to change: Action:  (Changing behavior)      OTHER CORE COMPONENTS GOALS and PLAN      Blood Pressure will be monitored throughout the program and cardiologist will be notified of elevated trends.  Pt will be encouraged to monitor home BP if advised by cardiologist.    Tobacco Plan:   N/A:  Pt has a remote history of smoking.  He quit 2018 and continues to abstain. He avoids second hand smoke and other irritants.     Progress toward Core Component goals:   Pt is progressing and showing improvement  toward the following goals:  consistent resting BP < 130/80.  , Will continue to educate and progress as tolerated., Goals met: medication compliance, daily monitoring of vitals at home and moderate exercise at 150 min/week minimum. .    Other Core Components Intervention:   group class: Understanding Heart Disease, group class: Common Heart Medications, medication compliance, complete abstention from smoking, avoid places with second hand smoke, engage in regular exercise, and monitor home BP    Group and Individual Education:  understanding high  blood pressure and it's relationship to CAD, components of blood pressure management, group class: Understanding Heart Disease, and group class:  Common Heart Medications    Readiness to change: Preparation:  (Getting ready to change)

## 2025-01-24 ENCOUNTER — PATIENT MESSAGE (OUTPATIENT)
Dept: CARDIOLOGY CLINIC | Facility: CLINIC | Age: 50
End: 2025-01-24

## 2025-01-24 ENCOUNTER — CLINICAL SUPPORT (OUTPATIENT)
Dept: CARDIAC REHAB | Facility: HOSPITAL | Age: 50
End: 2025-01-24
Payer: COMMERCIAL

## 2025-01-24 ENCOUNTER — OFFICE VISIT (OUTPATIENT)
Dept: CARDIOLOGY CLINIC | Facility: CLINIC | Age: 50
End: 2025-01-24
Payer: COMMERCIAL

## 2025-01-24 VITALS
WEIGHT: 213 LBS | SYSTOLIC BLOOD PRESSURE: 116 MMHG | OXYGEN SATURATION: 97 % | RESPIRATION RATE: 16 BRPM | BODY MASS INDEX: 30.49 KG/M2 | DIASTOLIC BLOOD PRESSURE: 74 MMHG | HEIGHT: 70 IN | HEART RATE: 74 BPM

## 2025-01-24 DIAGNOSIS — I21.02 ST ELEVATION MYOCARDIAL INFARCTION INVOLVING LEFT ANTERIOR DESCENDING (LAD) CORONARY ARTERY (HCC): Primary | ICD-10-CM

## 2025-01-24 DIAGNOSIS — I25.10 CORONARY ARTERY DISEASE INVOLVING NATIVE CORONARY ARTERY OF NATIVE HEART WITHOUT ANGINA PECTORIS: ICD-10-CM

## 2025-01-24 DIAGNOSIS — E78.2 MIXED HYPERLIPIDEMIA: ICD-10-CM

## 2025-01-24 DIAGNOSIS — I50.22 HEART FAILURE WITH MID-RANGE EJECTION FRACTION (HCC): ICD-10-CM

## 2025-01-24 DIAGNOSIS — I21.3 ST ELEVATION MYOCARDIAL INFARCTION (STEMI), UNSPECIFIED ARTERY (HCC): ICD-10-CM

## 2025-01-24 DIAGNOSIS — I25.5 ISCHEMIC CARDIOMYOPATHY: ICD-10-CM

## 2025-01-24 DIAGNOSIS — I21.3 STEMI (ST ELEVATION MYOCARDIAL INFARCTION) (HCC): ICD-10-CM

## 2025-01-24 PROCEDURE — 93798 PHYS/QHP OP CAR RHAB W/ECG: CPT

## 2025-01-24 PROCEDURE — 99214 OFFICE O/P EST MOD 30 MIN: CPT | Performed by: NURSE PRACTITIONER

## 2025-01-24 RX ORDER — LOSARTAN POTASSIUM 25 MG/1
25 TABLET ORAL DAILY
Start: 2025-01-24

## 2025-01-24 RX ORDER — METOPROLOL SUCCINATE 50 MG/1
25 TABLET, EXTENDED RELEASE ORAL DAILY
Start: 2025-01-24

## 2025-01-24 NOTE — LETTER
January 24, 2025         Patient: Benjamin W Kocher  YOB: 1975  Date of Visit: 1/24/2025    To Whom it May Concern:    Benjamin Kocher is under my professional care. Alexx was seen in my office on 1/24/2025.   He may return to work on 1/27/25.    If you have any questions or concerns, please don't hesitate to call.         Sincerely,              JOSUE Canchola

## 2025-01-24 NOTE — PROGRESS NOTES
Patient ID: Benjamin W Kocher is a 49 y.o. male.        Plan:      Assessment & Plan  Mixed hyperlipidemia  Continue Lipitor 80 mg daily  Ischemic cardiomyopathy  EF now improved to 55%  Continue losartan and metoprolol for now  See additional comments below  ST elevation myocardial infarction involving left anterior descending (LAD) coronary artery (HCC)  PCI to mid LAD, 12/14/2024  Coronary artery disease involving native coronary artery of native heart without angina pectoris  Anterior STEMI 12/14/2024 s/p PCI of mid LAD and mid RCA 12/14/2024  with reported prior MI 2007 with normal cath  Continue DAPT (aspirin and Brilinta) through 12/14/2025  Metoprolol 50 mg daily, atorvastatin 80 mg daily  STEMI (ST elevation myocardial infarction) (HCC)  PCI to mid LAD, 12/14/2024      Follow up Plan/Summary Comments:  Alexx is doing well from a cardiac perspective.  EF has normalized.  Recommend continuing metoprolol for at least 1 year post STEMI.  Will reduce dose to 25 mg daily, reduce losartan to 25 mg daily given low blood pressure readings.    Continue cardiac rehab.  Given persistent episodes of chest discomfort, will check NM stress.  OK to return to work.  He knows to take it easy until stress is done.     Follow-up in 3 months.  Sooner if needed.    HPI: I the pleasure of seeing Alexx in the office today for a follow-up visit.    He was last seen for hospital follow-up visit 12/24/2024 after an anterior STEMI on 12/14/2024.    Since his last visit, a repeat limited echo was done to reassess wall motion and EF.  Results below.    Since his last visit, he has been doing well.  He has started cardiac rehab.    Alexx reports that he occasionally gets lightheaded with quick position changes but denies any syncope.    He describes a discomfort in the lateral portion of his chest into his axilla that comes on at rest and actually improves with activity.  If he sits and relaxes it lingers but once he gets up  "and moves it goes away.  He says this happens on occasion and comes and goes.  This discomfort is not experienced at cardiac rehab.  The pattern is not similar to symptoms experienced prior to his MI.  There is reproduction of a similar discomfort with mild palpation, though not as severe.      Review of Systems   10  point ROS  was otherwise non pertinent or negative except as per HPI or as below.   Gait: Normal      Most recent or relevant cardiac/vascular testing:    Echo (limited) 1/9/2025  EF 55%, normal wall motion    Echo 12/14/2024  EF 40 to 45%  Wall motion abnormalities noted  Mild TR    Cardiac cath 12/14/2024    Ost LAD to Prox LAD lesion is 40% stenosed.    2nd Diag lesion is 100% stenosed.    Mid LAD lesion is 99% stenosed.    1st Mrg lesion is 50% stenosed.    Mid RCA lesion is 90% stenosed.     3v CAD  PCI mLAD (culprit) + PCI mRCA  Mildly elevated LVEDP    Objective:     /74 (BP Location: Left arm, Patient Position: Sitting, Cuff Size: Large)   Pulse 74   Resp 16   Ht 5' 10\" (1.778 m)   Wt 96.6 kg (213 lb)   SpO2 97%   BMI 30.56 kg/m²     PHYSICAL EXAM:    General:  Normal appearance, no acute distress  Eyes:  Anicteric.  Oral mucosa:  Moist.  Neck:  No JVD. Carotid upstrokes are brisk without bruits.  No masses.  Chest:  Clear to auscultation   Cardiac:  No palpable PMI.  Normal S1 and S2.  No murmur gallop or rub.  Abdomen:  Soft and nontender. No palpable organomegaly or aortic enlargement.  Extremities:  No peripheral edema.  Musculoskeletal:  Symmetric.   Vascular:  Pedal pulses are intact.  Neuro:  Grossly symmetric.  Psych:  Alert and oriented x3.    No Known Allergies    Current Outpatient Medications:     albuterol (PROVENTIL HFA,VENTOLIN HFA) 90 mcg/act inhaler, Inhale 2 puffs every 4 (four) hours as needed for wheezing, Disp: 6.7 g, Rfl: 0    aspirin (ECOTRIN LOW STRENGTH) 81 mg EC tablet, Take 1 tablet (81 mg total) by mouth daily, Disp: 30 tablet, Rfl: 5    atorvastatin " (LIPITOR) 80 mg tablet, Take 1 tablet (80 mg total) by mouth daily with dinner, Disp: 30 tablet, Rfl: 5    lidocaine (LIDODERM) 5 %, Apply 1 patch topically over 12 hours daily Remove & Discard patch within 12 hours or as directed by MD (Patient taking differently: Apply 1 patch topically if needed (moderate pain)), Disp: 30 patch, Rfl: 0    losartan (COZAAR) 50 mg tablet, Take 1 tablet (50 mg total) by mouth daily, Disp: 90 tablet, Rfl: 5    metoprolol succinate (TOPROL-XL) 50 mg 24 hr tablet, Take 1 tablet (50 mg total) by mouth daily, Disp: 90 tablet, Rfl: 5    pantoprazole (PROTONIX) 40 mg tablet, Take 1 tablet (40 mg total) by mouth daily in the early morning, Disp: 30 tablet, Rfl: 5    ticagrelor (BRILINTA) 90 MG, Take 1 tablet (90 mg total) by mouth every 12 (twelve) hours, Disp: 60 tablet, Rfl: 5  Past Medical History:   Diagnosis Date    COPD (chronic obstructive pulmonary disease) (HCC)     Coronary artery disease     DVT of lower limb, acute (HCC)     Hyperlipidemia     Ischemic cardiomyopathy     MI (myocardial infarction) 2007    STEMI (ST elevation myocardial infarction)     anterior     Past Surgical History:   Procedure Laterality Date    CARDIAC CATHETERIZATION N/A 12/13/2024    Procedure: Cardiac PCI;  Surgeon: Ranjeet Jc MD;  Location: [facility];  Service: Cardiology    CARDIAC CATHETERIZATION Left 12/13/2024    Procedure: Cardiac Left Heart Cath;  Surgeon: Ranjeet Jc MD;  Location: [facility];  Service: Cardiology    KNEE SURGERY  2010       CMP:   Lab Results   Component Value Date    K 3.9 12/16/2024    K 4.7 11/09/2023     12/16/2024     11/09/2023    CO2 24 12/16/2024    CO2 27 11/09/2023    BUN 14 12/16/2024    BUN 19 11/09/2023    CREATININE 1.16 12/16/2024    CREATININE 1.06 11/09/2023    EGFR 73 12/16/2024    EGFR 86 11/09/2023    EGFR 74 11/10/2020     Lipid Profile:    Lab Results   Component Value Date    TRIG 135 12/13/2024    HDL 43 12/13/2024  "        Social History     Tobacco Use   Smoking Status Former    Current packs/day: 0.00    Average packs/day: 0.5 packs/day for 24.0 years (12.0 ttl pk-yrs)    Types: Cigarettes    Start date:     Quit date:     Years since quittin.0   Smokeless Tobacco Current   Tobacco Comments    \"here and there a cigerette\"               "

## 2025-01-24 NOTE — ASSESSMENT & PLAN NOTE
Wt Readings from Last 3 Encounters:   01/24/25 96.6 kg (213 lb)   01/09/25 96.6 kg (213 lb)   12/24/24 96.6 kg (213 lb)

## 2025-01-24 NOTE — ASSESSMENT & PLAN NOTE
Anterior STEMI 12/14/2024 s/p PCI of mid LAD and mid RCA 12/14/2024  with reported prior MI 2007 with normal cath  Continue DAPT (aspirin and Brilinta) through 12/14/2025  Metoprolol 50 mg daily, atorvastatin 80 mg daily

## 2025-01-27 ENCOUNTER — CLINICAL SUPPORT (OUTPATIENT)
Dept: CARDIAC REHAB | Facility: HOSPITAL | Age: 50
End: 2025-01-27
Payer: COMMERCIAL

## 2025-01-27 ENCOUNTER — TELEPHONE (OUTPATIENT)
Age: 50
End: 2025-01-27

## 2025-01-27 DIAGNOSIS — I21.3 ST ELEVATION MYOCARDIAL INFARCTION (STEMI), UNSPECIFIED ARTERY (HCC): ICD-10-CM

## 2025-01-27 PROCEDURE — 93798 PHYS/QHP OP CAR RHAB W/ECG: CPT

## 2025-01-27 NOTE — TELEPHONE ENCOUNTER
Spoke with patient.  Provided him the number for central scheduling as he cannot make the appointment available at [place] for 1/30/25 due to work schedule.

## 2025-01-27 NOTE — TELEPHONE ENCOUNTER
Patient calling regarding his appointment for the stress test. He misunderstood the message in [employer], and though the appointment was for this week. He has work next week and thought the doctor wanted him to have the test this week. He also sent a [employer] message today regarding this.

## 2025-01-29 ENCOUNTER — CLINICAL SUPPORT (OUTPATIENT)
Dept: CARDIAC REHAB | Facility: HOSPITAL | Age: 50
End: 2025-01-29
Payer: COMMERCIAL

## 2025-01-29 DIAGNOSIS — I21.3 ST ELEVATION MYOCARDIAL INFARCTION (STEMI), UNSPECIFIED ARTERY (HCC): ICD-10-CM

## 2025-01-29 PROCEDURE — 93798 PHYS/QHP OP CAR RHAB W/ECG: CPT

## 2025-01-30 ENCOUNTER — HOSPITAL ENCOUNTER (OUTPATIENT)
Dept: NON INVASIVE DIAGNOSTICS | Facility: CLINIC | Age: 50
Discharge: HOME | End: 2025-01-30

## 2025-01-30 DIAGNOSIS — I25.10 CORONARY ARTERY DISEASE INVOLVING NATIVE CORONARY ARTERY OF NATIVE HEART WITHOUT ANGINA PECTORIS: ICD-10-CM

## 2025-01-31 ENCOUNTER — CLINICAL SUPPORT (OUTPATIENT)
Dept: CARDIAC REHAB | Facility: HOSPITAL | Age: 50
End: 2025-01-31
Payer: COMMERCIAL

## 2025-01-31 DIAGNOSIS — I21.3 ST ELEVATION MYOCARDIAL INFARCTION (STEMI), UNSPECIFIED ARTERY (HCC): ICD-10-CM

## 2025-01-31 PROCEDURE — 93798 PHYS/QHP OP CAR RHAB W/ECG: CPT

## 2025-02-03 ENCOUNTER — CLINICAL SUPPORT (OUTPATIENT)
Dept: CARDIAC REHAB | Facility: HOSPITAL | Age: 50
End: 2025-02-03
Payer: COMMERCIAL

## 2025-02-03 DIAGNOSIS — I21.3 ST ELEVATION MYOCARDIAL INFARCTION (STEMI), UNSPECIFIED ARTERY (HCC): ICD-10-CM

## 2025-02-03 PROCEDURE — 93798 PHYS/QHP OP CAR RHAB W/ECG: CPT

## 2025-02-05 ENCOUNTER — CLINICAL SUPPORT (OUTPATIENT)
Dept: CARDIAC REHAB | Facility: HOSPITAL | Age: 50
End: 2025-02-05
Payer: COMMERCIAL

## 2025-02-05 DIAGNOSIS — I21.3 ST ELEVATION MYOCARDIAL INFARCTION (STEMI), UNSPECIFIED ARTERY (HCC): ICD-10-CM

## 2025-02-05 PROCEDURE — 93798 PHYS/QHP OP CAR RHAB W/ECG: CPT

## 2025-02-06 ENCOUNTER — HOSPITAL ENCOUNTER (OUTPATIENT)
Dept: NUCLEAR MEDICINE | Facility: HOSPITAL | Age: 50
End: 2025-02-06
Payer: COMMERCIAL

## 2025-02-06 ENCOUNTER — HOSPITAL ENCOUNTER (OUTPATIENT)
Dept: NON INVASIVE DIAGNOSTICS | Facility: HOSPITAL | Age: 50
Discharge: HOME | End: 2025-02-06
Payer: COMMERCIAL

## 2025-02-06 VITALS
DIASTOLIC BLOOD PRESSURE: 70 MMHG | SYSTOLIC BLOOD PRESSURE: 106 MMHG | HEART RATE: 68 BPM | WEIGHT: 213 LBS | HEIGHT: 70 IN | RESPIRATION RATE: 20 BRPM | OXYGEN SATURATION: 97 % | BODY MASS INDEX: 30.49 KG/M2

## 2025-02-06 LAB
ARRHY DURING EX: NORMAL
CHEST PAIN STATEMENT: NORMAL
ECG INTERP BEFORE EX: NORMAL
MAX DIASTOLIC BP: 84 MMHG
MAX PREDICTED HEART RATE: 170 BPM
PROTOCOL NAME: NORMAL
RATE PRESSURE PRODUCT: NORMAL
REASON FOR TERMINATION: NORMAL
SL CV REST NUCLEAR ISOTOPE DOSE: 10.4 MCI
SL CV STRESS NUCLEAR ISOTOPE DOSE: 31.3 MCI
SL CV STRESS RECOVERY BP: NORMAL MMHG
SL CV STRESS RECOVERY HR: 78 BPM
SL CV STRESS RECOVERY O2 SAT: 98 %
SPECT HRT GATED+EF W RNC IV: 68 %
STRESS ANGINA INDEX: 0
STRESS BASELINE BP: NORMAL MMHG
STRESS BASELINE HR: 68 BPM
STRESS O2 SAT REST: 97 %
STRESS PEAK HR: 98 BPM
STRESS POST EXERCISE DUR MIN: 3 MIN
STRESS POST EXERCISE DUR SEC: 0 SEC
STRESS POST O2 SAT PEAK: 98 %
STRESS POST PEAK BP: 146 MMHG
STRESS POST PEAK HR: 100 BPM
STRESS POST PEAK SYSTOLIC BP: 146 MMHG
TARGET HR FORMULA: NORMAL
TEST INDICATION: NORMAL

## 2025-02-06 PROCEDURE — 93017 CV STRESS TEST TRACING ONLY: CPT

## 2025-02-06 PROCEDURE — 93016 CV STRESS TEST SUPVJ ONLY: CPT | Performed by: INTERNAL MEDICINE

## 2025-02-06 PROCEDURE — 93018 CV STRESS TEST I&R ONLY: CPT | Performed by: INTERNAL MEDICINE

## 2025-02-06 PROCEDURE — A9502 TC99M TETROFOSMIN: HCPCS

## 2025-02-06 PROCEDURE — 78452 HT MUSCLE IMAGE SPECT MULT: CPT

## 2025-02-06 PROCEDURE — 78452 HT MUSCLE IMAGE SPECT MULT: CPT | Performed by: INTERNAL MEDICINE

## 2025-02-06 RX ORDER — REGADENOSON 0.08 MG/ML
0.4 INJECTION, SOLUTION INTRAVENOUS ONCE
Status: COMPLETED | OUTPATIENT
Start: 2025-02-06 | End: 2025-02-06

## 2025-02-06 RX ADMIN — REGADENOSON 0.4 MG: 0.08 INJECTION, SOLUTION INTRAVENOUS at 09:29

## 2025-02-07 ENCOUNTER — CLINICAL SUPPORT (OUTPATIENT)
Dept: CARDIAC REHAB | Facility: HOSPITAL | Age: 50
End: 2025-02-07
Payer: COMMERCIAL

## 2025-02-07 DIAGNOSIS — I21.3 ST ELEVATION MYOCARDIAL INFARCTION (STEMI), UNSPECIFIED ARTERY (HCC): ICD-10-CM

## 2025-02-07 PROCEDURE — 93798 PHYS/QHP OP CAR RHAB W/ECG: CPT

## 2025-02-10 ENCOUNTER — CLINICAL SUPPORT (OUTPATIENT)
Dept: CARDIAC REHAB | Facility: HOSPITAL | Age: 50
End: 2025-02-10
Payer: COMMERCIAL

## 2025-02-10 DIAGNOSIS — I21.3 ST ELEVATION MYOCARDIAL INFARCTION (STEMI), UNSPECIFIED ARTERY (HCC): ICD-10-CM

## 2025-02-10 DIAGNOSIS — I25.10 CORONARY ARTERY DISEASE INVOLVING NATIVE CORONARY ARTERY OF NATIVE HEART WITHOUT ANGINA PECTORIS: Primary | ICD-10-CM

## 2025-02-10 PROCEDURE — 93798 PHYS/QHP OP CAR RHAB W/ECG: CPT

## 2025-02-10 RX ORDER — CLOPIDOGREL BISULFATE 75 MG/1
600 TABLET ORAL ONCE
Qty: 8 TABLET | Refills: 0 | Status: SHIPPED | OUTPATIENT
Start: 2025-02-11 | End: 2025-02-11

## 2025-02-10 RX ORDER — CLOPIDOGREL BISULFATE 75 MG/1
75 TABLET ORAL DAILY
Qty: 30 TABLET | Refills: 5 | Status: SHIPPED | OUTPATIENT
Start: 2025-02-12

## 2025-02-10 RX ORDER — CLOPIDOGREL 300 MG/1
600 TABLET, FILM COATED ORAL ONCE
Qty: 2 TABLET | Refills: 0 | Status: CANCELLED | OUTPATIENT
Start: 2025-02-10 | End: 2025-02-10

## 2025-02-10 NOTE — TELEPHONE ENCOUNTER
Received call from patient regarding his Brilinta. He states he has discussed in the past with Dr. Shannon about switching from Brilinta to Plavix. He wants to know when he can do this, as the Brilinta is still causing him shortness of breath and he wants to discontinue it.

## 2025-02-10 NOTE — TELEPHONE ENCOUNTER
Spoke to patient and gave him instructions for transition of Brilinta to Plavix.   Will send [employer] message to confirm.  He will take the Brilinta 90 mg this morning and this evening and then tomorrow, 2/11/25, he will take 8 tablets (600mg total) for loading dose 24 hours after last dose of Brilinta and then 75 mg one tablet daily thereafter.  Will keep an eye on symptoms.  New orders sent to [facility].

## 2025-02-12 ENCOUNTER — CLINICAL SUPPORT (OUTPATIENT)
Dept: CARDIAC REHAB | Facility: HOSPITAL | Age: 50
End: 2025-02-12
Payer: COMMERCIAL

## 2025-02-12 DIAGNOSIS — I21.3 ST ELEVATION MYOCARDIAL INFARCTION (STEMI), UNSPECIFIED ARTERY (HCC): ICD-10-CM

## 2025-02-12 PROCEDURE — 93798 PHYS/QHP OP CAR RHAB W/ECG: CPT

## 2025-02-14 ENCOUNTER — APPOINTMENT (OUTPATIENT)
Dept: CARDIAC REHAB | Facility: HOSPITAL | Age: 50
End: 2025-02-14
Payer: COMMERCIAL

## 2025-02-17 ENCOUNTER — CLINICAL SUPPORT (OUTPATIENT)
Dept: CARDIAC REHAB | Facility: HOSPITAL | Age: 50
End: 2025-02-17
Payer: COMMERCIAL

## 2025-02-17 DIAGNOSIS — I21.3 ST ELEVATION MYOCARDIAL INFARCTION (STEMI), UNSPECIFIED ARTERY (HCC): Primary | ICD-10-CM

## 2025-02-17 PROCEDURE — 93798 PHYS/QHP OP CAR RHAB W/ECG: CPT

## 2025-02-18 DIAGNOSIS — I21.3 STEMI (ST ELEVATION MYOCARDIAL INFARCTION) (HCC): ICD-10-CM

## 2025-02-18 DIAGNOSIS — I50.22 HEART FAILURE WITH MID-RANGE EJECTION FRACTION (HCC): ICD-10-CM

## 2025-02-19 ENCOUNTER — APPOINTMENT (OUTPATIENT)
Dept: CARDIAC REHAB | Facility: HOSPITAL | Age: 50
End: 2025-02-19
Payer: COMMERCIAL

## 2025-02-19 RX ORDER — LOSARTAN POTASSIUM 25 MG/1
25 TABLET ORAL DAILY
Qty: 90 TABLET | Refills: 0 | Status: SHIPPED | OUTPATIENT
Start: 2025-02-19

## 2025-02-19 RX ORDER — METOPROLOL SUCCINATE 50 MG/1
25 TABLET, EXTENDED RELEASE ORAL DAILY
Qty: 45 TABLET | Refills: 0 | Status: SHIPPED | OUTPATIENT
Start: 2025-02-19

## 2025-02-19 NOTE — PROGRESS NOTES
CARDIAC REHABILITATION   ASSESSMENT AND INDIVIDUALIZED TREATMENT PLAN  60 DAY          Today's date: 2025   # of Exercise Sessions Completed:   Patient name: Benjamin W Kocher      : 1975  Age: 49 y.o.       MRN: [id number]  Referring Physician: Ranjeet Jc MD  Cardiologist: Jimy Shannon MD  Provider: Jovita  Clinician: Ruby Patton, MPT,  CCRP        Treatment is tailored to this patient's individual needs.  The ITP was reviewed with the patient and all questions were answered to their satisfaction.  Additional ITP documentation can be found electronically including daily and monthly exercise summaries, daily session notes with ECG summaries, education notes, daily medication reconciliation, and daily physician supervision.      Comments: Alexx has completed 18 exercise sessions at Cardiac Rehab post STEMI w/ intervention. He is currently able to work at a 5.14 MET Level. He has correct hemodynamic responses to the exercise. He rates his program a 4 on a 1-10 RPE Scale. Telemetry reveals NSR w/T Wave inversion.     He monitors his vitals daily. His resting BP continues to improve.     His program will be progressed as he is able to tolerate. He has been receiving education specific to his disease process. He plans to continue until program completion.     SUMMARY 2025    Resting BP  118/62 - 134/70,  HR 74 - 81  Exercise /70- 164/76.  HR 91 - 100  Exercise session details:  45-50 minutes,  5.14 METs  Telemetry:  NSR  Symptoms: Patient has not had any symptoms during this reporting period.   Home exercise/ADLs: He has resumed all ADLs w/out issue  Patient's subjective report of progress: Patient has returned to work full time/duty as a  in [place].     Dx: STEMI w/ intervention  Description of Diagnosis: ST elevation myocardial infarction involving left anterior descending (LAD) coronary artery with angioplasty x 3 and drug eluting stent x 3 to mid LAD  and drug eluting stent x 1 to mid RCA  Date of onset: 2024  Other Cardiac History: Hx CVA, hx DVT, HLD, family hx        ASSESSMENT    Medical History:   Past Medical History:   Diagnosis Date    COPD (chronic obstructive pulmonary disease) (HCC)     Coronary artery disease     DVT of lower limb, acute (HCC)     Hyperlipidemia     Ischemic cardiomyopathy     MI (myocardial infarction)     STEMI (ST elevation myocardial infarction)     anterior       Family History:  Family History   Problem Relation Age of Onset    Hypertension Mother     Heart attack Father 42            Heart failure Paternal Aunt         pacemaker    Heart disease Paternal Uncle     Heart failure Paternal Grandmother         pacemaker    Heart attack Paternal Grandfather         cabg    Heart disease Paternal Grandfather 62        heart transplant       Allergies:   Patient has no known allergies.    Current Medications:   Current Outpatient Medications   Medication Sig Dispense Refill    albuterol (PROVENTIL HFA,VENTOLIN HFA) 90 mcg/act inhaler Inhale 2 puffs every 4 (four) hours as needed for wheezing 6.7 g 0    aspirin (ECOTRIN LOW STRENGTH) 81 mg EC tablet Take 1 tablet (81 mg total) by mouth daily 30 tablet 5    atorvastatin (LIPITOR) 80 mg tablet Take 1 tablet (80 mg total) by mouth daily with dinner 30 tablet 5    clopidogrel (PLAVIX) 75 mg tablet Take 1 tablet (75 mg total) by mouth daily Do not start before 2025. 30 tablet 5    clopidogrel (Plavix) 75 mg tablet Take 8 tablets (600 mg total) by mouth once for 1 dose Do not start before 2025. 8 tablet 0    lidocaine (LIDODERM) 5 % Apply 1 patch topically over 12 hours daily Remove & Discard patch within 12 hours or as directed by MD (Patient taking differently: Apply 1 patch topically if needed (moderate pain)) 30 patch 0    losartan (COZAAR) 25 mg tablet Take 1 tablet (25 mg total) by mouth daily      metoprolol succinate (TOPROL-XL) 50 mg 24 hr  tablet Take 0.5 tablets (25 mg total) by mouth daily      pantoprazole (PROTONIX) 40 mg tablet Take 1 tablet (40 mg total) by mouth daily in the early morning 30 tablet 5    ticagrelor (BRILINTA) 90 MG Take 1 tablet (90 mg total) by mouth every 12 (twelve) hours 60 tablet 5     No current facility-administered medications for this visit.       Medication compliance: Yes   Comments: Pt reports to be compliant with medications. Physical Limitations: None    Fall Risk: Low   Comments: Ambulates with a steady gait with no assist device. He denies any falls, LOB or syncope.     Cultural needs: none      CAD Risk Factors:  Cholesterol: Yes ()  HTN: No  DM: Pre-diabetes  Obesity: Yes (BMI 30.56 kg/m2)  Inactivity: No (No formal HEP but very active w/ work and hobbies) Recently patient has began a walking program.      EXERCISE ASSESSMENT:    Initial Fitness Assessment: Submax Treadmill ETT  Rest: HR 70, /72, SpO2 98%, Asx  Exercise: HR 99, /84, SpO2 99%, Asx, 5.8 METs   Reason for Discontinuation: 30 above SBP  Recovery: HR 70, /68, SpO2 99%, Asx      ECG INTERPRETATION:  NSR    Current Functional Status:  Occupation: full time job, canine officer in [place]  Recreation/Physical Activity: [place]  ADL’s:No limitations  [place]: No limitations  Home exercise: No formal HEP but very active  Other Comments: None      SMART Exercise Goals:   10% improvement in functional capacity based on max METs achieved in initial fitness assessment  increased exercise capacity by 40% based on peak METs tolerated in cardiac rehab exercise session  maintain > 150 minutes per week of moderate intensity exercise    Patient Specific EXERCISE GOALS:       Return to work unrestricted  Begin a formal HEP  Have the ability to be able to complete hobbies w/o issue    Functional Capacity Screening Tool:  [facility] Activity Status Index:  6.61 METs      PSYCHOSOCIAL ASSESSMENT:    Date of last Assessment:   "1/3/2024  Depression screening:  PHQ-9 = 2    Interpretation:  1-4 = Minimal Depression  Anxiety screening:  JOO-7 = 1    Interpretation: 0-4  = Not anxious    Pt self-report of depression and anxiety   Patient reports they are coping well with good social support and denies depression or anxiety    Self-reported stress level:  3/10   Stressors:  Health, work  Stress Management Tools: practice Relaxation Techniques, spend time outside, and enjoy a hobby    SMART Psychosocial Goals:     Reduce perceived stress to 1-3/10, improved [facility] QOL < 27, PHQ-9 - reduced severity by one level, improved sleeping habits, feel less tired with more energy, and Improved appetite control    Patient Specific PSYCHOCOSOCIAL GOALS:    Use hobbies as stress relief  Spend time with family and friends    Quality of Life Screen:  (Higher score indicates disease impact on QOL)  [facility] COOP score: 28/45     Social Support:   siblings and friends  Community/Social Activities: Spend time with friends and family     Psychosocial Assessment as it relates to rehabilitation:   Patient denies issues with his/her family or home life that may affect their rehabilitation efforts.       NUTRITION ASSESSMENT:    Initial Weight:  213  Current Weight: 213    No weight loss during this reporting period.    Height:   Ht Readings from Last 1 Encounters:   02/06/25 5' 10\" (1.778 m)       Rate Your Plate Score: 47/81    Diabetes: Pre-diabetes  A1c: 6.1    last measured: 12/13/2024    Lipid management: Discussed diet and lipid management and Last lipid profile 12/13/2024  Chol 185    HDL 43      Current Dietary Habits:  Patient is currently trying to lose weight. He does not have a goal weight at this time. He is cutting back on his [place] iced tea intake. He is trying to make healthier choices. He has reduced daily sodium intake. He is aware to hydrate more often during the day.     SMART Nutrition Goals:   reduced BMI to < 25, LDL " <100, fasting BG , Improved Rate Your Plate score  >58, and 2.5-5%  wt loss    Patient Specific NUTRITION GOALS:     1. Drink less iced tea   2. Watch sugar intake   3. Weight loss    Drug/Alcohol Use:   Kalli    OTHER CORE COMPONENT ASSESSMENT:    Tobacco Use:     N/A: Pt has a remote history of smoking. He quit in 2018 and continues to abstain.     Anginal Symptoms:  chest pressure and arm pain at time of the event.  Patient had one occasion of minimal, fleeting anginal (? )symptoms. He does not have Nitro RX.    NTG use: No prescription    SMART Goals:   consistent, controlled resting BP < 130/80, medication compliance, and abstain from smoking    Patient Specific CORE COMPONENT GOALS:    Improve BP  Check BP at home (needs new home cuff)  Remain asymptomatic      INDIVIDUALIZED TREATMENT PLAN      EXERCISE GOALS and PLAN      Progress toward Exercise goals:   Will continue to educate and progress as tolerated., Goals met: patient is compliant attending his CR sessions and is able to work at a 5.14 MET Level. He is very active at home, very little sitting time. He has returned to work full time/duty as a  in [place].    Exercise Plan:    education on home exercise guidelines, home exercise 30+ mins 2 days opposite CR, and Group class: Risk Factors for Heart Disease    The patient was counseled on exercise guidelines to achieve a minimum of 150 mins/wk of moderate intensity (RPE 4-6) exercise and encouraged to add 1-2 days of exercise on opposite days of cardiac rehab as tolerated.       PHYSICIAN PRESCRIBED EXERCISE:    Current Aerobic Exercise Prescription:      Frequency: 2-3 days/week   Supplement with home exercise 2+ days/wk as tolerated       Minutes: 45-50         METS: 5.14           HR: RHR +30-40bpm   RPE: 4-6/10         Modalities: Treadmill, UBE, Lifecycle, Rower, and Recumbent bike     Exercise workloads will be progressed gradually as tolerated, within limits of patient's  ability, and according to the patient's response to the exercise program.      Aerobic Exercise Prescription Plan for Progression   Frequency: 2-3 days/week of cardiac rehab       Supplement with home exercise 2+ days/wk as tolerated    Minutes: 45-50      >150 mins/wk of moderate intensity exercise   METS: 5.14-6.50   HR: RHR +30-40bpm     RPE: 4-6/10   Modalities: Treadmill, Airdyne bike, UBE, Lifecycle, Elliptical, Rower, and Recumbent bike    Strength trainin-3 days / week  12-15 repetitions  1-2 sets per modality    Modalities: Leg Press, Chest Press, and Pull Downs    Home Exercise: No formal HEP but active around his property and hunting/fishing. He is now walking long distances daily.     Exercise Education: benefit of exercise for CAD risk factors, home exercise guidelines, AHA guidelines to achieve >150 mins/wk of moderate exercise, RPE scale, and Group class: Risk Factors for Heart Disease     Readiness to change: Action:  (Changing behavior)      NUTRITION GOALS AND PLAN      Nutritional   Reviewed patient's Rate your Plate. Discussed key elements of heart healthy eating. Reviewed patient goals for dietary modifications and their clinical implications.  Reviewed most recent lipid profile.     Patient's progress toward Nutrition goals:    Will continue to educate and progress as tolerated., Goals not met: no weight loss during this reporting period.  , he has reduced daily sodium, saturated fat and sugar intake. He is more aware to hydrate throughout the day.  Patient states he does eat lean meats and fish.       Nutrition Plan:   group class: Reading Food Labels and group Class: Heart Healthy Eating    Measurable goals were based Rate Your Plate Dietary Self-Assessment. These are the areas in which the patient could score higher on the assessment.  Goals include recommendations for a heart healthy diet based on American Heart Association.    Nutrition Education:   heart healthy eating  "principles  weight loss and management strategies  nutrition for  lipid management  nutrition for Improved BG control  group class: Heart Healthy Eating  group class:  Label Reading  \"Healthy Holiday Eating\"  \"Plant based Eating\"  \"Focus on Fiber\"    Readiness to change: Action:  (Changing behavior)      PSYCHOSOCIAL GOALS AND PLAN    Psychosocial Assessment as it relates to rehabilitation:   Patient denies issues with his family or home life that may affect their rehabilitation efforts. Patient has family support. He lost his wife to cancer < 1 year ago. He is the primary caregiver for an uncle w/cancer and depression. He is providing transportation for all of his uncle's medical appointments.     Patient's progress toward Psychosocial goals:   Stress level remains low at 2-3/10. He continues to deny anxiety/depression. He notes that his needs are being met. CR staff will continue to offer support as needed.     Psychosocial Intervention/plan:   Class: Stress and Your Health, Practice relaxation techniques, Exercise, Spend time outdoors, and Enjoy a hobby such as hunting/fishing .    Psychosocial Education: signs/sxs of depression, stress management techniques, benefits of enrolling in [facility], depression and CAD, and class:  Stress and Your Health     Information to utilize [employer] was provided as well as contact information for counseling through  Behavioral Health and group psychotherapy groups available.    Readiness to change: Action:  (Changing behavior)      OTHER CORE COMPONENTS GOALS and PLAN      Blood Pressure will be monitored throughout the program and cardiologist will be notified of elevated trends.  Pt will be encouraged to monitor home BP if advised by cardiologist.    Tobacco Plan:   N/A:  Pt has a remote history of smoking.  He quit 2018 and continues to abstain. He avoids second hand smoke and other irritants.     Progress toward Core Component goals:   Pt is progressing and showing " improvement  toward the following goals:  consistent resting BP < 130/80.  , Will continue to educate and progress as tolerated., Goals met: medication compliance, daily monitoring of vitals at home and moderate exercise at 150 min/week minimum. .    Other Core Components Intervention:   group class: Understanding Heart Disease, group class: Common Heart Medications, medication compliance, complete abstention from smoking, avoid places with second hand smoke, engage in regular exercise, and monitor home BP    Group and Individual Education:  understanding high blood pressure and it's relationship to CAD, components of blood pressure management, group class: Understanding Heart Disease, and group class:  Common Heart Medications    Readiness to change: Action:  (Changing behavior)

## 2025-02-21 ENCOUNTER — CLINICAL SUPPORT (OUTPATIENT)
Dept: CARDIAC REHAB | Facility: HOSPITAL | Age: 50
End: 2025-02-21
Payer: COMMERCIAL

## 2025-02-21 DIAGNOSIS — I21.3 ST ELEVATION MYOCARDIAL INFARCTION (STEMI), UNSPECIFIED ARTERY (HCC): ICD-10-CM

## 2025-02-21 PROCEDURE — 93798 PHYS/QHP OP CAR RHAB W/ECG: CPT

## 2025-02-24 ENCOUNTER — CLINICAL SUPPORT (OUTPATIENT)
Dept: CARDIAC REHAB | Facility: HOSPITAL | Age: 50
End: 2025-02-24
Payer: COMMERCIAL

## 2025-02-24 DIAGNOSIS — I21.3 ST ELEVATION MYOCARDIAL INFARCTION (STEMI), UNSPECIFIED ARTERY (HCC): ICD-10-CM

## 2025-02-24 PROCEDURE — 93798 PHYS/QHP OP CAR RHAB W/ECG: CPT

## 2025-02-26 ENCOUNTER — APPOINTMENT (OUTPATIENT)
Dept: CARDIAC REHAB | Facility: HOSPITAL | Age: 50
End: 2025-02-26
Payer: COMMERCIAL

## 2025-02-28 ENCOUNTER — CLINICAL SUPPORT (OUTPATIENT)
Dept: CARDIAC REHAB | Facility: HOSPITAL | Age: 50
End: 2025-02-28
Payer: COMMERCIAL

## 2025-02-28 DIAGNOSIS — I21.3 ST ELEVATION MYOCARDIAL INFARCTION (STEMI), UNSPECIFIED ARTERY (HCC): ICD-10-CM

## 2025-02-28 PROCEDURE — 93798 PHYS/QHP OP CAR RHAB W/ECG: CPT

## 2025-03-05 ENCOUNTER — CLINICAL SUPPORT (OUTPATIENT)
Dept: CARDIAC REHAB | Facility: HOSPITAL | Age: 50
End: 2025-03-05
Payer: COMMERCIAL

## 2025-03-05 DIAGNOSIS — I21.3 ST ELEVATION MYOCARDIAL INFARCTION (STEMI), UNSPECIFIED ARTERY (HCC): ICD-10-CM

## 2025-03-05 PROCEDURE — 93798 PHYS/QHP OP CAR RHAB W/ECG: CPT

## 2025-03-07 ENCOUNTER — CLINICAL SUPPORT (OUTPATIENT)
Dept: CARDIAC REHAB | Facility: HOSPITAL | Age: 50
End: 2025-03-07
Payer: COMMERCIAL

## 2025-03-07 DIAGNOSIS — I21.3 ST ELEVATION MYOCARDIAL INFARCTION (STEMI), UNSPECIFIED ARTERY (HCC): ICD-10-CM

## 2025-03-07 PROCEDURE — 93798 PHYS/QHP OP CAR RHAB W/ECG: CPT

## 2025-03-07 NOTE — PROGRESS NOTES
Exercise Session Details    Med Rec completed per patient   Allergies reviewed  No ORAL antibiotics in last 14 days

## 2025-03-10 ENCOUNTER — CLINICAL SUPPORT (OUTPATIENT)
Dept: CARDIAC REHAB | Facility: HOSPITAL | Age: 50
End: 2025-03-10
Payer: COMMERCIAL

## 2025-03-10 DIAGNOSIS — I21.3 ST ELEVATION MYOCARDIAL INFARCTION (STEMI), UNSPECIFIED ARTERY (HCC): ICD-10-CM

## 2025-03-10 PROCEDURE — 93798 PHYS/QHP OP CAR RHAB W/ECG: CPT

## 2025-03-12 ENCOUNTER — APPOINTMENT (OUTPATIENT)
Dept: CARDIAC REHAB | Facility: HOSPITAL | Age: 50
End: 2025-03-12
Payer: COMMERCIAL

## 2025-03-14 ENCOUNTER — CLINICAL SUPPORT (OUTPATIENT)
Dept: CARDIAC REHAB | Facility: HOSPITAL | Age: 50
End: 2025-03-14
Payer: COMMERCIAL

## 2025-03-14 DIAGNOSIS — I21.3 ST ELEVATION MYOCARDIAL INFARCTION (STEMI), UNSPECIFIED ARTERY (HCC): Primary | ICD-10-CM

## 2025-03-14 PROCEDURE — 93798 PHYS/QHP OP CAR RHAB W/ECG: CPT

## 2025-03-17 ENCOUNTER — APPOINTMENT (OUTPATIENT)
Dept: CARDIAC REHAB | Facility: HOSPITAL | Age: 50
End: 2025-03-17
Payer: COMMERCIAL

## 2025-03-19 ENCOUNTER — APPOINTMENT (OUTPATIENT)
Dept: CARDIAC REHAB | Facility: HOSPITAL | Age: 50
End: 2025-03-19
Payer: COMMERCIAL

## 2025-03-21 ENCOUNTER — APPOINTMENT (OUTPATIENT)
Dept: CARDIAC REHAB | Facility: HOSPITAL | Age: 50
End: 2025-03-21
Payer: COMMERCIAL

## 2025-03-21 NOTE — PROGRESS NOTES
CARDIAC REHABILITATION   ASSESSMENT AND INDIVIDUALIZED TREATMENT PLAN  DISCHARGE            Today's date: 3/21/2025   # of Exercise Sessions Completed:   Patient name: Benjamin W Kocher      : 1975  Age: 50 y.o.       MRN: [id number]  Referring Physician: Ranjeet Jc MD  Cardiologist: Jimy Shannon MD  Provider: Joivta  Clinician: uRby Patton, MPT,  CCRP        Treatment is tailored to this patient's individual needs.  The ITP was reviewed with the patient and all questions were answered to their satisfaction.  Additional ITP documentation can be found electronically including daily and monthly exercise summaries, daily session notes with ECG summaries, education notes, daily medication reconciliation, and daily physician supervision.      Comments: Alexx has completed 25 exercise sessions at Cardiac Rehab post STEMI w/ intervention. He is currently able to work at a 5.14 MET Level. He has correct hemodynamic responses to the exercise. He rates his program a 4 on a 1-10 RPE Scale. Telemetry reveals NSR w/T Wave inversion. He monitors his vitals daily. His resting BP continues to improve.     He has returned to work full/time/duty as an [place] . His work schedule no longer allows him to attend cardiac Rehab. He states he is performing a HEP and plans to continue. He had no further questions or concerns for the Cardiac Rehab staff.      DISCHARGE SUMMARY 2025    Resting BP  120/62 - 134/70,  HR 77 - 86  Exercise /70- 164/76.  HR 89 - 103  Exercise session details:  45-50 minutes,  5.14 METs  Telemetry:  NSR  Symptoms: Patient has not had any symptoms during this reporting period.   Home exercise/ADLs: He has resumed all ADLs w/out issue  Patient's subjective report of progress: Patient has returned to work full time/duty as a  in [place]. His schedule no longer allows him to attend Cardiac Rehab.     Dx: STEMI w/ intervention  Description of  Diagnosis: ST elevation myocardial infarction involving left anterior descending (LAD) coronary artery with angioplasty x 3 and drug eluting stent x 3 to mid LAD and drug eluting stent x 1 to mid RCA  Date of onset: 2024  Other Cardiac History: Hx CVA, hx DVT, HLD, family hx        ASSESSMENT    Medical History:   Past Medical History:   Diagnosis Date    COPD (chronic obstructive pulmonary disease) (HCC)     Coronary artery disease     DVT of lower limb, acute (HCC)     Hyperlipidemia     Ischemic cardiomyopathy     MI (myocardial infarction)     STEMI (ST elevation myocardial infarction)     anterior       Family History:  Family History   Problem Relation Age of Onset    Hypertension Mother     Heart attack Father 42            Heart failure Paternal Aunt         pacemaker    Heart disease Paternal Uncle     Heart failure Paternal Grandmother         pacemaker    Heart attack Paternal Grandfather         cabg    Heart disease Paternal Grandfather 62        heart transplant       Allergies:   Patient has no known allergies.    Current Medications:   Current Outpatient Medications   Medication Sig Dispense Refill    albuterol (PROVENTIL HFA,VENTOLIN HFA) 90 mcg/act inhaler Inhale 2 puffs every 4 (four) hours as needed for wheezing 6.7 g 0    aspirin (ECOTRIN LOW STRENGTH) 81 mg EC tablet Take 1 tablet (81 mg total) by mouth daily 30 tablet 5    atorvastatin (LIPITOR) 80 mg tablet Take 1 tablet (80 mg total) by mouth daily with dinner 30 tablet 5    clopidogrel (PLAVIX) 75 mg tablet Take 1 tablet (75 mg total) by mouth daily Do not start before 2025. 30 tablet 5    clopidogrel (Plavix) 75 mg tablet Take 8 tablets (600 mg total) by mouth once for 1 dose Do not start before 2025. 8 tablet 0    lidocaine (LIDODERM) 5 % Apply 1 patch topically over 12 hours daily Remove & Discard patch within 12 hours or as directed by MD (Patient taking differently: Apply 1 patch topically if  needed (moderate pain)) 30 patch 0    losartan (COZAAR) 25 mg tablet Take 1 tablet (25 mg total) by mouth daily 90 tablet 0    metoprolol succinate (TOPROL-XL) 50 mg 24 hr tablet Take 0.5 tablets (25 mg total) by mouth daily 45 tablet 0    pantoprazole (PROTONIX) 40 mg tablet Take 1 tablet (40 mg total) by mouth daily in the early morning 30 tablet 5    ticagrelor (BRILINTA) 90 MG Take 1 tablet (90 mg total) by mouth every 12 (twelve) hours 60 tablet 5     No current facility-administered medications for this visit.       Medication compliance: Yes   Comments: Pt reports to be compliant with medications.     Physical Limitations: None    Fall Risk: Low   Comments: Ambulates with a steady gait with no assist device. He denies any falls, LOB or syncope.     Cultural needs: none    CAD Risk Factors:  Cholesterol: Yes ()  HTN: No  DM: Pre-diabetes  Obesity: Yes (BMI 30.56 kg/m2)  Inactivity: No (No formal HEP but very active w/ work and hobbies) Recently patient has began a walking program.      EXERCISE ASSESSMENT:    Initial Fitness Assessment: Submax Treadmill ETT  Rest: HR 70, /72, SpO2 98%, Asx  Exercise: HR 99, /84, SpO2 99%, Asx, 5.8 METs   Reason for Discontinuation: 30 above SBP  Recovery: HR 70, /68, SpO2 99%, Asx      ECG INTERPRETATION:  NSR    Current Functional Status:  Occupation: full time job, canine officer in [place]  Recreation/Physical Activity: Hunts, fishes, hikes  ADL’s:No limitations  [place]: No limitations  Home exercise: No formal HEP but very active; now walks daily  Other Comments: None      SMART Exercise Goals:   10% improvement in functional capacity based on max METs achieved in initial fitness assessment  increased exercise capacity by 40% based on peak METs tolerated in cardiac rehab exercise session  maintain > 150 minutes per week of moderate intensity exercise    Patient Specific EXERCISE GOALS:       Return to work unrestricted  Begin a formal  "HEP  Have the ability to be able to complete hobbies w/o issue    Patient has met all of the goals.    Functional Capacity Screening Tool:  [facility] Activity Status Index:  6.61 METs      PSYCHOSOCIAL ASSESSMENT:    Date of last Assessment:  1/3/2024  Depression screening:  PHQ-9 = 2    Interpretation:  1-4 = Minimal Depression  Anxiety screening:  JOO-7 = 1    Interpretation: 0-4  = Not anxious    Pt self-report of depression and anxiety   Patient reports he is coping well with good social support and denies depression or anxiety    Self-reported stress level:  3/10   Stressors:  Health, work  Stress Management Tools: practice Relaxation Techniques, spend time outside, and enjoy a hobby    SMART Psychosocial Goals:     Reduce perceived stress to 1-3/10, improved [facility] QOL < 27, PHQ-9 - reduced severity by one level, improved sleeping habits, feel less tired with more energy, and Improved appetite control    Patient Specific PSYCHOCOSOCIAL GOALS:    Use hobbies as stress relief  Spend time with family and friends    Patient has met these two goals.    Quality of Life Screen:  (Higher score indicates disease impact on QOL)  [facility] COOP score: 28/45     Social Support:   siblings and friends  Community/Social Activities: Spend time with friends and family     Psychosocial Assessment as it relates to rehabilitation:   Patient denies issues with his family or home life that may affect their rehabilitation efforts.       NUTRITION ASSESSMENT:    Initial Weight:  213  Current Weight: 213    No weight loss.     Height:   Ht Readings from Last 1 Encounters:   02/06/25 5' 10\" (1.778 m)       Rate Your Plate Score: 47/81    Diabetes: Pre-diabetes  A1c: 6.1    last measured: 12/13/2024    Lipid management: Discussed diet and lipid management and Last lipid profile 12/13/2024  Chol 185    HDL 43      Current Dietary Habits:  Patient is currently trying to lose weight. He does not have a goal weight at this " time. He is cutting back on his [place] iced tea intake. He is trying to make healthier choices. He has reduced daily sodium intake. He is aware to hydrate more often during the day.     SMART Nutrition Goals:   reduced BMI to < 25, LDL <100, fasting BG , Improved Rate Your Plate score  >58, and 2.5-5%  wt loss    Patient Specific NUTRITION GOALS:     1. Drink less iced tea   2. Watch sugar intake   3. Weight loss    Patient has reduced intake of sugary drinks. He has not had any weight loss.    Drug/Alcohol Use:   Kalli    OTHER CORE COMPONENT ASSESSMENT:    Tobacco Use:     N/A: Pt has a remote history of smoking. He quit in 2018 and continues to abstain.     Anginal Symptoms:  chest pressure and arm pain at time of the event.  Patient had one occasion of minimal, fleeting anginal (? )symptoms. He does not have Nitro RX.    NTG use: No prescription    SMART Goals:   consistent, controlled resting BP < 130/80, medication compliance, and abstain from smoking    Patient Specific CORE COMPONENT GOALS:    Improve BP  Check BP at home (needs new home cuff)  Remain asymptomatic    Patient has met all of these goals.    INDIVIDUALIZED TREATMENT PLAN      EXERCISE GOALS and PLAN      Progress toward Exercise goals:   Will continue to educate and progress as tolerated., Goals met: patient is compliant attending his CR sessions and is able to work at a 5.14 MET Level. He is very active at home, very little sitting time. He has returned to work full time/duty as a  in [place].    Exercise Plan:    education on home exercise guidelines, home exercise 30+ mins 2 days opposite CR, and Group class: Risk Factors for Heart Disease    The patient was counseled on exercise guidelines to achieve a minimum of 150 mins/wk of moderate intensity (RPE 4-6) exercise and encouraged to add 1-2 days of exercise on opposite days of cardiac rehab as tolerated.       PHYSICIAN PRESCRIBED EXERCISE:    Current Aerobic  Exercise Prescription:      Frequency: 2-3 days/week   Supplement with home exercise 2+ days/wk as tolerated       Minutes: 45-50         METS: 5.14           HR: RHR +30-40bpm   RPE: 4-6/10         Modalities: Treadmill, UBE, Lifecycle, Rower, and Recumbent bike     Exercise workloads will be progressed gradually as tolerated, within limits of patient's ability, and according to the patient's response to the exercise program.        Discharge Exercise Summary   Frequency: 2-3 days/week of cardiac rehab       Supplement with home exercise 2+ days/wk as tolerated    Minutes: 45-50      >150 mins/wk of moderate intensity exercise   METS: 5.14   HR: RHR +30-40bpm     RPE: 4/10   Modalities: Treadmill, Airdyne bike, UBE, Lifecycle, Elliptical, Rower, and Recumbent bike    Strength trainin-3 days / week  12-15 repetitions  1-2 sets per modality    Modalities: Leg Press, Chest Press, and Pull Downs    Home Exercise: No formal HEP but active around his property and hunting/fishing. He is now walking long distances daily.     Exercise Education: benefit of exercise for CAD risk factors, home exercise guidelines, AHA guidelines to achieve >150 mins/wk of moderate exercise, RPE scale, and Group class: Risk Factors for Heart Disease     Readiness to change: Action:  (Changing behavior)      NUTRITION GOALS AND PLAN      Nutritional   Reviewed patient's Rate your Plate. Discussed key elements of heart healthy eating. Reviewed patient goals for dietary modifications and their clinical implications.  Reviewed most recent lipid profile.     Patient's progress toward Nutrition goals:    Will continue to educate and progress as tolerated., Goals not met: no weight loss during this reporting period.  , he has reduced daily sodium, saturated fat and sugar intake. He is more aware to hydrate throughout the day.  Patient states he does eat lean meats and fish. He has significantly reduced drinking Iced Tea.      Nutrition Plan:  "  group class: Reading Food Labels and group Class: Heart Healthy Eating    Measurable goals were based Rate Your Plate Dietary Self-Assessment. These are the areas in which the patient could score higher on the assessment.  Goals include recommendations for a heart healthy diet based on American Heart Association.    Nutrition Education:   heart healthy eating principles  weight loss and management strategies  nutrition for  lipid management  nutrition for Improved BG control  group class: Heart Healthy Eating  group class:  Label Reading  \"Healthy Holiday Eating\"  \"Plant based Eating\"  \"Focus on Fiber\"    Readiness to change: Action:  (Changing behavior)      PSYCHOSOCIAL GOALS AND PLAN    Psychosocial Assessment as it relates to rehabilitation:   Patient denies issues with his family or home life that may affect their rehabilitation efforts. Patient has family support. He lost his wife to cancer < 1 year ago. He is the primary caregiver for an uncle w/cancer and depression. He is providing transportation for all of his uncle's medical appointments.     Patient's progress toward Psychosocial goals:   Stress level remains low at 2-3/10. He continues to deny anxiety/depression. He notes that his needs are being met. CR staff will continue to offer support as needed.     Psychosocial Intervention/plan:   Class: Stress and Your Health, Practice relaxation techniques, Exercise, Spend time outdoors, and Enjoy a hobby such as hunting/fishing .    Psychosocial Education: signs/sxs of depression, stress management techniques, benefits of enrolling in [employer], depression and CAD, and class:  Stress and Your Health     Information to utilize [employer] was provided as well as contact information for counseling through  Behavioral Health and group psychotherapy groups available.    Readiness to change: Action:  (Changing behavior)      OTHER CORE COMPONENTS GOALS and PLAN      Blood Pressure will be monitored " throughout the program and cardiologist will be notified of elevated trends.  Pt will be encouraged to monitor home BP if advised by cardiologist.    Tobacco Plan:   N/A:  Pt has a remote history of smoking.  He quit 2018 and continues to abstain. He avoids second hand smoke and other irritants.     Progress toward Core Component goals:   Pt is progressing and showing improvement  toward the following goals:  consistent resting BP < 130/80.  , Will continue to educate and progress as tolerated., Goals met: medication compliance, daily monitoring of vitals at home and moderate exercise at 150 min/week minimum. .    Other Core Components Intervention:   group class: Understanding Heart Disease, group class: Common Heart Medications, medication compliance, complete abstention from smoking, avoid places with second hand smoke, engage in regular exercise, and monitor home BP    Group and Individual Education:  understanding high blood pressure and it's relationship to CAD, components of blood pressure management, group class: Understanding Heart Disease, and group class:  Common Heart Medications    Readiness to change: Action:  (Changing behavior)

## 2025-03-24 ENCOUNTER — APPOINTMENT (OUTPATIENT)
Dept: CARDIAC REHAB | Facility: HOSPITAL | Age: 50
End: 2025-03-24
Payer: COMMERCIAL

## 2025-03-26 ENCOUNTER — NURSE TRIAGE (OUTPATIENT)
Age: 50
End: 2025-03-26

## 2025-03-26 ENCOUNTER — APPOINTMENT (OUTPATIENT)
Dept: CARDIAC REHAB | Facility: HOSPITAL | Age: 50
End: 2025-03-26
Payer: COMMERCIAL

## 2025-03-26 NOTE — TELEPHONE ENCOUNTER
"FOLLOW UP: chest pain/discomfort     REASON FOR CONVERSATION: Chest Pain    SYMPTOMS: SOB on exertion, chest discomfort and pain    OTHER: VS Today /76 HR 92  Pt has an appt scheduled in April 25, 2025    DISPOSITION: Go to ED/UCC Now (Or to Office with PCP Approval)    Advised will notify provider, advised with current active chest pain/discomfort recommended ED.   Will be going to [place] [facility].      Reason for Disposition   Chest pain or 'angina' comes and goes and is happening more often (increasing in frequency) or getting worse (increasing in severity) (Exception: Chest pains that last only a few seconds.)    Answer Assessment - Initial Assessment Questions  1. LOCATION: \"Where does it hurt?\"        Chest pain-left side, pain level 1 or 2   2. RADIATION: \"Does the pain go anywhere else?\" (e.g., into neck, jaw, arms, back)      Left arm, tricuspid   3. ONSET: \"When did the chest pain begin?\" (Minutes, hours or days)       Since sept 13 th  4. PATTERN: \"Does the pain come and go, or has it been constant since it started?\"  \"Does it get worse with exertion?\"       Exertion make it slightly worse, sometime sitting can be bad  5. DURATION: \"How long does it last\" (e.g., seconds, minutes, hours)      Other day few hrs, through out the day 20 mins,half hr  6. SEVERITY: \"How bad is the pain?\"  (e.g., Scale 1-10; mild, moderate, or severe)      Mild, definitely slow down  7. CARDIAC RISK FACTORS: \"Do you have any history of heart problems or risk factors for heart disease?\" (e.g., angina, prior heart attack; diabetes, high blood pressure, high cholesterol, smoker, or strong family history of heart disease)      Heart attack in Sept  8. PULMONARY RISK FACTORS: \"Do you have any history of lung disease?\"  (e.g., blood clots in lung, asthma, emphysema, birth control pills)      emphysema  9. CAUSE: \"What do you think is causing the chest pain?\"      Unsure   10. OTHER SYMPTOMS: \"Do you have any other symptoms?\" " (e.g., dizziness, nausea, vomiting, sweating, fever, difficulty breathing, cough)        SOB occasionally, ocasionally dizziness    Protocols used: Chest Pain-Adult-[place]

## 2025-03-27 ENCOUNTER — APPOINTMENT (EMERGENCY)
Dept: RADIOLOGY | Facility: HOSPITAL | Age: 50
End: 2025-03-27
Payer: COMMERCIAL

## 2025-03-27 ENCOUNTER — HOSPITAL ENCOUNTER (EMERGENCY)
Facility: HOSPITAL | Age: 50
Discharge: HOME | End: 2025-03-28
Attending: EMERGENCY MEDICINE
Payer: COMMERCIAL

## 2025-03-27 DIAGNOSIS — R07.9 CHEST PAIN: Primary | ICD-10-CM

## 2025-03-27 LAB
ALBUMIN SERPL BCG-MCNC: 4.2 G/DL (ref 3.5–5)
ALP SERPL-CCNC: 109 U/L (ref 34–104)
ALT SERPL W P-5'-P-CCNC: 46 U/L (ref 7–52)
ANION GAP SERPL CALCULATED.3IONS-SCNC: 7 MMOL/L (ref 4–13)
AST SERPL W P-5'-P-CCNC: 23 U/L (ref 13–39)
BASOPHILS # BLD AUTO: 0.07 THOUSANDS/ÂΜL (ref 0–0.1)
BASOPHILS NFR BLD AUTO: 1 % (ref 0–1)
BILIRUB SERPL-MCNC: 0.38 MG/DL (ref 0.2–1)
BUN SERPL-MCNC: 16 MG/DL (ref 5–25)
CALCIUM SERPL-MCNC: 9 MG/DL (ref 8.4–10.2)
CARDIAC TROPONIN I PNL SERPL HS: 5 NG/L (ref ?–50)
CHLORIDE SERPL-SCNC: 104 MMOL/L (ref 96–108)
CO2 SERPL-SCNC: 26 MMOL/L (ref 21–32)
CREAT SERPL-MCNC: 1.09 MG/DL (ref 0.6–1.3)
EOSINOPHIL # BLD AUTO: 0.35 THOUSAND/ÂΜL (ref 0–0.61)
EOSINOPHIL NFR BLD AUTO: 3 % (ref 0–6)
ERYTHROCYTE [DISTWIDTH] IN BLOOD BY AUTOMATED COUNT: 14.4 % (ref 11.6–15.1)
GFR SERPL CREATININE-BSD FRML MDRD: 78 ML/MIN/1.73SQ M
GLUCOSE SERPL-MCNC: 103 MG/DL (ref 65–140)
HCT VFR BLD AUTO: 41.2 % (ref 36.5–49.3)
HGB BLD-MCNC: 14 G/DL (ref 12–17)
IMM GRANULOCYTES # BLD AUTO: 0.05 THOUSAND/UL (ref 0–0.2)
IMM GRANULOCYTES NFR BLD AUTO: 0 % (ref 0–2)
LYMPHOCYTES # BLD AUTO: 4.39 THOUSANDS/ÂΜL (ref 0.6–4.47)
LYMPHOCYTES NFR BLD AUTO: 31 % (ref 14–44)
MCH RBC QN AUTO: 32.1 PG (ref 26.8–34.3)
MCHC RBC AUTO-ENTMCNC: 34 G/DL (ref 31.4–37.4)
MCV RBC AUTO: 95 FL (ref 82–98)
MONOCYTES # BLD AUTO: 1.15 THOUSAND/ÂΜL (ref 0.17–1.22)
MONOCYTES NFR BLD AUTO: 8 % (ref 4–12)
NEUTROPHILS # BLD AUTO: 7.97 THOUSANDS/ÂΜL (ref 1.85–7.62)
NEUTS SEG NFR BLD AUTO: 57 % (ref 43–75)
NRBC BLD AUTO-RTO: 0 /100 WBCS
PLATELET # BLD AUTO: 306 THOUSANDS/UL (ref 149–390)
PMV BLD AUTO: 9.5 FL (ref 8.9–12.7)
POTASSIUM SERPL-SCNC: 3.7 MMOL/L (ref 3.5–5.3)
PROT SERPL-MCNC: 7 G/DL (ref 6.4–8.4)
RBC # BLD AUTO: 4.36 MILLION/UL (ref 3.88–5.62)
SODIUM SERPL-SCNC: 137 MMOL/L (ref 135–147)
WBC # BLD AUTO: 13.98 THOUSAND/UL (ref 4.31–10.16)

## 2025-03-27 PROCEDURE — 93005 ELECTROCARDIOGRAM TRACING: CPT

## 2025-03-27 PROCEDURE — 71046 X-RAY EXAM CHEST 2 VIEWS: CPT

## 2025-03-27 PROCEDURE — 85025 COMPLETE CBC W/AUTO DIFF WBC: CPT

## 2025-03-27 PROCEDURE — 99285 EMERGENCY DEPT VISIT HI MDM: CPT | Performed by: EMERGENCY MEDICINE

## 2025-03-27 PROCEDURE — 80053 COMPREHEN METABOLIC PANEL: CPT

## 2025-03-27 PROCEDURE — 36415 COLL VENOUS BLD VENIPUNCTURE: CPT

## 2025-03-27 PROCEDURE — 84484 ASSAY OF TROPONIN QUANT: CPT

## 2025-03-27 PROCEDURE — 99285 EMERGENCY DEPT VISIT HI MDM: CPT

## 2025-03-27 RX ORDER — HYDRALAZINE HYDROCHLORIDE 20 MG/ML
10 INJECTION INTRAMUSCULAR; INTRAVENOUS ONCE
Status: DISCONTINUED | OUTPATIENT
Start: 2025-03-27 | End: 2025-03-28 | Stop reason: HOSPADM

## 2025-03-28 VITALS
OXYGEN SATURATION: 93 % | SYSTOLIC BLOOD PRESSURE: 132 MMHG | WEIGHT: 213 LBS | DIASTOLIC BLOOD PRESSURE: 63 MMHG | BODY MASS INDEX: 30.49 KG/M2 | RESPIRATION RATE: 16 BRPM | HEIGHT: 70 IN | HEART RATE: 65 BPM | TEMPERATURE: 98 F

## 2025-03-28 LAB
2HR DELTA HS TROPONIN: 0 NG/L
ATRIAL RATE: 78 BPM
CARDIAC TROPONIN I PNL SERPL HS: 5 NG/L (ref ?–50)
P AXIS: 49 DEGREES
PR INTERVAL: 148 MS
QRS AXIS: 52 DEGREES
QRSD INTERVAL: 138 MS
QT INTERVAL: 400 MS
QTC INTERVAL: 456 MS
T WAVE AXIS: 35 DEGREES
VENTRICULAR RATE: 78 BPM

## 2025-03-28 PROCEDURE — 84484 ASSAY OF TROPONIN QUANT: CPT

## 2025-03-28 PROCEDURE — 93010 ELECTROCARDIOGRAM REPORT: CPT | Performed by: INTERNAL MEDICINE

## 2025-03-28 PROCEDURE — 36415 COLL VENOUS BLD VENIPUNCTURE: CPT

## 2025-03-28 NOTE — DISCHARGE INSTRUCTIONS
A  personal message from Dr. Randy Chapman,  Thank you so much for allowing me to care for you today.    I pride myself in the care and attention I give all my patients.  I hope you were a witness to this tonight.   If for any reason your condition does not improve or worsens, or you have a question that was not answered during your visit you can feel free to text me on my personal phone #  # [phone].   I will answer to your message and continue your care past your emergency room visit.     Please understand that although you are being discharged because your condition has been deemed stable and able to be managed on an outpatient setting. However your condition may worsen as part of the natural progression of the illness/condition, if this occurs please come back to the emergency department for a repeat evaluation.

## 2025-04-16 ENCOUNTER — TELEPHONE (OUTPATIENT)
Age: 50
End: 2025-04-16

## 2025-04-16 NOTE — TELEPHONE ENCOUNTER
Received call from patient regarding a tooth removal he is having done tomorrow. His dentist advised him to reach out and see if he should stop his plavix or not. Please advise.

## 2025-04-16 NOTE — TELEPHONE ENCOUNTER
Spoke with pt and advised him of not being 1 year out from stenting and he should not hold his Plavix per Mitzi. He did state they originally were going to pull 4 teeth but, are only pulling one

## 2025-04-25 ENCOUNTER — OFFICE VISIT (OUTPATIENT)
Dept: CARDIOLOGY CLINIC | Facility: CLINIC | Age: 50
End: 2025-04-25
Payer: COMMERCIAL

## 2025-04-25 VITALS
SYSTOLIC BLOOD PRESSURE: 112 MMHG | DIASTOLIC BLOOD PRESSURE: 84 MMHG | BODY MASS INDEX: 31.5 KG/M2 | HEIGHT: 70 IN | WEIGHT: 220 LBS | HEART RATE: 70 BPM

## 2025-04-25 DIAGNOSIS — I25.5 ISCHEMIC CARDIOMYOPATHY: ICD-10-CM

## 2025-04-25 DIAGNOSIS — I25.10 CORONARY ARTERY DISEASE INVOLVING NATIVE CORONARY ARTERY OF NATIVE HEART WITHOUT ANGINA PECTORIS: ICD-10-CM

## 2025-04-25 DIAGNOSIS — I50.22 HEART FAILURE WITH MID-RANGE EJECTION FRACTION (HCC): ICD-10-CM

## 2025-04-25 DIAGNOSIS — Z82.49 FAMILY HISTORY OF ISCHEMIC HEART DISEASE (IHD): ICD-10-CM

## 2025-04-25 DIAGNOSIS — I21.3 STEMI (ST ELEVATION MYOCARDIAL INFARCTION) (HCC): ICD-10-CM

## 2025-04-25 DIAGNOSIS — I21.02 ST ELEVATION MYOCARDIAL INFARCTION INVOLVING LEFT ANTERIOR DESCENDING (LAD) CORONARY ARTERY (HCC): Primary | ICD-10-CM

## 2025-04-25 PROCEDURE — 99214 OFFICE O/P EST MOD 30 MIN: CPT | Performed by: NURSE PRACTITIONER

## 2025-04-25 RX ORDER — LOSARTAN POTASSIUM 25 MG/1
25 TABLET ORAL DAILY
Qty: 90 TABLET | Refills: 3 | Status: SHIPPED | OUTPATIENT
Start: 2025-04-25

## 2025-04-25 RX ORDER — METOPROLOL SUCCINATE 25 MG/1
25 TABLET, EXTENDED RELEASE ORAL DAILY
Qty: 90 TABLET | Refills: 3 | Status: SHIPPED | OUTPATIENT
Start: 2025-04-25

## 2025-04-25 RX ORDER — ISOSORBIDE MONONITRATE 30 MG/1
30 TABLET, EXTENDED RELEASE ORAL DAILY
Qty: 30 TABLET | Refills: 3 | Status: SHIPPED | OUTPATIENT
Start: 2025-04-25 | End: 2025-04-30

## 2025-04-25 NOTE — ASSESSMENT & PLAN NOTE
Wt Readings from Last 3 Encounters:   04/25/25 99.8 kg (220 lb)   03/27/25 96.6 kg (213 lb)   02/06/25 96.6 kg (213 lb)

## 2025-04-25 NOTE — H&P (VIEW-ONLY)
Patient ID: Benjamin W Kocher is a 50 y.o. male.        Plan:      Assessment & Plan  ST elevation myocardial infarction involving left anterior descending (LAD) coronary artery (HCC)  PCI to mid LAD (culprit) and mRCA 12/14/2024  Family history of ischemic heart disease (IHD)    Coronary artery disease involving native coronary artery of native heart without angina pectoris  Anterior STEMI 12/14/2024 s/p PCI of mid LAD and mid RCA 12/14/2024  with reported prior MI 2007 with normal cath  Continue DAPT (aspirin and Plavix) through 12/14/2025  Metoprolol 25 mg daily, atorvastatin 80 mg daily  Ischemic cardiomyopathy  EF now improved to 55%  Continue losartan and metoprolol       Follow up Plan/Summary Comments:  Unclear what the cause of his symptoms are.  Given the persistent nature, doubtful that this is related to ischemia.    Trial Imdur 30 mg once daily.  He will report back in a week or so with an update on symptoms.  If no change, will plan to DC medication.    HPI: I had the pleasure of seeing Alexx in the office today accompanied by his sister for a routine visit.    I met been earlier this year when he presented for a hospital follow-up visit after an anterior STEMI 12/14/2024.  At that time, he underwent PCI of mid LAD and mid RCA lesions.    At the time of his last visit, he described persistent episodes of chest discomfort.  Nuclear stress test was performed showing a possible very small zone of ischemia that was felt to be an unlikely abnormality based on the location.    Alexx continues to experience chest pain.  He describes a small localized area on the left sternal border.  This is intermittent in nature and seems to present when the other discomfort is more severe.  There is a second area of discomfort on the lateral chest/apical area, also localized to a small area that radiates into his armpit.  This discomfort is constant but varies in intensity and never completely goes away.  He  "describes the symptoms as similar to what he experienced back in January without change.  He denies any symptoms worsened with exertion.    Alexx has chronic exertional dyspnea which remains unchanged.  This has been present for many years.    History is significant for acute STEMI 2024.  There is a significant family history of coronary disease and multiple family members, both male and female,  from early heart attacks.    Review of Systems   10  point ROS  was otherwise non pertinent or negative except as per HPI or as below.   Gait: Normal      Most recent or relevant cardiac/vascular testing:    Echo (limited) 2025  EF 55%, normal wall motion    Echo 2024  EF 40 to 45%  Wall motion abnormalities noted  Mild TR    Cardiac cath 2024    Ost LAD to Prox LAD lesion is 40% stenosed.    2nd Diag lesion is 100% stenosed.    Mid LAD lesion is 99% stenosed.    1st Mrg lesion is 50% stenosed.    Mid RCA lesion is 90% stenosed.     3v CAD  PCI mLAD (culprit) + PCI mRCA  Mildly elevated LVEDP    Nuclear stress 2025  Normal left ventricular systolic function.    No evidence for prior myocardial infarction.    Although I cannot exclude a very mild zone of ischemia by perfusion imaging this seems to be an unlikely true abnormality based on the location and the absence of symptoms.    Objective:     /84   Pulse 70   Ht 5' 10\" (1.778 m)   Wt 99.8 kg (220 lb)   BMI 31.57 kg/m²     PHYSICAL EXAM:    General:  Normal appearance, no acute distress  Eyes:  Anicteric.  Oral mucosa:  Moist.  Neck:  No JVD. Carotid upstrokes are brisk without bruits.  No masses.  Chest:  Clear to auscultation   Cardiac:  No palpable PMI.  Normal S1 and S2.  No murmur gallop or rub.  Abdomen:  Soft and nontender. No palpable organomegaly or aortic enlargement.  Extremities:  No peripheral edema.  Musculoskeletal:  Symmetric.   Vascular:  Pedal pulses are intact.  Neuro:  Grossly symmetric.  Psych:  Alert and " oriented x3.    No Known Allergies    Current Outpatient Medications:     albuterol (PROVENTIL HFA,VENTOLIN HFA) 90 mcg/act inhaler, Inhale 2 puffs every 4 (four) hours as needed for wheezing, Disp: 6.7 g, Rfl: 0    aspirin (ECOTRIN LOW STRENGTH) 81 mg EC tablet, Take 1 tablet (81 mg total) by mouth daily, Disp: 30 tablet, Rfl: 5    atorvastatin (LIPITOR) 80 mg tablet, Take 1 tablet (80 mg total) by mouth daily with dinner, Disp: 30 tablet, Rfl: 5    clopidogrel (PLAVIX) 75 mg tablet, Take 1 tablet (75 mg total) by mouth daily Do not start before February 12, 2025., Disp: 30 tablet, Rfl: 5    lidocaine (LIDODERM) 5 %, Apply 1 patch topically over 12 hours daily Remove & Discard patch within 12 hours or as directed by MD (Patient taking differently: Apply 1 patch topically if needed (moderate pain)), Disp: 30 patch, Rfl: 0    losartan (COZAAR) 25 mg tablet, Take 1 tablet (25 mg total) by mouth daily, Disp: 90 tablet, Rfl: 0    metoprolol succinate (TOPROL-XL) 50 mg 24 hr tablet, Take 0.5 tablets (25 mg total) by mouth daily, Disp: 45 tablet, Rfl: 0    pantoprazole (PROTONIX) 40 mg tablet, Take 1 tablet (40 mg total) by mouth daily in the early morning, Disp: 30 tablet, Rfl: 5    clopidogrel (Plavix) 75 mg tablet, Take 8 tablets (600 mg total) by mouth once for 1 dose Do not start before February 11, 2025., Disp: 8 tablet, Rfl: 0    ticagrelor (BRILINTA) 90 MG, Take 1 tablet (90 mg total) by mouth every 12 (twelve) hours (Patient not taking: Reported on 4/25/2025), Disp: 60 tablet, Rfl: 5  Past Medical History:   Diagnosis Date    COPD (chronic obstructive pulmonary disease) (HCC)     Coronary artery disease     DVT of lower limb, acute (HCC)     Hyperlipidemia     Ischemic cardiomyopathy     MI (myocardial infarction) 2007    Myocardial infarction (HCC) 2010    STEMI (ST elevation myocardial infarction)     anterior    TIA (transient ischemic attack) 2022     Past Surgical History:   Procedure Laterality Date     "CARDIAC CATHETERIZATION N/A 2024    Procedure: Cardiac PCI;  Surgeon: Ranjeet Jc MD;  Location: [facility];  Service: Cardiology    CARDIAC CATHETERIZATION Left 2024    Procedure: Cardiac Left Heart Cath;  Surgeon: Ranjeet Jc MD;  Location: [facility];  Service: Cardiology    CORONARY STENT PLACEMENT  24    4 stents    KNEE SURGERY         CMP:   Lab Results   Component Value Date    K 3.7 2025    K 4.7 2023     2025     2023    CO2 26 2025    CO2 27 2023    BUN 16 2025    BUN 19 2023    CREATININE 1.09 2025    CREATININE 1.06 2023    EGFR 78 2025    EGFR 86 2023    EGFR 74 11/10/2020     Lipid Profile:    Lab Results   Component Value Date    TRIG 135 2024    HDL 43 2024         Social History     Tobacco Use   Smoking Status Former    Current packs/day: 0.00    Average packs/day: 0.5 packs/day for 25.0 years (12.5 ttl pk-yrs)    Types: Cigarettes    Start date:     Quit date: 2018    Years since quittin.3   Smokeless Tobacco Former    Types: Chew    Quit date: 1999   Tobacco Comments    \"here and there a cigerette\"               "

## 2025-04-25 NOTE — ASSESSMENT & PLAN NOTE
Anterior STEMI 12/14/2024 s/p PCI of mid LAD and mid RCA 12/14/2024  with reported prior MI 2007 with normal cath  Continue DAPT (aspirin and Plavix) through 12/14/2025  Metoprolol 25 mg daily, atorvastatin 80 mg daily

## 2025-04-30 ENCOUNTER — PREP FOR PROCEDURE (OUTPATIENT)
Dept: CARDIOLOGY CLINIC | Facility: CLINIC | Age: 50
End: 2025-04-30

## 2025-04-30 ENCOUNTER — TELEPHONE (OUTPATIENT)
Dept: CARDIOLOGY CLINIC | Facility: CLINIC | Age: 50
End: 2025-04-30

## 2025-04-30 DIAGNOSIS — R07.9 CHEST PAIN, UNSPECIFIED TYPE: Primary | ICD-10-CM

## 2025-04-30 DIAGNOSIS — I25.119 CORONARY ARTERY DISEASE INVOLVING NATIVE CORONARY ARTERY OF NATIVE HEART WITH ANGINA PECTORIS (HCC): ICD-10-CM

## 2025-04-30 DIAGNOSIS — I25.5 ISCHEMIC CARDIOMYOPATHY: Primary | ICD-10-CM

## 2025-04-30 NOTE — PROGRESS NOTES
Spoke to pt-took Imdur 2 days and awoke with very low BP the following mornings.  Also notes a headache and not much improvement in his chest discomfort.    Stop Imdur    Will arrange for cath for further eval given significant FH

## 2025-04-30 NOTE — TELEPHONE ENCOUNTER
Patient scheduled for LEFT Heart Cath on 5/19/25 at [facility] with Dr. TOMPKINS.      Instructions sent to patient through [employer].        Patient aware of all general instructions.    Medication holds:     NO MED HOLDS     Labs to be done NO LATER THAN 5/12/25  CMP / CBC (fasting 8 hours)

## 2025-05-02 NOTE — TELEPHONE ENCOUNTER
this pt is scheduled with you on 5/19 that is a coraflow day and you have 3, he will be your 4th case that day and he just just a Left Heart Cath.     Secure chat message with Dr. Jc & he gave me a thumbs up.

## 2025-05-12 ENCOUNTER — RESULTS FOLLOW-UP (OUTPATIENT)
Dept: CARDIOLOGY CLINIC | Facility: CLINIC | Age: 50
End: 2025-05-12

## 2025-05-12 ENCOUNTER — APPOINTMENT (OUTPATIENT)
Dept: LAB | Facility: HOSPITAL | Age: 50
End: 2025-05-12
Payer: COMMERCIAL

## 2025-05-12 DIAGNOSIS — I25.5 ISCHEMIC CARDIOMYOPATHY: ICD-10-CM

## 2025-05-12 LAB
ALBUMIN SERPL BCG-MCNC: 4.2 G/DL (ref 3.5–5)
ALP SERPL-CCNC: 111 U/L (ref 34–104)
ALT SERPL W P-5'-P-CCNC: 39 U/L (ref 7–52)
ANION GAP SERPL CALCULATED.3IONS-SCNC: 6 MMOL/L (ref 4–13)
AST SERPL W P-5'-P-CCNC: 20 U/L (ref 13–39)
BASOPHILS # BLD AUTO: 0.07 THOUSANDS/ÂΜL (ref 0–0.1)
BASOPHILS NFR BLD AUTO: 1 % (ref 0–1)
BILIRUB SERPL-MCNC: 0.5 MG/DL (ref 0.2–1)
BUN SERPL-MCNC: 16 MG/DL (ref 5–25)
CALCIUM SERPL-MCNC: 9 MG/DL (ref 8.4–10.2)
CHLORIDE SERPL-SCNC: 103 MMOL/L (ref 96–108)
CO2 SERPL-SCNC: 27 MMOL/L (ref 21–32)
CREAT SERPL-MCNC: 1.02 MG/DL (ref 0.6–1.3)
EOSINOPHIL # BLD AUTO: 0.34 THOUSAND/ÂΜL (ref 0–0.61)
EOSINOPHIL NFR BLD AUTO: 5 % (ref 0–6)
ERYTHROCYTE [DISTWIDTH] IN BLOOD BY AUTOMATED COUNT: 14.1 % (ref 11.6–15.1)
GFR SERPL CREATININE-BSD FRML MDRD: 84 ML/MIN/1.73SQ M
GLUCOSE P FAST SERPL-MCNC: 118 MG/DL (ref 65–99)
HCT VFR BLD AUTO: 43.1 % (ref 36.5–49.3)
HGB BLD-MCNC: 14.8 G/DL (ref 12–17)
IMM GRANULOCYTES # BLD AUTO: 0.01 THOUSAND/UL (ref 0–0.2)
IMM GRANULOCYTES NFR BLD AUTO: 0 % (ref 0–2)
LYMPHOCYTES # BLD AUTO: 2.78 THOUSANDS/ÂΜL (ref 0.6–4.47)
LYMPHOCYTES NFR BLD AUTO: 37 % (ref 14–44)
MCH RBC QN AUTO: 32.5 PG (ref 26.8–34.3)
MCHC RBC AUTO-ENTMCNC: 34.3 G/DL (ref 31.4–37.4)
MCV RBC AUTO: 95 FL (ref 82–98)
MONOCYTES # BLD AUTO: 0.74 THOUSAND/ÂΜL (ref 0.17–1.22)
MONOCYTES NFR BLD AUTO: 10 % (ref 4–12)
NEUTROPHILS # BLD AUTO: 3.66 THOUSANDS/ÂΜL (ref 1.85–7.62)
NEUTS SEG NFR BLD AUTO: 47 % (ref 43–75)
NRBC BLD AUTO-RTO: 0 /100 WBCS
PLATELET # BLD AUTO: 295 THOUSANDS/UL (ref 149–390)
PMV BLD AUTO: 10 FL (ref 8.9–12.7)
POTASSIUM SERPL-SCNC: 4 MMOL/L (ref 3.5–5.3)
PROT SERPL-MCNC: 6.7 G/DL (ref 6.4–8.4)
RBC # BLD AUTO: 4.55 MILLION/UL (ref 3.88–5.62)
SODIUM SERPL-SCNC: 136 MMOL/L (ref 135–147)
WBC # BLD AUTO: 7.6 THOUSAND/UL (ref 4.31–10.16)

## 2025-05-12 PROCEDURE — 80053 COMPREHEN METABOLIC PANEL: CPT

## 2025-05-12 PROCEDURE — 36415 COLL VENOUS BLD VENIPUNCTURE: CPT

## 2025-05-12 PROCEDURE — 85025 COMPLETE CBC W/AUTO DIFF WBC: CPT

## 2025-05-19 ENCOUNTER — HOSPITAL ENCOUNTER (OUTPATIENT)
Facility: HOSPITAL | Age: 50
Setting detail: OUTPATIENT SURGERY
Discharge: HOME | End: 2025-05-19
Attending: INTERNAL MEDICINE | Admitting: INTERNAL MEDICINE
Payer: COMMERCIAL

## 2025-05-19 VITALS
SYSTOLIC BLOOD PRESSURE: 118 MMHG | BODY MASS INDEX: 30.92 KG/M2 | HEIGHT: 70 IN | HEART RATE: 58 BPM | DIASTOLIC BLOOD PRESSURE: 75 MMHG | RESPIRATION RATE: 16 BRPM | WEIGHT: 216 LBS | OXYGEN SATURATION: 97 % | TEMPERATURE: 97.2 F

## 2025-05-19 DIAGNOSIS — R07.9 CHEST PAIN, UNSPECIFIED TYPE: ICD-10-CM

## 2025-05-19 DIAGNOSIS — I25.119 CORONARY ARTERY DISEASE INVOLVING NATIVE CORONARY ARTERY OF NATIVE HEART WITH ANGINA PECTORIS (HCC): ICD-10-CM

## 2025-05-19 LAB
ATRIAL RATE: 62 BPM
P AXIS: 18 DEGREES
PR INTERVAL: 152 MS
QRS AXIS: 15 DEGREES
QRSD INTERVAL: 138 MS
QT INTERVAL: 414 MS
QTC INTERVAL: 421 MS
T WAVE AXIS: 0 DEGREES
VENTRICULAR RATE: 62 BPM

## 2025-05-19 PROCEDURE — 99153 MOD SED SAME PHYS/QHP EA: CPT | Performed by: INTERNAL MEDICINE

## 2025-05-19 PROCEDURE — 93458 L HRT ARTERY/VENTRICLE ANGIO: CPT | Performed by: INTERNAL MEDICINE

## 2025-05-19 PROCEDURE — 93010 ELECTROCARDIOGRAM REPORT: CPT | Performed by: INTERNAL MEDICINE

## 2025-05-19 PROCEDURE — 99152 MOD SED SAME PHYS/QHP 5/>YRS: CPT | Performed by: INTERNAL MEDICINE

## 2025-05-19 PROCEDURE — 93005 ELECTROCARDIOGRAM TRACING: CPT

## 2025-05-19 PROCEDURE — C1887 CATHETER, GUIDING: HCPCS | Performed by: INTERNAL MEDICINE

## 2025-05-19 PROCEDURE — C1769 GUIDE WIRE: HCPCS | Performed by: INTERNAL MEDICINE

## 2025-05-19 PROCEDURE — C1894 INTRO/SHEATH, NON-LASER: HCPCS | Performed by: INTERNAL MEDICINE

## 2025-05-19 RX ORDER — NITROGLYCERIN 20 MG/100ML
INJECTION INTRAVENOUS CODE/TRAUMA/SEDATION MEDICATION
Status: DISCONTINUED | OUTPATIENT
Start: 2025-05-19 | End: 2025-05-19 | Stop reason: HOSPADM

## 2025-05-19 RX ORDER — LIDOCAINE HYDROCHLORIDE 10 MG/ML
INJECTION, SOLUTION EPIDURAL; INFILTRATION; INTRACAUDAL; PERINEURAL CODE/TRAUMA/SEDATION MEDICATION
Status: DISCONTINUED | OUTPATIENT
Start: 2025-05-19 | End: 2025-05-19 | Stop reason: HOSPADM

## 2025-05-19 RX ORDER — VERAPAMIL HYDROCHLORIDE 2.5 MG/ML
INJECTION INTRAVENOUS CODE/TRAUMA/SEDATION MEDICATION
Status: DISCONTINUED | OUTPATIENT
Start: 2025-05-19 | End: 2025-05-19 | Stop reason: HOSPADM

## 2025-05-19 RX ORDER — FENTANYL CITRATE 50 UG/ML
INJECTION, SOLUTION INTRAMUSCULAR; INTRAVENOUS CODE/TRAUMA/SEDATION MEDICATION
Status: DISCONTINUED | OUTPATIENT
Start: 2025-05-19 | End: 2025-05-19 | Stop reason: HOSPADM

## 2025-05-19 RX ORDER — SODIUM CHLORIDE 9 MG/ML
125 INJECTION, SOLUTION INTRAVENOUS CONTINUOUS
Status: DISCONTINUED | OUTPATIENT
Start: 2025-05-19 | End: 2025-05-19 | Stop reason: HOSPADM

## 2025-05-19 RX ORDER — ASPIRIN 81 MG/1
324 TABLET, CHEWABLE ORAL ONCE
Status: COMPLETED | OUTPATIENT
Start: 2025-05-19 | End: 2025-05-19

## 2025-05-19 RX ORDER — MIDAZOLAM HYDROCHLORIDE 2 MG/2ML
INJECTION, SOLUTION INTRAMUSCULAR; INTRAVENOUS CODE/TRAUMA/SEDATION MEDICATION
Status: DISCONTINUED | OUTPATIENT
Start: 2025-05-19 | End: 2025-05-19 | Stop reason: HOSPADM

## 2025-05-19 RX ORDER — HEPARIN SODIUM 1000 [USP'U]/ML
INJECTION, SOLUTION INTRAVENOUS; SUBCUTANEOUS CODE/TRAUMA/SEDATION MEDICATION
Status: DISCONTINUED | OUTPATIENT
Start: 2025-05-19 | End: 2025-05-19 | Stop reason: HOSPADM

## 2025-05-19 RX ADMIN — ASPIRIN 81 MG CHEWABLE TABLET 324 MG: 81 TABLET CHEWABLE at 06:49

## 2025-05-19 RX ADMIN — SODIUM CHLORIDE 125 ML/HR: 0.9 INJECTION, SOLUTION INTRAVENOUS at 06:50

## 2025-05-19 NOTE — DISCHARGE INSTR - AVS FIRST PAGE
1. Please see the post cardiac catheterization dishcarge instructions.   No heavy lifting, greater than 10 lbs. or strenuous  activity for 48 hrs.    2.Remove band aid tomorrow.  Shower and wash area- wrist gently with soap and water- beginning tomorrow. Rinse and pat dry.  Apply new water seal band aid.  Repeat this process for 5 days. No powders, creams lotions or antibiotic ointments  for 5 days.  No tub baths, hot tubs or swimming for 5 days.     3. Please call our office ([phone]) if you have any fever, redness, swelling, discharge from your wrist access site.    4.No driving for 1 day

## 2025-05-19 NOTE — Clinical Note
[facility] CARDIAC [facility]ATH [facility]  [address] [address]  Dept: [phone]    May 19, 2025     Patient: Benjamin W Kocher   YOB: 1975   Date of Visit: 5/19/2025       To Whom it May Concern:    Benjamin Kocher is under my professional care. He was seen in the hospital from 5/19/2025 to 05/19/25. He {Return to school/sport/work:39473}.    If you have any questions or concerns, please don't hesitate to call.         Sincerely,          JOSUE Ward

## 2025-07-09 DIAGNOSIS — I21.3 STEMI (ST ELEVATION MYOCARDIAL INFARCTION) (HCC): ICD-10-CM

## 2025-07-09 DIAGNOSIS — I25.10 CORONARY ARTERY DISEASE INVOLVING NATIVE CORONARY ARTERY OF NATIVE HEART WITHOUT ANGINA PECTORIS: ICD-10-CM

## 2025-07-09 DIAGNOSIS — R06.09 EXERTIONAL DYSPNEA: ICD-10-CM

## 2025-07-10 RX ORDER — ASPIRIN 81 MG/1
81 TABLET ORAL DAILY
Qty: 30 TABLET | Refills: 5 | Status: SHIPPED | OUTPATIENT
Start: 2025-07-10

## 2025-07-10 RX ORDER — PANTOPRAZOLE SODIUM 40 MG/1
40 TABLET, DELAYED RELEASE ORAL
Qty: 30 TABLET | Refills: 5 | Status: SHIPPED | OUTPATIENT
Start: 2025-07-10

## 2025-07-10 RX ORDER — CLOPIDOGREL BISULFATE 75 MG/1
75 TABLET ORAL DAILY
Qty: 30 TABLET | Refills: 5 | Status: SHIPPED | OUTPATIENT
Start: 2025-07-10

## (undated) DEVICE — BALLOON EUPHORA RX 2.5 X 15MM

## (undated) DEVICE — CATH DIAG 5FR IMPULSE 110CM PIG

## (undated) DEVICE — RUNTHROUGH NS EXTRA FLOPPY PTCA GUIDEWIRE: Brand: RUNTHROUGH

## (undated) DEVICE — CATH GUIDE LAUNCHER 6FR JR 4.0

## (undated) DEVICE — DGW .035 FC J3MM 260CM TEF: Brand: EMERALD

## (undated) DEVICE — CATH GUIDE LAUNCHER 6FR EBU 3.5

## (undated) DEVICE — TR BAND RADIAL ARTERY COMPRESSION DEVICE: Brand: TR BAND

## (undated) DEVICE — RADIFOCUS OPTITORQUE ANGIOGRAPHIC CATHETER: Brand: OPTITORQUE

## (undated) DEVICE — GUIDEWIRE WHOLEY HI TORQUE INTERM MOD J .035 145CM

## (undated) DEVICE — GLIDESHEATH BASIC HYDROPHILIC COATED INTRODUCER SHEATH: Brand: GLIDESHEATH

## (undated) DEVICE — CATH DIAG 5FR .045 100CM FR4